# Patient Record
Sex: MALE | Race: WHITE | Employment: OTHER | URBAN - METROPOLITAN AREA
[De-identification: names, ages, dates, MRNs, and addresses within clinical notes are randomized per-mention and may not be internally consistent; named-entity substitution may affect disease eponyms.]

---

## 2017-01-03 ENCOUNTER — TELEPHONE (OUTPATIENT)
Dept: PALLIATIVE MEDICINE | Facility: CLINIC | Age: 72
End: 2017-01-03

## 2017-01-03 NOTE — TELEPHONE ENCOUNTER
Reminded patient that they need to be off steroids, antibiotics and blood thinners for 7 days prior to the procedure. Reminded patient that they also need a .    Huma Ng [facility] [place]

## 2017-01-04 ENCOUNTER — RADIOLOGY INJECTION OFFICE VISIT (OUTPATIENT)
Dept: PALLIATIVE MEDICINE | Facility: CLINIC | Age: 72
End: 2017-01-04
Payer: MEDICARE

## 2017-01-04 ENCOUNTER — RADIANT APPOINTMENT (OUTPATIENT)
Dept: GENERAL RADIOLOGY | Facility: CLINIC | Age: 72
End: 2017-01-04
Attending: ANESTHESIOLOGY
Payer: MEDICARE

## 2017-01-04 VITALS — OXYGEN SATURATION: 100 % | DIASTOLIC BLOOD PRESSURE: 86 MMHG | SYSTOLIC BLOOD PRESSURE: 146 MMHG | HEART RATE: 71 BPM

## 2017-01-04 DIAGNOSIS — M54.16 LUMBAR RADICULOPATHY: Primary | ICD-10-CM

## 2017-01-04 DIAGNOSIS — M54.16 LUMBAR RADICULAR PAIN: ICD-10-CM

## 2017-01-04 PROCEDURE — 64483 NJX AA&/STRD TFRM EPI L/S 1: CPT | Mod: RT | Performed by: ANESTHESIOLOGY

## 2017-01-04 ASSESSMENT — PAIN SCALES - GENERAL: PAINLEVEL: NO PAIN (0)

## 2017-01-04 NOTE — PATIENT INSTRUCTIONS
[facility]   Procedure Discharge Instructions    Today you saw:   Dr. Caitlin Roca    You had an:  Epidural steroid injection     Medications used:  Lidocaine  Dexamethasone  Omnipaque             Be cautious when walking. Numbness and/or weakness in the lower extremities may occur up to 6-8 hours after the procedure due to effect of the local anesthetic    Do not drive for 6 hours. The effect of the local anesthetic could slow your reflexes.     You may resume your regular activities after 24 hours    Avoid strenuous activity for the first 24 hours    You may shower, however avoid swimming, tub baths or hot tubs for 24 hours following your procedure    You may have a mild to moderate increase in pain for several days following the injection.    It may take up to 14 days for the steroid medication to start working although you may feel the effect as early as a few days after the procedure.       You may use ice packs for 10-15 minutes, 3 to 4 times a day at the injection site for comfort    You may use anti-inflammatory medications (such as Ibuprofen or Aleve or Advil) or Tylenol for pain control if necessary    If you have diabetes, check your blood sugar more frequently than usual as your blood sugar may be higher than normal for 10-14 days following a steroid injection. Contact your doctor who manages your diabetes if your blood sugar is higher than usual    If you experience any of the following, call the pain center nursing line during work hours at [phone] or the after hours provider line at [phone]:  -Fever over 100 degree F  -Swelling, bleeding, redness, drainage, warmth at the injection site  -Progressive weakness or numbness in your legs   -Loss of bowel or bladder function  -Unusual headache that is not relieved by Tylenol  -Unusual new onset of pain that is not improving      Phone #s:  Appointment line: [phone];  Nurse line: [phone]

## 2017-01-04 NOTE — PROGRESS NOTES
[facility] - Procedure Note    Date of Service: 1/4/2017    Procedure performed: Right L4-5 transforaminal epidural steroid injection with fluoroscopic guidance  Diagnosis: Lumbar spondylosis; Lumbar radiculitis/radiculopathy  : Caitlin Roca MD   Anesthesia: none    Indications: Paco Eckert is a 71 year old male who is seen at the request of Dolores Sweet APRN CNP for lumbar transforaminal epidural steroid injection. The patient describes right sided low back pain that radiates to his anterior thigh and groin. The patient has been exhibiting symptoms consistent with lumbar intraspinal inflammation and radiculopathy. Symptoms have been persistent, disabling, and intermittently severe. The patient reports minimal improvement with conservative treatment, including PT and medications.    Lumbar MRI: Lumbar MRI [facility] 11-    1.  Multilevel degenerative changes and congenitally small thecal sac.  2.  L2-3 Mild-moderate central stenosis  3.  L3-4 Moderate- severe central stenosis    4.  L4-5 Moderate-severe central stenosis  5.  L5-S1 small left posterolateral disc protrusion    Allergies:    No Known Allergies     Vitals:  /86 mmHg  Pulse 71  SpO2 100%    Review of Systems: The patient denies recent fever, chills, illness, use of antibiotics or anticoagulants. All other 10-point review of systems negative.     Procedure: The procedure and risks were explained, and informed written consent was obtained from the patient. Risks include but are not limited to: infection, bleeding, increased pain, and damage to soft tissue, nerve, muscle, and vasculature structures. After getting informed consent, patient was brought into the procedure suite and was placed in a prone position on the procedure table. A Pause for the Cause was performed. Patient was prepped and draped in sterile fashion.     After identifying the right L4-5 neuroforamen, the C-arm was rotated to  a right lateral oblique angle.  A total of 4.5 mL of Lidocaine 1% was used to anesthetize the skin and the needle track at a skin entry site coaxial with the fluoroscopy beam, and overriding the superior aspect of the neuroforamen.  A 22 gauge 5 inch spinal needle was advanced under intermittent fluoroscopy until it entered the foramen superiorly.    The position was then inspected from anteroposterior and lateral views, and the needle adjusted appropriately.  After negative aspiration, a total of 1 mL of Omnipaque-300 was injected using static and continuous fluoroscopy confirming appropriate position, with spread along the nerve root sheath and into the epidural space, with no intravascular or intrathecal uptake. 9 mL of Omnipaque-300 was wasted.    2 mL of 1% lidocaine with 20 mg of dexamethasone was injected.  The needle was removed. Hemostasis was achieved, the area was cleaned, and bandaids were placed when appropriate. Images were saved to PACS.    The patient tolerated the procedure well, and was taken to the recovery room, and there was no evidence of procedural complications. No new sensory or motor deficits were noted following the procedure. The patient was stable and able to ambulate on discharge home. Post-procedure instructions were provided.     Pre-procedure pain score: 2/10 in the back, 0/10 in the leg  Post-procedure pain score: 0/10 in the back, 0/10 in the leg    Assessment/Plan: Paco Eckert is a 71 year old male s/p Right L4-5 transforaminal epidural steroid injection today for lumbar spondylosis, radiculitis/radiculopathy.     1. Following today's procedure, the patient was advised to contact the [facility] for any of the following:   Fever, chills, or night sweats   New onset of pain, numbness, or weakness   Any questions/concerns regarding the procedure  If unable to contact the Pain Center, the patient was instructed to go to a local Emergency Room for any  complications.   2. The patient will receive a follow-up call in 1 week.  3. The patient should follow-up with Dolores Sweet CNP (Dr. Mcmahon office) in 2 weeks for post-procedure evaluation.    [facility]  1/4/2017

## 2017-01-04 NOTE — PROGRESS NOTES
Injection intake:    If this procedure is requiring IV sedation has patient been NPO for 6  Hours? NA    Is patient on coumadin, plavix or other prescribed blood thinner?   No    If patient is on coumadin was it held for 5 days?   NA    If patient is on plavix was it held for 7 days?    NA     Does patient take aspirin?  No    If this is for a cervical procedure and patient is on aspirin has it been held for 6 days?   NA    Any allergies to contrast dye, iodine, steroid and/or numbing medications?  NO    Is patient currently taking antibiotics or have an active infection?  NO    Does patient have a ? Yes       Is patient pregnant or breastfeeding?  Not Applicable    Are the vital signs normal?  No: 144/104      Huma Ng [facility] [place]

## 2017-01-04 NOTE — MR AVS SNAPSHOT
After Visit Summary   1/4/2017    Paco Eckert    MRN: [id number]           Patient Information     Date Of Birth          1945        Visit Information        Provider Department      1/4/2017 10:00 AM Caitlin Roca MD [facility]        Today's Diagnoses     Lumbar radiculopathy    -  1       Care Instructions    [facility]   Procedure Discharge Instructions    Today you saw:   Dr. Caitlin Roca    You had an:  Epidural steroid injection     Medications used:  Lidocaine  Dexamethasone  Omnipaque             Be cautious when walking. Numbness and/or weakness in the lower extremities may occur up to 6-8 hours after the procedure due to effect of the local anesthetic    Do not drive for 6 hours. The effect of the local anesthetic could slow your reflexes.     You may resume your regular activities after 24 hours    Avoid strenuous activity for the first 24 hours    You may shower, however avoid swimming, tub baths or hot tubs for 24 hours following your procedure    You may have a mild to moderate increase in pain for several days following the injection.    It may take up to 14 days for the steroid medication to start working although you may feel the effect as early as a few days after the procedure.       You may use ice packs for 10-15 minutes, 3 to 4 times a day at the injection site for comfort    You may use anti-inflammatory medications (such as Ibuprofen or Aleve or Advil) or Tylenol for pain control if necessary    If you have diabetes, check your blood sugar more frequently than usual as your blood sugar may be higher than normal for 10-14 days following a steroid injection. Contact your doctor who manages your diabetes if your blood sugar is higher than usual    If you experience any of the following, call the pain center nursing line during work hours at [phone] or the after hours provider line at [phone]:  -Fever over 100 degree  F  -Swelling, bleeding, redness, drainage, warmth at the injection site  -Progressive weakness or numbness in your legs   -Loss of bowel or bladder function  -Unusual headache that is not relieved by Tylenol  -Unusual new onset of pain that is not improving      Phone #s:  Appointment line: [phone];  Nurse line: [phone]            Follow-ups after your visit        Who to contact     If you have questions or need follow up information about today's clinic visit or your schedule please contact [facility] directly at [phone].  Normal or non-critical lab and imaging results will be communicated to you by DotAlignhart, letter or phone within 4 business days after the clinic has received the results. If you do not hear from us within 7 days, please contact the clinic through Youxiduot or phone. If you have a critical or abnormal lab result, we will notify you by phone as soon as possible.  Submit refill requests through Wanova or call your pharmacy and they will forward the refill request to us. Please allow 3 business days for your refill to be completed.          Additional Information About Your Visit        MyChart Information     Wanova gives you secure access to your electronic health record. If you see a primary care provider, you can also send messages to your care team and make appointments. If you have questions, please call your primary care clinic.  If you do not have a primary care provider, please call [phone] and they will assist you.        Care EveryWhere ID     This is your Care EveryWhere ID. This could be used by other organizations to access your [facility] medical records  [id number]        Your Vitals Were     Pulse Pulse Oximetry                76 100%           Blood Pressure from Last 3 Encounters:   01/04/17 144/104   12/21/16 177/113   12/07/16 147/89    Weight from Last 3 Encounters:   12/07/16 90.266 kg (199 lb)   11/08/16 88.168 kg (194 lb 6 oz)   03/25/16  86.002 kg (189 lb 9.6 oz)              Today, you had the following     No orders found for display       Primary Care Provider Office Phone # Fax #    Emeka Hale [phone]       [facility] [address] [place] [address]        Thank you!     Thank you for choosing [facility]  for your care. Our goal is always to provide you with excellent care. Hearing back from our patients is one way we can continue to improve our services. Please take a few minutes to complete the written survey that you may receive in the mail after your visit with us. Thank you!             Your Updated Medication List - Protect others around you: Learn how to safely use, store and throw away your medicines at www.disposemymeds.org.          This list is accurate as of: 1/4/17 10:25 AM.  Always use your most recent med list.                   Brand Name Dispense Instructions for use    CLARITIN 10 MG tablet   Generic drug:  loratadine     30 tablet    Take 10 mg by mouth At Bedtime       clobetasol propionate 0.05 % Foam          ibuprofen 600 MG tablet    IBU    60 tablet    Take 1 tablet by mouth every 6 hours as needed for pain.       lisinopril 10 MG tablet    PRINIVIL/ZESTRIL    90 tablet    Take 1 tablet (10 mg) by mouth daily       montelukast 10 MG tablet    SINGULAIR    30 tablet    Take 1 tablet (10 mg) by mouth At Bedtime       simvastatin 40 MG tablet    ZOCOR    90 tablet    Take 1 tablet (40 mg) by mouth At Bedtime

## 2017-01-11 ENCOUNTER — TELEPHONE (OUTPATIENT)
Dept: PALLIATIVE MEDICINE | Facility: CLINIC | Age: 72
End: 2017-01-11

## 2017-01-11 NOTE — TELEPHONE ENCOUNTER
Patient had a lumbar epidural injection on 01/04/17.  Called patient for an update.      Pt reported the following details:  Patient states that he is feeling good pain relief from the injection.   Told patient that the information will be forwarded to her provider.  Also explained that, if a steroid medication was used, it could take up to 14 days to feel the full effect and if pt has any further questions or concerns pt should call the nurse line at [phone].    Emilie ALSTON)

## 2017-03-13 ENCOUNTER — TRANSFERRED RECORDS (OUTPATIENT)
Dept: HEALTH INFORMATION MANAGEMENT | Facility: CLINIC | Age: 72
End: 2017-03-13

## 2017-09-18 ENCOUNTER — TRANSFERRED RECORDS (OUTPATIENT)
Dept: HEALTH INFORMATION MANAGEMENT | Facility: CLINIC | Age: 72
End: 2017-09-18

## 2017-10-03 ENCOUNTER — OFFICE VISIT (OUTPATIENT)
Dept: PEDIATRICS | Facility: CLINIC | Age: 72
End: 2017-10-03
Payer: MEDICARE

## 2017-10-03 VITALS
HEIGHT: 66 IN | TEMPERATURE: 97.9 F | OXYGEN SATURATION: 98 % | BODY MASS INDEX: 30.86 KG/M2 | WEIGHT: 192 LBS | HEART RATE: 79 BPM | SYSTOLIC BLOOD PRESSURE: 108 MMHG | DIASTOLIC BLOOD PRESSURE: 70 MMHG

## 2017-10-03 DIAGNOSIS — S46.819A STRAIN OF TRAPEZIUS MUSCLE, UNSPECIFIED LATERALITY, INITIAL ENCOUNTER: Primary | ICD-10-CM

## 2017-10-03 DIAGNOSIS — I10 ESSENTIAL HYPERTENSION WITH GOAL BLOOD PRESSURE LESS THAN 140/90: ICD-10-CM

## 2017-10-03 DIAGNOSIS — B37.2 CANDIDAL DERMATITIS: ICD-10-CM

## 2017-10-03 DIAGNOSIS — E78.5 HYPERLIPIDEMIA LDL GOAL <130: ICD-10-CM

## 2017-10-03 LAB
CHOLEST SERPL-MCNC: 205 MG/DL
HDLC SERPL-MCNC: 29 MG/DL
LDLC SERPL CALC-MCNC: 136 MG/DL
NONHDLC SERPL-MCNC: 176 MG/DL
TRIGL SERPL-MCNC: 198 MG/DL

## 2017-10-03 PROCEDURE — 36415 COLL VENOUS BLD VENIPUNCTURE: CPT | Performed by: INTERNAL MEDICINE

## 2017-10-03 PROCEDURE — 99214 OFFICE O/P EST MOD 30 MIN: CPT | Performed by: INTERNAL MEDICINE

## 2017-10-03 PROCEDURE — 80061 LIPID PANEL: CPT | Performed by: INTERNAL MEDICINE

## 2017-10-03 RX ORDER — HYDROCODONE BITARTRATE AND ACETAMINOPHEN 5; 325 MG/1; MG/1
1-2 TABLET ORAL EVERY 4 HOURS PRN
Qty: 20 TABLET | Refills: 0 | Status: SHIPPED | OUTPATIENT
Start: 2017-10-03 | End: 2018-03-22

## 2017-10-03 RX ORDER — LISINOPRIL 10 MG/1
10 TABLET ORAL DAILY
Qty: 90 TABLET | Refills: 3 | Status: SHIPPED | OUTPATIENT
Start: 2017-10-03 | End: 2017-10-27

## 2017-10-03 RX ORDER — SIMVASTATIN 40 MG
40 TABLET ORAL AT BEDTIME
Qty: 90 TABLET | Refills: 3 | Status: SHIPPED | OUTPATIENT
Start: 2017-10-03 | End: 2018-10-26

## 2017-10-03 RX ORDER — CLOTRIMAZOLE 1 %
CREAM (GRAM) TOPICAL 2 TIMES DAILY
Qty: 15 G | Refills: 1 | Status: SHIPPED | OUTPATIENT
Start: 2017-10-03 | End: 2018-10-26

## 2017-10-03 RX ORDER — CYCLOBENZAPRINE HCL 10 MG
10 TABLET ORAL 3 TIMES DAILY PRN
Qty: 30 TABLET | Refills: 0 | Status: SHIPPED | OUTPATIENT
Start: 2017-10-03 | End: 2018-03-22

## 2017-10-03 ASSESSMENT — ANXIETY QUESTIONNAIRES
1. FEELING NERVOUS, ANXIOUS, OR ON EDGE: NOT AT ALL
3. WORRYING TOO MUCH ABOUT DIFFERENT THINGS: NOT AT ALL
2. NOT BEING ABLE TO STOP OR CONTROL WORRYING: NOT AT ALL
GAD7 TOTAL SCORE: 0
5. BEING SO RESTLESS THAT IT IS HARD TO SIT STILL: NOT AT ALL
IF YOU CHECKED OFF ANY PROBLEMS ON THIS QUESTIONNAIRE, HOW DIFFICULT HAVE THESE PROBLEMS MADE IT FOR YOU TO DO YOUR WORK, TAKE CARE OF THINGS AT HOME, OR GET ALONG WITH OTHER PEOPLE: NOT DIFFICULT AT ALL
6. BECOMING EASILY ANNOYED OR IRRITABLE: NOT AT ALL
7. FEELING AFRAID AS IF SOMETHING AWFUL MIGHT HAPPEN: NOT AT ALL

## 2017-10-03 ASSESSMENT — PATIENT HEALTH QUESTIONNAIRE - PHQ9
SUM OF ALL RESPONSES TO PHQ QUESTIONS 1-9: 0
5. POOR APPETITE OR OVEREATING: NOT AT ALL
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
## 2017-10-03 NOTE — PATIENT INSTRUCTIONS
"Lab work downstairs today.  Directions:  As you walk through the first floor, you'll see (on the right) first the pharmacy, then some bathrooms, then the \"Lab and Imaging\" area. Give them your name at the window there and wait for them to call you.     May begin flexeril (cyclobenzaprine) as needed for muscle spasm of neck.    May begin hydrocodone/acetaminophen up to 1-2 per 6 hours.    Call physical therapy or stop by desk today to set up physical therapy>                      Neck Strain             What is neck strain?   A strain is a tear of a muscle or tendon. Your neck is surrounded by small muscles that are close to the vertebrae, and larger muscles that make up the visible muscles of the neck.   How does it occur?   Neck strains most often happen when the head and neck are forcibly moved, such as in a whiplash injury or from contact in sports. Sometimes strains happen from an awkward position during sleep or poor posture while working at a computer.   What are the symptoms?   Symptoms include pain in your neck. When the neck muscles go into spasm you feel hard, tight muscles in your neck that are very tender to the touch. You have pain when you move your head to the side or when you try to move your head up or down. The spasming muscles can cause headaches.   The pain may start right after an injury or may take a few hours or days to develop. Other symptoms may include neck stiffness, dizziness, or unusual sensations, such as burning or a pins-and-needles feeling.   How is it diagnosed?   Your healthcare provider will examine your neck. You may have X-rays to make sure the vertebrae are not injured.   How is it treated?   Right after the injury, put an ice pack, gel pack, or package of frozen vegetables, wrapped in a cloth on the area every 3 to 4 hours, for up to 20 minutes at a time.   If you still have neck pain several days after the injury and after using ice, your healthcare provider may recommend " using moist heat on your neck. You can buy a moist-heat pad or make your own by soaking towels in hot water. Put moist heat on your neck for up to 20 minutes at a time every 3 or 4 hours until the pain goes away. You may find that it helps to alternate putting heat and ice on your neck.   Your healthcare provider may prescribe an anti-inflammatory medicine and a neck collar to support your neck and prevent further injury. Nonsteroidal anti-inflammatory medicines (NSAIDs) may cause stomach bleeding and other problems. These risks increase with age. Read the label and take as directed. Unless recommended by your healthcare provider, do not take for more than 10 days.   Follow your provider's instructions for doing exercises to help you recover.   How long will the effects last?   How long it takes to recover depends on your age, health, and if you have had a previous neck injury. Recovery time also depends on the severity of the injury. A mild injury may recover within a few weeks, whereas a severe injury may take 6 weeks or longer to recover. Ask your healthcare provider when you can return to your normal activities.   How can I prevent neck strain?   Neck strain is best prevented by having strong and supple neck muscles. If you have a job that requires you to be in one position all day (for example, work at a computer all day), it is very important to take breaks and stretch your neck muscles. Your provider will give you exercises to do while taking breaks from work.     Published by [employer].  This content is reviewed periodically and is subject to change as new health information becomes available. The information is intended to inform and educate and is not a replacement for medical evaluation, advice, diagnosis or treatment by a healthcare professional.   Written by Marcos Castillo MD, for [employer].   ? 2010 [employer] and/or its affiliates. All Rights Reserved.   Copyright   [employer]  2011  Adult Health Advisor                    Neck Strain Rehabilitation Exercises                Do these exercises only if you do not have pain or numbness running down your arm or into your hand. The first 6 exercises are meant to help your neck remain flexible. Do not do any exercises that make your neck pain worse.   Active neck rotation: Sit in a chair, keeping your neck, shoulders, and trunk straight. First, turn your head slowly to the right. Move it gently to the point of pain. Move it back to the forward position. Relax. Then move it to the left. Repeat 10 times.   Active neck side bend: Sit in a chair, keeping your neck, shoulders, and trunk straight. Tilt your head so that your right ear moves toward your right shoulder. Move it to the point of pain. Then tilt your head so your left ear moves toward your left shoulder. Make sure you do not rotate your head while tilting or raise your shoulder toward your head. Repeat this exercise 10 times in each direction.   Neck flexion: Sit in a chair, keeping your neck, shoulders, and trunk straight. Bend your head forward, reaching your chin toward your chest. Hold for 5 seconds. Repeat 10 times.   Neck extension: Sit in a chair, keeping your neck, shoulders, and trunk straight. Bring your head back so that your chin is pointing toward the ceiling. Repeat 10 times.   Chin tuck: Place your fingertips on your chin and gently push your head straight back as if you are trying to make a double chin. Keep looking forward as your head moves back. Hold 5 seconds and repeat 5 times.   Scalene stretch: Sit or stand and clasp both hands behind your back. Lower your left shoulder and tilt your head toward the right until you feel a stretch. Hold this position for 15 to 30 seconds and then come back to the starting position. Then lower your right shoulder and tilt your head toward the left. Hold for 15 to 30 seconds. Repeat 3 times on each side.   Isometric neck flexion: Sit  tall, eyes straight ahead, and chin level. Place your palm against your forehead and gently push your forehead into your palm. Hold for 5 seconds and release. Do 3 sets of 5.   Isometric neck extension: Sit tall, eyes straight ahead, and chin level. Clasp your hands together and place them behind your head. Press the back of your head into your palms. Hold 5 seconds and release. Do 3 sets of 5.   Isometric neck side bend: Sit tall, eyes straight ahead, and chin level. Place the palm of your hand at the side of your temple and press your temple into the palm of your hand. Hold 5 seconds and release. Do 3 sets of 5 on each side.   Head lift: Neck curl: Lie on your back with your knees bent and your feet flat on the floor. Tuck your chin and lift your head toward your chest, keeping your shoulders on the floor. Hold for 5 seconds. Repeat 10 times.   Head lift: Neck side bend: Lie on your right side with your right arm lying straight out. Rest your head on your arm, then lift your head slowly toward your left shoulder. Hold for 5 seconds. Repeat 10 times. Switch to your left side and repeat the exercise, lifting your head toward your right shoulder.   Neck extension on hands and knees: Get on your hands and knees and look down at the floor. Keep your back straight and let your head slowly drop toward your chest. Then tuck your chin slightly and lift your head up until your neck is level with your back. Hold this position for 5 seconds. Repeat 10 times.   Scapular squeeze: While sitting or standing with your arms by your sides, squeeze your shoulder blades together and hold for 5 seconds. Do 3 sets of 10.   Published by [employer].  This content is reviewed periodically and is subject to change as new health information becomes available. The information is intended to inform and educate and is not a replacement for medical evaluation, advice, diagnosis or treatment by a healthcare professional.   Written by Arlette  Simeon MS, PT, and Mrae Rivera, PT, [facility], hospitals, for [employer].   ? 2010 [facility] and/or its affiliates. All Rights Reserved.   Copyright   [employer] 2011  Adult Health Advisor

## 2017-10-03 NOTE — NURSING NOTE
"Chief Complaint   Patient presents with     Musculoskeletal Problem     Neck pain       Initial /70 (BP Location: Right arm, Cuff Size: Adult Regular)  Pulse 79  Temp 97.9  F (36.6  C) (Oral)  Ht 5' 6\" (1.676 m)  Wt 192 lb (87.1 kg)  SpO2 98%  BMI 30.99 kg/m2 Estimated body mass index is 30.99 kg/(m^2) as calculated from the following:    Height as of this encounter: 5' 6\" (1.676 m).    Weight as of this encounter: 192 lb (87.1 kg).  Medication Reconciliation: complete     Mima Laguna MA   October 3, 2017,  10:00 AM    "

## 2017-10-03 NOTE — MR AVS SNAPSHOT
"              After Visit Summary   10/3/2017    Paco Eckert    MRN: [id number]           Patient Information     Date Of Birth          1945        Visit Information        Provider Department      10/3/2017 10:00 AM Emeka Hale MD [facility]        Today's Diagnoses     Strain of trapezius muscle, unspecified laterality, initial encounter    -  1    Hyperlipidemia LDL goal <130        Essential hypertension with goal blood pressure less than 140/90          Care Instructions    Lab work downstairs today.  Directions:  As you walk through the first floor, you'll see (on the right) first the pharmacy, then some bathrooms, then the \"Lab and Imaging\" area. Give them your name at the window there and wait for them to call you.     May begin flexeril (cyclobenzaprine) as needed for muscle spasm of neck.    May begin hydrocodone/acetaminophen up to 1-2 per 6 hours.    Call physical therapy or stop by desk today to set up physical therapy>                      Neck Strain             What is neck strain?   A strain is a tear of a muscle or tendon. Your neck is surrounded by small muscles that are close to the vertebrae, and larger muscles that make up the visible muscles of the neck.   How does it occur?   Neck strains most often happen when the head and neck are forcibly moved, such as in a whiplash injury or from contact in sports. Sometimes strains happen from an awkward position during sleep or poor posture while working at a computer.   What are the symptoms?   Symptoms include pain in your neck. When the neck muscles go into spasm you feel hard, tight muscles in your neck that are very tender to the touch. You have pain when you move your head to the side or when you try to move your head up or down. The spasming muscles can cause headaches.   The pain may start right after an injury or may take a few hours or days to develop. Other symptoms may include neck stiffness, dizziness, or unusual " sensations, such as burning or a pins-and-needles feeling.   How is it diagnosed?   Your healthcare provider will examine your neck. You may have X-rays to make sure the vertebrae are not injured.   How is it treated?   Right after the injury, put an ice pack, gel pack, or package of frozen vegetables, wrapped in a cloth on the area every 3 to 4 hours, for up to 20 minutes at a time.   If you still have neck pain several days after the injury and after using ice, your healthcare provider may recommend using moist heat on your neck. You can buy a moist-heat pad or make your own by soaking towels in hot water. Put moist heat on your neck for up to 20 minutes at a time every 3 or 4 hours until the pain goes away. You may find that it helps to alternate putting heat and ice on your neck.   Your healthcare provider may prescribe an anti-inflammatory medicine and a neck collar to support your neck and prevent further injury. Nonsteroidal anti-inflammatory medicines (NSAIDs) may cause stomach bleeding and other problems. These risks increase with age. Read the label and take as directed. Unless recommended by your healthcare provider, do not take for more than 10 days.   Follow your provider's instructions for doing exercises to help you recover.   How long will the effects last?   How long it takes to recover depends on your age, health, and if you have had a previous neck injury. Recovery time also depends on the severity of the injury. A mild injury may recover within a few weeks, whereas a severe injury may take 6 weeks or longer to recover. Ask your healthcare provider when you can return to your normal activities.   How can I prevent neck strain?   Neck strain is best prevented by having strong and supple neck muscles. If you have a job that requires you to be in one position all day (for example, work at a computer all day), it is very important to take breaks and stretch your neck muscles. Your provider will give  you exercises to do while taking breaks from work.     Published by [employer].  This content is reviewed periodically and is subject to change as new health information becomes available. The information is intended to inform and educate and is not a replacement for medical evaluation, advice, diagnosis or treatment by a healthcare professional.   Written by Marcos Castillo MD, for [employer].   ? 2010 [facility] and/or its affiliates. All Rights Reserved.   Copyright   Clinical Reference Systems 2011  Adult Health Advisor                    Neck Strain Rehabilitation Exercises                Do these exercises only if you do not have pain or numbness running down your arm or into your hand. The first 6 exercises are meant to help your neck remain flexible. Do not do any exercises that make your neck pain worse.   Active neck rotation: Sit in a chair, keeping your neck, shoulders, and trunk straight. First, turn your head slowly to the right. Move it gently to the point of pain. Move it back to the forward position. Relax. Then move it to the left. Repeat 10 times.   Active neck side bend: Sit in a chair, keeping your neck, shoulders, and trunk straight. Tilt your head so that your right ear moves toward your right shoulder. Move it to the point of pain. Then tilt your head so your left ear moves toward your left shoulder. Make sure you do not rotate your head while tilting or raise your shoulder toward your head. Repeat this exercise 10 times in each direction.   Neck flexion: Sit in a chair, keeping your neck, shoulders, and trunk straight. Bend your head forward, reaching your chin toward your chest. Hold for 5 seconds. Repeat 10 times.   Neck extension: Sit in a chair, keeping your neck, shoulders, and trunk straight. Bring your head back so that your chin is pointing toward the ceiling. Repeat 10 times.   Chin tuck: Place your fingertips on your chin and gently push your head straight back as if you are  trying to make a double chin. Keep looking forward as your head moves back. Hold 5 seconds and repeat 5 times.   Scalene stretch: Sit or stand and clasp both hands behind your back. Lower your left shoulder and tilt your head toward the right until you feel a stretch. Hold this position for 15 to 30 seconds and then come back to the starting position. Then lower your right shoulder and tilt your head toward the left. Hold for 15 to 30 seconds. Repeat 3 times on each side.   Isometric neck flexion: Sit tall, eyes straight ahead, and chin level. Place your palm against your forehead and gently push your forehead into your palm. Hold for 5 seconds and release. Do 3 sets of 5.   Isometric neck extension: Sit tall, eyes straight ahead, and chin level. Clasp your hands together and place them behind your head. Press the back of your head into your palms. Hold 5 seconds and release. Do 3 sets of 5.   Isometric neck side bend: Sit tall, eyes straight ahead, and chin level. Place the palm of your hand at the side of your temple and press your temple into the palm of your hand. Hold 5 seconds and release. Do 3 sets of 5 on each side.   Head lift: Neck curl: Lie on your back with your knees bent and your feet flat on the floor. Tuck your chin and lift your head toward your chest, keeping your shoulders on the floor. Hold for 5 seconds. Repeat 10 times.   Head lift: Neck side bend: Lie on your right side with your right arm lying straight out. Rest your head on your arm, then lift your head slowly toward your left shoulder. Hold for 5 seconds. Repeat 10 times. Switch to your left side and repeat the exercise, lifting your head toward your right shoulder.   Neck extension on hands and knees: Get on your hands and knees and look down at the floor. Keep your back straight and let your head slowly drop toward your chest. Then tuck your chin slightly and lift your head up until your neck is level with your back. Hold this position  for 5 seconds. Repeat 10 times.   Scapular squeeze: While sitting or standing with your arms by your sides, squeeze your shoulder blades together and hold for 5 seconds. Do 3 sets of 10.   Published by [employer].  This content is reviewed periodically and is subject to change as new health information becomes available. The information is intended to inform and educate and is not a replacement for medical evaluation, advice, diagnosis or treatment by a healthcare professional.   Written by Arlette Hendrix, MS, PT, and Mare Rivera PT, [facility] [place], for [employer].   ? 2010 [facility] and/or its affiliates. All Rights Reserved.   Copyright   Clinical Reference Systems 2011  Adult Health Advisor                               Follow-ups after your visit        Additional Services     [facility] PT, HAND, AND CHIROPRACTIC REFERRAL       **This order will print in the [facility] Scheduling Office**    Physical Therapy, Hand Therapy and Chiropractic Care are available through:    *[facility]  *[facility]  *[facility]    Call one number to schedule at any of the above locations: [phone].    Your provider has referred you to: [facility] - PT and/or Chiropractic Care determined by clinical presentation at [facility] or [facility] Initial Visit    Indication/Reason for Referral: Neck Pain  Onset of Illness: 3 weeks.   Therapy Orders: Evaluate and Treat  Special Programs: None  Special Request: None    Filemon Colbert      Additional Comments for the Therapist or Chiropractor:     Please be aware that coverage of these services is subject to the terms and limitations of your health insurance plan.  Call member services at your health plan with any benefit or coverage questions.      Please bring the following to your appointment:    *Your personal calendar for scheduling future appointments  *Comfortable clothing                  Who to contact     If you have questions or need  "follow up information about today's clinic visit or your schedule please contact [facility] [place] directly at [phone].  Normal or non-critical lab and imaging results will be communicated to you by St[employer], letter or phone within 4 business days after the clinic has received the results. If you do not hear from us within 7 days, please contact the clinic through Storie[employer] or phone. If you have a critical or abnormal lab result, we will notify you by phone as soon as possible.  Submit refill requests through [employer] or call your pharmacy and they will forward the refill request to us. Please allow 3 business days for your refill to be completed.          Additional Information About Your Visit        [employer] Information     [employer] gives you secure access to your electronic health record. If you see a primary care provider, you can also send messages to your care team and make appointments. If you have questions, please call your primary care clinic.  If you do not have a primary care provider, please call [phone] and they will assist you.        Care EveryWhere ID     This is your Care EveryWhere ID. This could be used by other organizations to access your [place] medical records  [id number]        Your Vitals Were     Pulse Temperature Height Pulse Oximetry BMI (Body Mass Index)       79 97.9  F (36.6  C) (Oral) 5' 6\" (1.676 m) 98% 30.99 kg/m2        Blood Pressure from Last 3 Encounters:   10/03/17 108/70   01/04/17 146/86   12/21/16 (!) 177/113    Weight from Last 3 Encounters:   10/03/17 192 lb (87.1 kg)   12/07/16 199 lb (90.3 kg)   11/08/16 194 lb 6 oz (88.2 kg)              We Performed the Following     [facility] PT, HAND, AND CHIROPRACTIC REFERRAL     Lipid panel reflex to direct LDL          Today's Medication Changes          These changes are accurate as of: 10/3/17 10:22 AM.  If you have any questions, ask your nurse or doctor.               Start taking these medicines.        " Dose/Directions    cyclobenzaprine 10 MG tablet   Commonly known as:  FLEXERIL   Used for:  Strain of trapezius muscle, unspecified laterality, initial encounter   Started by:  Emeka Hale MD        Dose:  10 mg   Take 1 tablet (10 mg) by mouth 3 times daily as needed for muscle spasms   Quantity:  30 tablet   Refills:  0       HYDROcodone-acetaminophen 5-325 MG per tablet   Commonly known as:  NORCO   Used for:  Strain of trapezius muscle, unspecified laterality, initial encounter   Started by:  Emeka Hale MD        Dose:  1-2 tablet   Take 1-2 tablets by mouth every 4 hours as needed for moderate to severe pain maximum 4 tablet(s) per day   Quantity:  20 tablet   Refills:  0            Where to get your medicines      These medications were sent to [id number] - [place] - [address] AT [facility] [address]  [address], [place] [address]     Phone:  [phone]     cyclobenzaprine 10 MG tablet    lisinopril 10 MG tablet         Some of these will need a paper prescription and others can be bought over the counter.  Ask your nurse if you have questions.     Bring a paper prescription for each of these medications     HYDROcodone-acetaminophen 5-325 MG per tablet                Primary Care Provider Office Phone # Fax #    Emeka Hale [phone] [address] [place] [address]        Equal Access to Services     [facility]: Hadii aad ku hadasho Soomaali, waaxda luqadaha, qaybta kaalmada adeegyada, lissett gonzalez. So [facility] [phone].    ATENCIÓN: Si habla español, tiene a squires disposición servicios gratuitos de asistencia lingüística. Wili al [phone].    We comply with applicable federal civil rights laws and [place] laws. We do not discriminate on the basis of race, color, national origin, age, disability, sex, sexual orientation, or gender identity.            Thank you!     Thank you for  choosing [facility] [place]  for your care. Our goal is always to provide you with excellent care. Hearing back from our patients is one way we can continue to improve our services. Please take a few minutes to complete the written survey that you may receive in the mail after your visit with us. Thank you!             Your Updated Medication List - Protect others around you: Learn how to safely use, store and throw away your medicines at www.disposemymeds.org.          This list is accurate as of: 10/3/17 10:22 AM.  Always use your most recent med list.                   Brand Name Dispense Instructions for use Diagnosis    clobetasol propionate 0.05 % Foam           cyclobenzaprine 10 MG tablet    FLEXERIL    30 tablet    Take 1 tablet (10 mg) by mouth 3 times daily as needed for muscle spasms    Strain of trapezius muscle, unspecified laterality, initial encounter       HYDROcodone-acetaminophen 5-325 MG per tablet    NORCO    20 tablet    Take 1-2 tablets by mouth every 4 hours as needed for moderate to severe pain maximum 4 tablet(s) per day    Strain of trapezius muscle, unspecified laterality, initial encounter       ibuprofen 600 MG tablet    IBU    60 tablet    Take 1 tablet by mouth every 6 hours as needed for pain.    Left-sided chest wall pain       lisinopril 10 MG tablet    PRINIVIL/ZESTRIL    90 tablet    Take 1 tablet (10 mg) by mouth daily    Essential hypertension with goal blood pressure less than 140/90       loratadine-pseudoePHEDrine  MG per 24 hr tablet    CLARITIN-D 24-hour     Take 1 tablet by mouth daily        simvastatin 40 MG tablet    ZOCOR    90 tablet    Take 1 tablet (40 mg) by mouth At Bedtime    Hyperlipidemia LDL goal <130

## 2017-10-04 ENCOUNTER — THERAPY VISIT (OUTPATIENT)
Dept: PHYSICAL THERAPY | Facility: CLINIC | Age: 72
End: 2017-10-04

## 2017-10-04 DIAGNOSIS — Z53.9 ERRONEOUS ENCOUNTER--DISREGARD: Primary | ICD-10-CM

## 2017-10-04 PROBLEM — M54.2 CERVICAL PAIN: Status: ACTIVE | Noted: 2017-10-04

## 2017-10-04 ASSESSMENT — ANXIETY QUESTIONNAIRES: GAD7 TOTAL SCORE: 0

## 2017-10-04 NOTE — PROGRESS NOTES
Patient wanted to be seen by chiropractic and not physical therapy.  Patient made decision to hold on todays visit to see chiro first.

## 2017-10-04 NOTE — MR AVS SNAPSHOT
After Visit Summary   10/4/2017    Paco Eckert    MRN: [id number]           Patient Information     Date Of Birth          1945        Visit Information        Provider Department      10/4/2017 10:50 AM Umer Bellamy PT [facility] [place]        Today's Diagnoses     ERRONEOUS ENCOUNTER--DISREGARD    -  1       Follow-ups after your visit        Your next 10 appointments already scheduled     Oct 10, 2017 11:00 AM CDT   (Arrive by 10:45 AM)   [facility] Chiropractor with Cierra Sheets DC   [facility] [place] ([facility]  )    [address]  [address]  [place] [address] [phone]              Who to contact     If you have questions or need follow up information about today's clinic visit or your schedule please contact [facility] [place] directly at [phone].  Normal or non-critical lab and imaging results will be communicated to you by MyChart, letter or phone within 4 business days after the clinic has received the results. If you do not hear from us within 7 days, please contact the clinic through [employer] or phone. If you have a critical or abnormal lab result, we will notify you by phone as soon as possible.  Submit refill requests through [employer] or call your pharmacy and they will forward the refill request to us. Please allow 3 business days for your refill to be completed.          Additional Information About Your Visit        MyChart Information     [employer] gives you secure access to your electronic health record. If you see a primary care provider, you can also send messages to your care team and make appointments. If you have questions, please call your primary care clinic.  If you do not have a primary care provider, please call [phone] and they will assist you.        Care EveryWhere ID     This is your Care EveryWhere ID. This could be used by other organizations to access your [facility]  medical records  [id number]         Blood Pressure from Last 3 Encounters:   10/03/17 108/70   01/04/17 146/86   12/21/16 (!) 177/113    Weight from Last 3 Encounters:   10/03/17 87.1 kg (192 lb)   12/07/16 90.3 kg (199 lb)   11/08/16 88.2 kg (194 lb 6 oz)              We Performed the Following     No Charge LOS        Primary Care Provider Office Phone # Fax #    Emeka Hale [phone] [address] [place] [address]        Equal Access to Services     [facility]: Hadii aad ku hadasho Sowaylon, waaxda luqadaha, qaybta kaalmada adelucille, lissett michel . So [facility] [phone].    ATENCIÓN: Si habla español, tiene a squires disposición servicios gratuitos de asistencia lingüística. [facility] [phone].    We comply with applicable federal civil rights laws and [place] laws. We do not discriminate on the basis of race, color, national origin, age, disability, sex, sexual orientation, or gender identity.            Thank you!     Thank you for choosing [facility] [place]  for your care. Our goal is always to provide you with excellent care. Hearing back from our patients is one way we can continue to improve our services. Please take a few minutes to complete the written survey that you may receive in the mail after your visit with us. Thank you!             Your Updated Medication List - Protect others around you: Learn how to safely use, store and throw away your medicines at www.disposemymeds.org.          This list is accurate as of: 10/4/17 10:58 AM.  Always use your most recent med list.                   Brand Name Dispense Instructions for use Diagnosis    clobetasol propionate 0.05 % Foam           clotrimazole 1 % cream    LOTRIMIN    15 g    Apply topically 2 times daily    Candidal dermatitis       cyclobenzaprine 10 MG tablet    FLEXERIL    30 tablet    Take 1 tablet (10 mg) by mouth 3 times daily as needed for muscle  spasms    Strain of trapezius muscle, unspecified laterality, initial encounter       HYDROcodone-acetaminophen 5-325 MG per tablet    NORCO    20 tablet    Take 1-2 tablets by mouth every 4 hours as needed for moderate to severe pain maximum 4 tablet(s) per day    Strain of trapezius muscle, unspecified laterality, initial encounter       ibuprofen 600 MG tablet    IBU    60 tablet    Take 1 tablet by mouth every 6 hours as needed for pain.    Left-sided chest wall pain       lisinopril 10 MG tablet    PRINIVIL/ZESTRIL    90 tablet    Take 1 tablet (10 mg) by mouth daily    Essential hypertension with goal blood pressure less than 140/90       loratadine-pseudoePHEDrine  MG per 24 hr tablet    CLARITIN-D 24-hour     Take 1 tablet by mouth daily        simvastatin 40 MG tablet    ZOCOR    90 tablet    Take 1 tablet (40 mg) by mouth At Bedtime    Hyperlipidemia LDL goal <130

## 2017-10-10 ENCOUNTER — THERAPY VISIT (OUTPATIENT)
Dept: CHIROPRACTIC MEDICINE | Facility: CLINIC | Age: 72
End: 2017-10-10
Payer: MEDICARE

## 2017-10-10 DIAGNOSIS — G89.29 CHRONIC RIGHT-SIDED LOW BACK PAIN WITH RIGHT-SIDED SCIATICA: ICD-10-CM

## 2017-10-10 DIAGNOSIS — M99.03 SOMATIC DYSFUNCTION OF LUMBAR REGION: Primary | ICD-10-CM

## 2017-10-10 DIAGNOSIS — M99.02 THORACIC SEGMENT DYSFUNCTION: ICD-10-CM

## 2017-10-10 DIAGNOSIS — M54.2 CERVICALGIA: ICD-10-CM

## 2017-10-10 DIAGNOSIS — M54.41 CHRONIC RIGHT-SIDED LOW BACK PAIN WITH RIGHT-SIDED SCIATICA: ICD-10-CM

## 2017-10-10 PROCEDURE — 98941 CHIROPRACT MANJ 3-4 REGIONS: CPT | Mod: AT | Performed by: CHIROPRACTOR

## 2017-10-10 PROCEDURE — 99203 OFFICE O/P NEW LOW 30 MIN: CPT | Mod: GA | Performed by: CHIROPRACTOR

## 2017-10-10 NOTE — PROGRESS NOTES
Initial Chiropractic Clinic Visit    PCP: Emeka Hale Babar is a 72 year old male who is seen  in consultation at the request of  Emeka Hale M.D. presenting with chronic lower back, right leg, and bilateral lower neck and upper back pain. Patient reports that the onset was 5+ years ago for unknown reasons. Patient reports over the last six month the lower back and leg pain has been getting worse. Pain is most noticeable when walking. States sitting improves the pain.  When asked, patient denies:, falling, slipping, bending and reaching or sleeping awkwardly. Patient reports he has tried physical therapy without much relief.  Prior to onset, the patient was able to walk 30 minutes without pain. Patient notes that due to symptoms, they can only walk 5 minutes.. Paco Eckert notes   standing rated at a 4/10 painful and prior to this incident it was 0/10.    MRI done one year ago of the lumbar spine showed, moderate to severe central stenosis L3-L4. Mild to moderate foraminal stenosis L4-L5, L5-S1.    Injury: No history of trauma or injury.    Location of Pain: lower lumbar spine into the right leg, upper thoracic, lower to upper cervical spine at the following level(s) C3 , C6 , T3 , T5 , T10, L4 , L5  and Sacrum   Duration of Pain: 5+ years   Rating of Pain at worst: 6/10  Rating of Pain Currently: 3-4/10  Symptoms are better with: Rest and sitting  Symptoms are worse with: standing and walking  Additional Features: Occasional numbness into right lateral leg. Pain to right groin and right lateral leg.     Health History  as reported by the patient:    How does the patient rate their own health:   Good    Current or past medical history:   Cancer-prostate and Depression    Medical allergies  None    Past Traumas/Surgeries  Cancer:  prostate and Other:  rectal    Family History  This patient has no significant family history    Medications:  Anti-depressants, Anti-inflammatory and Muscle  "relaxants    Occupation:  Retired    Primary job tasks:   Prolonged sitting    Barriers as home/work:   none    Additional health Issues:     NA      Review of Systems  Musculoskeletal: as above  Remainder of review of systems is negative including constitutional, CV, pulmonary, GI, Skin and Neurologic except as noted in HPI or medical history.    Past Medical History:   Diagnosis Date     Fatty liver 2/9/2011     Gouty arthropathy      Hypercholesteremia 12/15/2009     Hypertension      Impaired fasting glucose 7/26/2010     Leukopenia 11/7/2008    WBC 3.6 in 11/08.     Malignant neoplasm of prostate (H) 2000    on casodex and Lupron     Osteoporosis, unspecified     assoc with Lupron     Other psoriasis      Past Surgical History:   Procedure Laterality Date     APPLY WOUND VAC N/A 10/21/2014    Procedure: APPLY WOUND VAC;  Surgeon: Mir Hernandez MD;  Location:  OR     COLONOSCOPY N/A 10/10/2014    Procedure: COMBINED COLONOSCOPY, SINGLE BIOPSY/POLYPECTOMY BY BIOPSY;  Surgeon: Mir Hernandez MD;  Location:  GI     COLOSTOMY N/A 10/21/2014    Procedure: COLOSTOMY;  Surgeon: Mir Hernandez MD;  Location:  OR     ORTHOPEDIC SURGERY Bilateral 2000 ?    rotater repair both     ORTHOPEDIC SURGERY Right     ankle      RESECTION ABDOMINAL PERINEAL N/A 10/21/2014    Procedure: RESECTION ABDOMINAL PERINEAL;  Surgeon: Mir Hernandez MD;  Location:  OR     Objective  There were no vitals taken for this visit.      GENERAL APPEARANCE: healthy, alert and no distress   GAIT: NORMAL  SKIN: no suspicious lesions or rashes  NEURO: Normal strength and tone, mentation intact and speech normal  PSYCH:  mentation appears normal and affect normal/bright    Low back exam:    Inspection:  \"     no visible deformity in the low back       normal skin\",    ROM:       limited flexion due to pain       limited extension due to pain    Tender:       paraspinal muscles    Non Tender:       remainder of lumbar " spine    Strength:       ankle dorsiflexion 5/5       ankle plantarflexion 5/5       dorsiflexion of the great toe 5/5    Reflexes:       patellar (L3, L4) symmetric normal       achilles tendons (S1) symmetric normal    Sensation:      grossly intact throughout lower extremities    Special tests:  Kemps - Right negative and Left negative, SLR - Right negative and Left negative, Slump - Right negative and Left negative and Fabere - Right positive, mild back/hip and Left positive, mild back/hip    Segmental spinal dysfunction/restrictions found at::  C6 Left rotation restricted  T3 Right rotation restricted  T10 Right rotation restricted  L3 Right rotation restricted  L5 Right rotation restricted  PSIS Right Flexion restriction.    The following soft tissue hypotonicities were observed:Quad lumb: bilateral, referred pain: no    Trigger points were found in:Traps    Muscle spasm found in:Lumbar erector spine, Quad lumb, T-spine paraspinal and Traps      Radiology:  None today    Assessment:    No diagnosis found.    RX ordered/plan of care  Anticipated outcomes  Possible risks and side effects    After discussing the risk and benefits of care, patient consented to treatment    Prognosis: fair-due to DDD, stenosis      Patient's condition:  Patient had restrictions pre-manipulation    Treatment effectiveness:  Post manipulation there is better intersegmental movement and Patient claims to feel looser post manipulation      Plan:    Procedures:  Evaluation and Management:  78315 Moderate level exam 30 min    CMT:  14905 Chiropractic manipulative treatment 3-4 regions performed   Cervical: Diversified, C3 , C6, Supine -gentle, no cavitation  Thoracic: Diversified, T3, T5, T10, Prone-gentle no cavitation  Lumbar: Drop Table, L5, Prone. Gentle distraction L5-SIJ-x5    Modalities:  U/S to L/S spine for 4-5 min to assist the manipulation    Therapeutic procedures:  None    Response to Treatment  Reduction in symptoms as  reported by patient      Treatment plan and goals:  Goals:  STANDING: the patient specific goal is to attain the pre-injury status of 1/2 hours comfortably   Able to walk 20 minutes without back or leg pain.    Frequency of care  Duration of care is estimated to be 8 weeks, from the initial treatment.  It is estimated that the patient will need a total of 4-6 visits to resolve this episode.  For the initial therapeutic trial of care, the frequency is recommended at 1 X week or every other week, once daily.  A reevaluation would be clinically appropriate in 6 visits, to determine progress and further course of care.    In-Office Treatment  Evaluation  25754 Chiropractic manipulative treatment 3-4 regions performed   Cervical: Diversified, C3 , C6, Supine -gentle, no cavitation  Thoracic: Diversified, T3, T5, T10, Prone-gentle no cavitation  Lumbar: Drop Table, L5, Prone. Gentle distraction L5-SIJ-x5    Modalities:  U/S to L/S spine for 4-5 min to assist the manipulation      Recommendations:    Instructions:ice 20 minutes every other hour as needed    Follow-up:  Return to care in one week.       Discussed the assessment with the patient.      Disclaimer: This note consists of symbols derived from keyboarding, dictation and/or voice recognition software. As a result, there may be errors in the script that have gone undetected. Please consider this when interpreting information found in this chart.

## 2017-10-20 ENCOUNTER — THERAPY VISIT (OUTPATIENT)
Dept: CHIROPRACTIC MEDICINE | Facility: CLINIC | Age: 72
End: 2017-10-20
Payer: MEDICARE

## 2017-10-20 DIAGNOSIS — M99.02 THORACIC SEGMENT DYSFUNCTION: ICD-10-CM

## 2017-10-20 DIAGNOSIS — M54.2 CERVICALGIA: ICD-10-CM

## 2017-10-20 DIAGNOSIS — M99.01 CERVICAL SEGMENT DYSFUNCTION: Primary | ICD-10-CM

## 2017-10-20 DIAGNOSIS — M54.50 CHRONIC BILATERAL LOW BACK PAIN WITHOUT SCIATICA: ICD-10-CM

## 2017-10-20 DIAGNOSIS — G89.29 CHRONIC BILATERAL LOW BACK PAIN WITHOUT SCIATICA: ICD-10-CM

## 2017-10-20 PROCEDURE — 98941 CHIROPRACT MANJ 3-4 REGIONS: CPT | Mod: AT | Performed by: CHIROPRACTOR

## 2017-10-20 NOTE — PROGRESS NOTES
Visit #:  2 of 6 based on treatment plan 6-8 visits    Subjective:  Paco Eckert is a 72 year old male who is seen in f/u up for: neck and back pain.     Data Unavailable.     Since last visit on 10/10/2017,  Paco Eckert reports the following changes: Pain immediately after last treatment: 4/10 and their pain level today 4/10. Driving rated at a 4/10 painful and prior to initial visit 4/10 and prior to this incident it was 5/10.     Area of chief complaint:  Cervical, Thoracic and Lumbar :  Symptoms are graded at 3-4/10. The quality is described as stiff, achey, dull.  Motion has increased, but is still not normal, Lower cervical, mid-thoracic, and lower lumbar spine. Patient feels that they are slightly improved due to a slight reduction in symptoms.     Since last visit the patient feels that they are 10 percent  improved from last visit.       Objective:  The following was observed:    P: pain elicited on palpation, C6, T3, L5    A: static palpation demonstrates intersegmental asymmetry , C2, C6, T5, T7, L5, SIJ    R: motion palpation notes restricted motion-C6, T5, L5    T: muscle spasm at level(s): Lumbar erector spine, Sub-occipital, T-spine paraspinal and Traps Bilaterally      Assessment:    Segmental spinal dysfunction/restrictions found at:  C4   C6   T5  T7  L5  Sacrum    Diagnoses:    No diagnosis found.    Patient's condition:  Patient had restrictions pre-manipulation    Treatment effectiveness:  Post manipulation there is better intersegmental movement and Patient claims to feel looser post manipulation      Procedures:  CMT:  29326 Chiropractic manipulative treatment 3-4 regions performed   Cervical: Diversified, C3 , C6, Supine -gentle, no cavitation  Thoracic: Diversified, T3, T5, T10, Prone-gentle no cavitation  Lumbar: Drop Table, L5, Prone. Gentle distraction L5-SIJ-x5    Modalities:  U/S to L/S spine for 4-5 min to assist the manipulation    Therapeutic  procedures:  None      Prognosis: fair    Progress towards Goals: Patient is making slight progress towards the goal of STANDING: the patient specific goal is to attain the pre-injury status of 1/2 hours comfortably   Able to walk 20 minutes without back or leg pain.  .     Response to Treatment:   Patient tolerated the treatment well today.      Recommendations:    Instructions:ice 20 minutes every other hour as needed    Follow-up:  Return to care in 1-2 weeks.

## 2017-10-27 ENCOUNTER — OFFICE VISIT (OUTPATIENT)
Dept: PEDIATRICS | Facility: CLINIC | Age: 72
End: 2017-10-27
Payer: MEDICARE

## 2017-10-27 VITALS
HEIGHT: 66 IN | DIASTOLIC BLOOD PRESSURE: 60 MMHG | BODY MASS INDEX: 30.67 KG/M2 | TEMPERATURE: 99 F | SYSTOLIC BLOOD PRESSURE: 92 MMHG | WEIGHT: 190.8 LBS | OXYGEN SATURATION: 99 % | HEART RATE: 88 BPM

## 2017-10-27 DIAGNOSIS — M54.2 CERVICALGIA: Primary | ICD-10-CM

## 2017-10-27 DIAGNOSIS — I10 HYPERTENSION GOAL BP (BLOOD PRESSURE) < 140/90: ICD-10-CM

## 2017-10-27 DIAGNOSIS — R35.0 URINARY FREQUENCY: ICD-10-CM

## 2017-10-27 DIAGNOSIS — Z23 NEED FOR PROPHYLACTIC VACCINATION AND INOCULATION AGAINST INFLUENZA: ICD-10-CM

## 2017-10-27 PROCEDURE — 99214 OFFICE O/P EST MOD 30 MIN: CPT | Mod: 25 | Performed by: INTERNAL MEDICINE

## 2017-10-27 PROCEDURE — G0008 ADMIN INFLUENZA VIRUS VAC: HCPCS | Performed by: INTERNAL MEDICINE

## 2017-10-27 PROCEDURE — 90662 IIV NO PRSV INCREASED AG IM: CPT | Performed by: INTERNAL MEDICINE

## 2017-10-27 RX ORDER — TERAZOSIN 1 MG/1
CAPSULE ORAL
Qty: 70 CAPSULE | Refills: 0 | Status: SHIPPED | OUTPATIENT
Start: 2017-10-27 | End: 2018-03-22

## 2017-10-27 NOTE — PROGRESS NOTES
Injectable Influenza Immunization Documentation    1.  Is the person to be vaccinated sick today?   No    2. Does the person to be vaccinated have an allergy to a component   of the vaccine?   No  Egg Allergy Algorithm Link    3. Has the person to be vaccinated ever had a serious reaction   to influenza vaccine in the past?   No    4. Has the person to be vaccinated ever had Guillain-Barré syndrome?   No    Form completed by Yojana Cerna LPN        SUBJECTIVE:   Paco Eckert is a 72 year old male who presents to clinic today for the following health issues:      Patient here to follow up on back pain and neck pain:    Neck pain has been going on about 6 weeks about 1.5 months; improving, but will still flare up occaionally.  SOmetimes with pain in right forearm.  Improved with physical therapy and .   Was seen by chiropracter, and was given hydrocodone/acetaminophen; took this only a few times per day, and is done now.      low back pain:  Had an epidural steroid injection in January for his lumbar symptoms; thinks might want this again. (Originally scheduled by his pain and palliative clinic). On no current pain meds.     Also, he has additional complaints of frequent nighttime urination.  Tried flomax (tamsulosin) about 5 years ago; did not help. Of note, has history of prostate cancer, s/p lupron and xrt and seeded radiation.Does not bother him during the day.  Nighttime symptoms are the worst.     Problem list and histories reviewed & adjusted, as indicated.  Additional history: as documented    Patient Active Problem List   Diagnosis     Malignant neoplasm of prostate (H)     Gouty arthropathy     Other psoriasis     Osteoporosis     Leukopenia     Frequency of urination and polyuria     HYPERLIPIDEMIA LDL GOAL <130     Impaired fasting glucose     Fatty liver     Hypertension goal BP (blood pressure) < 140/90     Rectal cancer (H)     Cervical pain     Past Surgical History:   Procedure  Laterality Date     APPLY WOUND VAC N/A 10/21/2014    Procedure: APPLY WOUND VAC;  Surgeon: Mir Hernandez MD;  Location:  OR     COLONOSCOPY N/A 10/10/2014    Procedure: COMBINED COLONOSCOPY, SINGLE BIOPSY/POLYPECTOMY BY BIOPSY;  Surgeon: Mir Hernandez MD;  Location:  GI     COLOSTOMY N/A 10/21/2014    Procedure: COLOSTOMY;  Surgeon: Mir Hernandez MD;  Location:  OR     ORTHOPEDIC SURGERY Bilateral 2000 ?    rotater repair both     ORTHOPEDIC SURGERY Right     ankle      RESECTION ABDOMINAL PERINEAL N/A 10/21/2014    Procedure: RESECTION ABDOMINAL PERINEAL;  Surgeon: Mir Hernandez MD;  Location:  OR       Social History   Substance Use Topics     Smoking status: Never Smoker     Smokeless tobacco: Never Used     Alcohol use 0.0 oz/week     0 Standard drinks or equivalent per week      Comment: couple drinks/day     Family History   Problem Relation Age of Onset     DIABETES Brother      DIABETES Sister      Hypertension Sister      Hypertension Brother          Current Outpatient Prescriptions   Medication Sig Dispense Refill     terazosin (HYTRIN) 1 MG capsule Start with 1 tab (1 mg) daily for 1 week, 2 tabs (2 mg) daily for 1 week, 3 tabs (3 mg) daily for 1 week, then 4 tabs (4 mg) daily 70 capsule 0     loratadine-pseudoePHEDrine (CLARITIN-D 24-HOUR)  MG per 24 hr tablet Take 1 tablet by mouth daily       HYDROcodone-acetaminophen (NORCO) 5-325 MG per tablet Take 1-2 tablets by mouth every 4 hours as needed for moderate to severe pain maximum 4 tablet(s) per day 20 tablet 0     cyclobenzaprine (FLEXERIL) 10 MG tablet Take 1 tablet (10 mg) by mouth 3 times daily as needed for muscle spasms 30 tablet 0     clotrimazole (LOTRIMIN) 1 % cream Apply topically 2 times daily 15 g 1     simvastatin (ZOCOR) 40 MG tablet Take 1 tablet (40 mg) by mouth At Bedtime 90 tablet 3     clobetasol propionate 0.05 % FOAM   1     ibuprofen (IBU) 600 MG tablet Take 1 tablet by mouth every 6 hours as needed for  "pain. 60 tablet 0     Allergies   Allergen Reactions     Seasonal Allergies      BP Readings from Last 3 Encounters:   10/27/17 92/60   10/03/17 108/70   01/04/17 146/86    Wt Readings from Last 3 Encounters:   10/27/17 190 lb 12.8 oz (86.5 kg)   10/03/17 192 lb (87.1 kg)   12/07/16 199 lb (90.3 kg)                  Labs reviewed in EPIC        Reviewed and updated as needed this visit by clinical staff       Reviewed and updated as needed this visit by Provider         ROS:  C: NEGATIVE for fever, chills, change in weight  E/M: NEGATIVE for ear, mouth and throat problems  R: NEGATIVE for significant cough or SOB  CV: NEGATIVE for chest pain, palpitations or peripheral edema    OBJECTIVE:                                                    BP 92/60 (BP Location: Right arm, Patient Position: Chair, Cuff Size: Adult Large)  Pulse 88  Temp 99  F (37.2  C) (Tympanic)  Ht 5' 6\" (1.676 m)  Wt 190 lb 12.8 oz (86.5 kg)  SpO2 99%  BMI 30.8 kg/m2  Body mass index is 30.8 kg/(m^2).   GENERAL: healthy, alert, well nourished, well hydrated, no distress  HENT: ear canals- normal; TMs- normal; Nose- normal; Mouth- no ulcers, no lesions  NECK: no tenderness, no adenopathy, no asymmetry, no masses, no stiffness; thyroid- normal to palpation  RESP: lungs clear to auscultation - no rales, no rhonchi, no wheezes  CV: regular rates and rhythm, normal S1 S2, no S3 or S4 and no murmur, no click or rub -  ABDOMEN: soft, no tenderness, no  hepatosplenomegaly, no masses, normal bowel sounds    Diagnostic test results:  Diagnostic Test Results:  none        ASSESSMENT/PLAN:                                                    1. Need for prophylactic vaccination and inoculation against influenza    - FLU VACCINE, INCREASED ANTIGEN, PRESV FREE, AGE 65+ [29307]  - ADMIN INFLUENZA (For MEDICARE Patients ONLY) []    2. Urinary frequency  Begin trial of upward titration of hytrin (terazosin).  Follow up in 1 month. Stop ACE inhibitor due " to low blood pressure today .  - terazosin (HYTRIN) 1 MG capsule; Start with 1 tab (1 mg) daily for 1 week, 2 tabs (2 mg) daily for 1 week, 3 tabs (3 mg) daily for 1 week, then 4 tabs (4 mg) daily  Dispense: 70 capsule; Refill: 0    3. Neck pain: no significant radicular symptoms.  contiue chiropracter.  On no pain meds.    4.Lumbar pain:  S/p epidural steroid injection with good results; would like to try another.  He can call his pain clinic to help set this up.     See Patient Instructions    Emeka Hale MD  [facility]

## 2017-10-27 NOTE — MR AVS SNAPSHOT
After Visit Summary   10/27/2017    Paco Eckert    MRN: [id number]           Patient Information     Date Of Birth          1945        Visit Information        Provider Department      10/27/2017 10:40 AM Emeka Hale MD [facility]        Today's Diagnoses     Need for prophylactic vaccination and inoculation against influenza    -  1    Urinary frequency          Care Instructions    Stop the lisinopril.    Begin to taper up on the hytrin (terazosin).  (Watch out for dizziness due to low blood pressure).    Follow up in 1 month.    Call Pain clinic for another epidural steroid injection on your lumbar area.    Flu shot today.    Contineu with chiropracter for your neck.    Emeka Hale MD  Internal Medicine and Pediatrics             Follow-ups after your visit        Who to contact     If you have questions or need follow up information about today's clinic visit or your schedule please contact [facility] directly at [phone].  Normal or non-critical lab and imaging results will be communicated to you by Sittercityhart, letter or phone within 4 business days after the clinic has received the results. If you do not hear from us within 7 days, please contact the clinic through Sittercityhart or phone. If you have a critical or abnormal lab result, we will notify you by phone as soon as possible.  Submit refill requests through [employer] or call your pharmacy and they will forward the refill request to us. Please allow 3 business days for your refill to be completed.          Additional Information About Your Visit        MyChart Information     [employer] gives you secure access to your electronic health record. If you see a primary care provider, you can also send messages to your care team and make appointments. If you have questions, please call your primary care clinic.  If you do not have a primary care provider, please call [phone] and they will assist you.        Care  "EveryWhere ID     This is your Care EveryWhere ID. This could be used by other organizations to access your [place] medical records  [id number]        Your Vitals Were     Pulse Temperature Height Pulse Oximetry BMI (Body Mass Index)       88 99  F (37.2  C) (Tympanic) 5' 6\" (1.676 m) 99% 30.8 kg/m2        Blood Pressure from Last 3 Encounters:   10/27/17 92/60   10/03/17 108/70   01/04/17 146/86    Weight from Last 3 Encounters:   10/27/17 190 lb 12.8 oz (86.5 kg)   10/03/17 192 lb (87.1 kg)   12/07/16 199 lb (90.3 kg)              We Performed the Following     ADMIN INFLUENZA (For MEDICARE Patients ONLY) []     FLU VACCINE, INCREASED ANTIGEN, PRESV FREE, AGE 65+ [29481]          Today's Medication Changes          These changes are accurate as of: 10/27/17 11:07 AM.  If you have any questions, ask your nurse or doctor.               Start taking these medicines.        Dose/Directions    terazosin 1 MG capsule   Commonly known as:  HYTRIN   Used for:  Urinary frequency   Started by:  Emeka Hale MD        Start with 1 tab (1 mg) daily for 1 week, 2 tabs (2 mg) daily for 1 week, 3 tabs (3 mg) daily for 1 week, then 4 tabs (4 mg) daily   Quantity:  70 capsule   Refills:  0         Stop taking these medicines if you haven't already. Please contact your care team if you have questions.     lisinopril 10 MG tablet   Commonly known as:  PRINIVIL/ZESTRIL   Stopped by:  Emeka Hale MD                Where to get your medicines      These medications were sent to [id number] - [place] - [address]  [address], [place] [address]     Phone:  [phone]     terazosin 1 MG capsule                Primary Care Provider Office Phone # Fax #    Emeka Hale [phone] [address] [place] [address]        Equal Access to Services     [facility] JERMAINE AH: brittany Calvin, [facility] " lissett carbajal haytwila barriosdieudonne michel ah. [facility] [phone].    ATENCIÓN: Si joe easton, tiene a squires disposición servicios gratuitos de asistencia lingüística. Wili al [phone].    We comply with applicable federal civil rights laws and [place] laws. We do not discriminate on the basis of race, color, national origin, age, disability, sex, sexual orientation, or gender identity.            Thank you!     Thank you for choosing [facility] [place]  for your care. Our goal is always to provide you with excellent care. Hearing back from our patients is one way we can continue to improve our services. Please take a few minutes to complete the written survey that you may receive in the mail after your visit with us. Thank you!             Your Updated Medication List - Protect others around you: Learn how to safely use, store and throw away your medicines at www.disposemymeds.org.          This list is accurate as of: 10/27/17 11:07 AM.  Always use your most recent med list.                   Brand Name Dispense Instructions for use Diagnosis    clobetasol propionate 0.05 % Foam           clotrimazole 1 % cream    LOTRIMIN    15 g    Apply topically 2 times daily    Candidal dermatitis       cyclobenzaprine 10 MG tablet    FLEXERIL    30 tablet    Take 1 tablet (10 mg) by mouth 3 times daily as needed for muscle spasms    Strain of trapezius muscle, unspecified laterality, initial encounter       HYDROcodone-acetaminophen 5-325 MG per tablet    NORCO    20 tablet    Take 1-2 tablets by mouth every 4 hours as needed for moderate to severe pain maximum 4 tablet(s) per day    Strain of trapezius muscle, unspecified laterality, initial encounter       ibuprofen 600 MG tablet    IBU    60 tablet    Take 1 tablet by mouth every 6 hours as needed for pain.    Left-sided chest wall pain       loratadine-pseudoePHEDrine  MG per 24 hr tablet    CLARITIN-D 24-hour     Take 1 tablet by mouth  daily        simvastatin 40 MG tablet    ZOCOR    90 tablet    Take 1 tablet (40 mg) by mouth At Bedtime    Hyperlipidemia LDL goal <130       terazosin 1 MG capsule    HYTRIN    70 capsule    Start with 1 tab (1 mg) daily for 1 week, 2 tabs (2 mg) daily for 1 week, 3 tabs (3 mg) daily for 1 week, then 4 tabs (4 mg) daily    Urinary frequency

## 2017-10-27 NOTE — NURSING NOTE
"Chief Complaint   Patient presents with     Back Pain     Flu Shot       Initial BP 92/60 (BP Location: Right arm, Patient Position: Chair, Cuff Size: Adult Large)  Pulse 88  Temp 99  F (37.2  C) (Tympanic)  Ht 5' 6\" (1.676 m)  Wt 190 lb 12.8 oz (86.5 kg)  SpO2 99%  BMI 30.8 kg/m2 Estimated body mass index is 30.8 kg/(m^2) as calculated from the following:    Height as of this encounter: 5' 6\" (1.676 m).    Weight as of this encounter: 190 lb 12.8 oz (86.5 kg).  Medication Reconciliation: complete   Yojana Cerna LPN      "

## 2017-10-27 NOTE — PATIENT INSTRUCTIONS
Stop the lisinopril.    Begin to taper up on the hytrin (terazosin).  (Watch out for dizziness due to low blood pressure).    Follow up in 1 month.    Call Pain clinic for another epidural steroid injection on your lumbar area.    Flu shot today.    Contineu with chiropracter for your neck.    Emeka Hale MD  Internal Medicine and Pediatrics

## 2017-10-31 RX ORDER — TERAZOSIN 1 MG/1
CAPSULE ORAL
Qty: 225 CAPSULE | Refills: 0 | OUTPATIENT
Start: 2017-10-31

## 2017-10-31 NOTE — TELEPHONE ENCOUNTER
Asking for 90 days, but this is declined due to patient needs  Appointment for follow up in one month.  Jacqueline Jimenez, RN  Triage Nurse

## 2017-11-03 ENCOUNTER — THERAPY VISIT (OUTPATIENT)
Dept: CHIROPRACTIC MEDICINE | Facility: CLINIC | Age: 72
End: 2017-11-03
Payer: MEDICARE

## 2017-11-03 DIAGNOSIS — M54.50 CHRONIC BILATERAL LOW BACK PAIN WITHOUT SCIATICA: ICD-10-CM

## 2017-11-03 DIAGNOSIS — M99.02 THORACIC SEGMENT DYSFUNCTION: ICD-10-CM

## 2017-11-03 DIAGNOSIS — G89.29 CHRONIC BILATERAL LOW BACK PAIN WITHOUT SCIATICA: ICD-10-CM

## 2017-11-03 DIAGNOSIS — M99.01 CERVICAL SEGMENT DYSFUNCTION: ICD-10-CM

## 2017-11-03 DIAGNOSIS — M99.03 SOMATIC DYSFUNCTION OF LUMBAR REGION: Primary | ICD-10-CM

## 2017-11-03 PROCEDURE — 98941 CHIROPRACT MANJ 3-4 REGIONS: CPT | Mod: AT | Performed by: CHIROPRACTOR

## 2017-11-03 NOTE — PROGRESS NOTES
Visit #:  3 of 6 based on treatment plan 6-8 visits    Subjective:  Paco Eckert is a 72 year old male who is seen in f/u up for: neck and back pain.     Data Unavailable.     Since last visit on 10/20/2017,  Paco Eckert reports the following changes: Pain immediately after last treatment: 4/10 and their pain level today 4/10. Driving rated at a 4/10 painful and prior to initial visit 4/10 and prior to this incident it was 5/10. Patient reports his neck was more sore after biking at the club this morning.     Area of chief complaint:  Cervical, Thoracic and Lumbar :  Symptoms are graded at 3-4/10. The quality is described as stiff, achey, dull.  Motion has increased, but is still not normal, Lower cervical, mid-thoracic, and lower lumbar spine. Patient feels that they are slightly improved due to a slight reduction in symptoms.     Since last visit the patient feels that they are 10 percent  improved from last visit.       Objective:  The following was observed:    P: pain elicited on palpation, C6, T3, L5    A: static palpation demonstrates intersegmental asymmetry , C2, C6, T5, T7, L5, SIJ    R: motion palpation notes restricted motion-C6, T5, L5    T: muscle spasm at level(s): Lumbar erector spine, Sub-occipital, T-spine paraspinal and Traps Bilaterally      Assessment:    Segmental spinal dysfunction/restrictions found at:  C4   C6   T5  T7  L5  Sacrum    Diagnoses:    No diagnosis found.    Patient's condition:  Patient had restrictions pre-manipulation    Treatment effectiveness:  Post manipulation there is better intersegmental movement and Patient claims to feel looser post manipulation      Procedures:  CMT:  08246 Chiropractic manipulative treatment 3-4 regions performed   Cervical: Diversified, C3 , C6, Supine -gentle, no cavitation  Thoracic: Diversified, T3, T5, T10, Prone-gentle no cavitation  Lumbar: Drop Table, L5, Prone. Gentle distraction L5-SIJ-x5    Modalities:  none    Therapeutic  procedures:  None      Prognosis: fair    Progress towards Goals: Patient is making slight progress towards the goal of STANDING: the patient specific goal is to attain the pre-injury status of 1/2 hours comfortably   Able to walk 20 minutes without back or leg pain.  .     Response to Treatment:   Patient tolerated the treatment well today.      Recommendations:    Instructions:ice 20 minutes every other hour as needed    Follow-up:  Return to care in 1-2 weeks.

## 2017-11-09 ENCOUNTER — THERAPY VISIT (OUTPATIENT)
Dept: CHIROPRACTIC MEDICINE | Facility: CLINIC | Age: 72
End: 2017-11-09
Payer: MEDICARE

## 2017-11-09 DIAGNOSIS — M99.02 THORACIC SEGMENT DYSFUNCTION: ICD-10-CM

## 2017-11-09 DIAGNOSIS — M54.2 CERVICALGIA: ICD-10-CM

## 2017-11-09 DIAGNOSIS — M99.01 CERVICAL SEGMENT DYSFUNCTION: Primary | ICD-10-CM

## 2017-11-09 DIAGNOSIS — M99.03 SOMATIC DYSFUNCTION OF LUMBAR REGION: ICD-10-CM

## 2017-11-09 PROCEDURE — 98941 CHIROPRACT MANJ 3-4 REGIONS: CPT | Mod: AT | Performed by: CHIROPRACTOR

## 2017-11-09 NOTE — PROGRESS NOTES
Visit #:  4 of 6 based on treatment plan 6-8 visits    Subjective:  Paco Eckert is a 72 year old male who is seen in f/u up for: neck and back pain.     Data Unavailable.     Since last visit on 11/3/17,  Paco Eckert reports the following changes: Pain immediately after last treatment: 4/10 and their pain level today 3/10. Driving rated at a 3/10 painful and prior to initial visit 4/10 and prior to this incident it was 5/10. Patient reports his neck was more sore after biking at the club this morning.     Area of chief complaint:  Cervical, Thoracic and Lumbar :  Symptoms are graded at 3/10. The quality is described as stiff, achey, dull.  Motion has increased, but is still not normal, Lower cervical, mid-thoracic, and lower lumbar spine. Patient feels that they are slightly improved due to a slight reduction in symptoms.     Since last visit the patient feels that they are 20 percent  improved from last visit.       Objective:  The following was observed:    P: pain elicited on palpation, C6, T3, L5    A: static palpation demonstrates intersegmental asymmetry , C2, C6, T5, T7, L5, SIJ    R: motion palpation notes restricted motion-C6, T5, L5    T: muscle spasm at level(s): Lumbar erector spine, Sub-occipital, T-spine paraspinal and Traps Bilaterally      Assessment:    Segmental spinal dysfunction/restrictions found at:  C4   C6   T5  T7  L5  Sacrum    Diagnoses:    No diagnosis found.    Patient's condition:  Patient had restrictions pre-manipulation    Treatment effectiveness:  Post manipulation there is better intersegmental movement and Patient claims to feel looser post manipulation      Procedures:  CMT:  48133 Chiropractic manipulative treatment 3-4 regions performed   Cervical: Diversified, C3 , C6, Supine -gentle, no cavitation  Thoracic: Diversified, T3, T5, T10, Prone-gentle no cavitation  Lumbar: Drop Table, L5, Prone. Gentle distraction L5-SIJ-x5    Modalities:  none    Therapeutic  procedures:  None      Prognosis: fair    Progress towards Goals: Patient is making slight progress towards the goal of STANDING: the patient specific goal is to attain the pre-injury status of 1/2 hours comfortably   Able to walk 20 minutes without back or leg pain.  .     Response to Treatment:   Patient tolerated the treatment well today.      Recommendations:    Instructions:ice 20 minutes every other hour as needed    Follow-up:  Return to care in 1-2 weeks.

## 2017-11-16 ENCOUNTER — THERAPY VISIT (OUTPATIENT)
Dept: CHIROPRACTIC MEDICINE | Facility: CLINIC | Age: 72
End: 2017-11-16
Payer: MEDICARE

## 2017-11-16 DIAGNOSIS — M99.03 SOMATIC DYSFUNCTION OF LUMBAR REGION: Primary | ICD-10-CM

## 2017-11-16 DIAGNOSIS — M54.2 CERVICALGIA: ICD-10-CM

## 2017-11-16 DIAGNOSIS — M54.50 CHRONIC BILATERAL LOW BACK PAIN WITHOUT SCIATICA: ICD-10-CM

## 2017-11-16 DIAGNOSIS — M99.01 CERVICAL SEGMENT DYSFUNCTION: ICD-10-CM

## 2017-11-16 DIAGNOSIS — M99.02 THORACIC SEGMENT DYSFUNCTION: ICD-10-CM

## 2017-11-16 DIAGNOSIS — G89.29 CHRONIC BILATERAL LOW BACK PAIN WITHOUT SCIATICA: ICD-10-CM

## 2017-11-16 PROCEDURE — 98941 CHIROPRACT MANJ 3-4 REGIONS: CPT | Mod: AT | Performed by: CHIROPRACTOR

## 2017-11-16 NOTE — PROGRESS NOTES
Visit #:  5 of 6 based on treatment plan 6-8 visits    Subjective:  Paco Eckert is a 72 year old male who is seen in f/u up for: neck and back pain.     Data Unavailable.     Since last visit on 11/9/17,  Paco Eckert reports the following changes: Pain immediately after last treatment: 3/10 and their pain level today 2/10. Driving rated at a 3/10 painful and prior to initial visit 4/10 and prior to this incident it was 5/10. Patient reports his neck was more sore after biking at the club this morning.     Area of chief complaint:  Cervical, Thoracic and Lumbar :  Symptoms are graded at 2/10. The quality is described as stiff, achey, dull.  Motion has increased, but is still not normal, Lower cervical, mid-thoracic, and lower lumbar spine. Patient feels that they are slightly improved due to a slight reduction in symptoms.     Since last visit the patient feels that they are 20 percent  improved from last visit.       Objective:  The following was observed:    P: pain elicited on palpation, C6, T3, L5    A: static palpation demonstrates intersegmental asymmetry , C2, C6, T5, T7, L5, SIJ    R: motion palpation notes restricted motion-C6, T5, L5    T: muscle spasm at level(s): Lumbar erector spine, Sub-occipital, T-spine paraspinal and Traps Bilaterally      Assessment:    Segmental spinal dysfunction/restrictions found at:  C4   C6   T5  T7  L5  Sacrum    Diagnoses:    No diagnosis found.    Patient's condition:  Patient had restrictions pre-manipulation    Treatment effectiveness:  Post manipulation there is better intersegmental movement and Patient claims to feel looser post manipulation      Procedures:  CMT:  02891 Chiropractic manipulative treatment 3-4 regions performed   Cervical: Diversified, C3 , C6, Supine -gentle, no cavitation  Thoracic: Diversified, T3, T5, T10, Prone-gentle no cavitation  Lumbar: Drop Table, L5, Prone. Gentle distraction L5-SIJ-x5    Modalities:  none    Therapeutic  procedures:  None      Prognosis: fair    Progress towards Goals: Patient is making slight progress towards the goal of STANDING: the patient specific goal is to attain the pre-injury status of 1/2 hours comfortably   Able to walk 20 minutes without back or leg pain.  .     Response to Treatment:   Patient tolerated the treatment well today.      Recommendations:    Instructions:ice 20 minutes every other hour as needed    Follow-up:  Return to care in 1-2 weeks.

## 2017-12-01 ENCOUNTER — OFFICE VISIT (OUTPATIENT)
Dept: PEDIATRICS | Facility: CLINIC | Age: 72
End: 2017-12-01
Payer: MEDICARE

## 2017-12-01 VITALS
DIASTOLIC BLOOD PRESSURE: 62 MMHG | SYSTOLIC BLOOD PRESSURE: 100 MMHG | OXYGEN SATURATION: 99 % | HEART RATE: 88 BPM | WEIGHT: 190 LBS | TEMPERATURE: 97.5 F | BODY MASS INDEX: 30.53 KG/M2 | HEIGHT: 66 IN

## 2017-12-01 DIAGNOSIS — R35.0 FREQUENCY OF URINATION AND POLYURIA: Primary | ICD-10-CM

## 2017-12-01 DIAGNOSIS — C61 MALIGNANT NEOPLASM OF PROSTATE (H): ICD-10-CM

## 2017-12-01 DIAGNOSIS — I10 HYPERTENSION GOAL BP (BLOOD PRESSURE) < 140/90: ICD-10-CM

## 2017-12-01 DIAGNOSIS — R35.89 FREQUENCY OF URINATION AND POLYURIA: ICD-10-CM

## 2017-12-01 DIAGNOSIS — R35.0 FREQUENCY OF URINATION AND POLYURIA: ICD-10-CM

## 2017-12-01 DIAGNOSIS — R35.89 FREQUENCY OF URINATION AND POLYURIA: Primary | ICD-10-CM

## 2017-12-01 DIAGNOSIS — M19.042 PRIMARY OSTEOARTHRITIS OF BOTH HANDS: ICD-10-CM

## 2017-12-01 DIAGNOSIS — M19.041 PRIMARY OSTEOARTHRITIS OF BOTH HANDS: ICD-10-CM

## 2017-12-01 PROCEDURE — 99214 OFFICE O/P EST MOD 30 MIN: CPT | Performed by: INTERNAL MEDICINE

## 2017-12-01 RX ORDER — TAMSULOSIN HYDROCHLORIDE 0.4 MG/1
0.4 CAPSULE ORAL DAILY
Qty: 60 CAPSULE | Refills: 2 | Status: SHIPPED | OUTPATIENT
Start: 2017-12-01 | End: 2018-03-22

## 2017-12-01 NOTE — MR AVS SNAPSHOT
After Visit Summary   12/1/2017    Paco Eckert    MRN: [id number]           Patient Information     Date Of Birth          1945        Visit Information        Provider Department      12/1/2017 10:20 AM Emeka Hale MD [facility]        Today's Diagnoses     Frequency of urination and polyuria    -  1      Care Instructions    We can try flomax (tamsulosin) for a month. If no response, we can consider adding Ditropan or Detrol for possible bladder spasms.    FOr your hands:    Try a Paraffin Wax Bath every AM, and may also try ibuprofen and aleve (naproxen).    For your elbow, would try a elbow neoprene sleeve or a wrist splint at night.    Emeka Hale MD  Internal Medicine and Pediatrics             Follow-ups after your visit        Your next 10 appointments already scheduled     Dec 05, 2017 10:30 AM CST   [facility] with Cierra Sheets DC   [facility] [place] ([facility]  )    [address]  [place] [address] [phone]              Who to contact     If you have questions or need follow up information about today's clinic visit or your schedule please contact [facility] directly at [phone].  Normal or non-critical lab and imaging results will be communicated to you by MyChart, letter or phone within 4 business days after the clinic has received the results. If you do not hear from us within 7 days, please contact the clinic through [employer] or phone. If you have a critical or abnormal lab result, we will notify you by phone as soon as possible.  Submit refill requests through [employer] or call your pharmacy and they will forward the refill request to us. Please allow 3 business days for your refill to be completed.          Additional Information About Your Visit        MyChart Information     [employer] gives you secure access to your electronic health record. If you see a primary care provider, you  "can also send messages to your care team and make appointments. If you have questions, please call your primary care clinic.  If you do not have a primary care provider, please call [phone] and they will assist you.        Care EveryWhere ID     This is your Care EveryWhere ID. This could be used by other organizations to access your [facility] medical records  [id number]        Your Vitals Were     Pulse Temperature Height Pulse Oximetry BMI (Body Mass Index)       88 97.5  F (36.4  C) (Oral) 5' 6\" (1.676 m) 99% 30.67 kg/m2        Blood Pressure from Last 3 Encounters:   12/01/17 100/62   10/27/17 92/60   10/03/17 108/70    Weight from Last 3 Encounters:   12/01/17 190 lb (86.2 kg)   10/27/17 190 lb 12.8 oz (86.5 kg)   10/03/17 192 lb (87.1 kg)              Today, you had the following     No orders found for display         Today's Medication Changes          These changes are accurate as of: 12/1/17 10:40 AM.  If you have any questions, ask your nurse or doctor.               Start taking these medicines.        Dose/Directions    tamsulosin 0.4 MG capsule   Commonly known as:  FLOMAX   Used for:  Frequency of urination and polyuria   Started by:  Emeka Hale MD        Dose:  0.4 mg   Take 1 capsule (0.4 mg) by mouth daily For week 1; if not helping, may take 2 tabs thereafter.   Quantity:  60 capsule   Refills:  2            Where to get your medicines      These medications were sent to [id number] - [place] - [address] AT [address]  [address], [place] [address]     Phone:  [phone]     tamsulosin 0.4 MG capsule                Primary Care Provider Office Phone # Fax #    Emeka Hale [phone]       [facility] [address] [place] [address]        Equal Access to Services     [facility]: Hadii sarah gill hadasho Soomaali, waaxda luqadaha, qaybta kaalmaorly jenkins, lissett kimaan ah. So [facility] " [phone].    ATENCIÓN: Si joe easton, tiene a squires disposición servicios gratuitos de asistencia lingüística. Wili calix [phone].    We comply with applicable federal civil rights laws and [place] laws. We do not discriminate on the basis of race, color, national origin, age, disability, sex, sexual orientation, or gender identity.            Thank you!     Thank you for choosing [facility] [place]  for your care. Our goal is always to provide you with excellent care. Hearing back from our patients is one way we can continue to improve our services. Please take a few minutes to complete the written survey that you may receive in the mail after your visit with us. Thank you!             Your Updated Medication List - Protect others around you: Learn how to safely use, store and throw away your medicines at www.disposemymeds.org.          This list is accurate as of: 12/1/17 10:40 AM.  Always use your most recent med list.                   Brand Name Dispense Instructions for use Diagnosis    clobetasol propionate 0.05 % Foam           clotrimazole 1 % cream    LOTRIMIN    15 g    Apply topically 2 times daily    Candidal dermatitis       cyclobenzaprine 10 MG tablet    FLEXERIL    30 tablet    Take 1 tablet (10 mg) by mouth 3 times daily as needed for muscle spasms    Strain of trapezius muscle, unspecified laterality, initial encounter       HYDROcodone-acetaminophen 5-325 MG per tablet    NORCO    20 tablet    Take 1-2 tablets by mouth every 4 hours as needed for moderate to severe pain maximum 4 tablet(s) per day    Strain of trapezius muscle, unspecified laterality, initial encounter       ibuprofen 600 MG tablet    IBU    60 tablet    Take 1 tablet by mouth every 6 hours as needed for pain.    Left-sided chest wall pain       loratadine-pseudoePHEDrine  MG per 24 hr tablet    CLARITIN-D 24-hour     Take 1 tablet by mouth daily        simvastatin 40 MG tablet    ZOCOR    90 tablet    Take 1  tablet (40 mg) by mouth At Bedtime    Hyperlipidemia LDL goal <130       tamsulosin 0.4 MG capsule    FLOMAX    60 capsule    Take 1 capsule (0.4 mg) by mouth daily For week 1; if not helping, may take 2 tabs thereafter.    Frequency of urination and polyuria       terazosin 1 MG capsule    HYTRIN    70 capsule    Start with 1 tab (1 mg) daily for 1 week, 2 tabs (2 mg) daily for 1 week, 3 tabs (3 mg) daily for 1 week, then 4 tabs (4 mg) daily    Urinary frequency

## 2017-12-01 NOTE — NURSING NOTE
"Chief Complaint   Patient presents with     Recheck Medication     terazosin       Initial /62 (BP Location: Right arm, Cuff Size: Adult Regular)  Pulse 88  Temp 97.5  F (36.4  C) (Oral)  Ht 5' 6\" (1.676 m)  Wt 190 lb (86.2 kg)  SpO2 99%  BMI 30.67 kg/m2 Estimated body mass index is 30.67 kg/(m^2) as calculated from the following:    Height as of this encounter: 5' 6\" (1.676 m).    Weight as of this encounter: 190 lb (86.2 kg).  Medication Reconciliation: complete     Mima Laguna MA   December 1, 2017,  10:19 AM    "

## 2017-12-01 NOTE — PROGRESS NOTES
SUBJECTIVE:   Paco Eckert is a 72 year old male who presents to clinic today for the following health issues:      Medication Followup of terazosin    Taking Medication as prescribed: yes, but hasn't for the past couple days    Side Effects:  nightmares    Medication Helping Symptoms:  Barely         Started on hytrin (terazosin), but blood pressure went up, so started back on the lisinopril.    Did not notice any improvement with urination. Took 1, then 2, then 3, then 4 tabs; no significant changes.       Currenlty still waking 4-5 times per night to urinate.  Takes Advil PM.  Does drink a lot of coffee and notes this worsens symptoms.  No burning.  No accidents.     Also, he has additional complaints of troble with his hands;     Problem list and histories reviewed & adjusted, as indicated.  Additional history: as documented    Patient Active Problem List   Diagnosis     Malignant neoplasm of prostate (H)     Gouty arthropathy     Other psoriasis     Osteoporosis     Leukopenia     Frequency of urination and polyuria     HYPERLIPIDEMIA LDL GOAL <130     Impaired fasting glucose     Fatty liver     Hypertension goal BP (blood pressure) < 140/90     Rectal cancer (H)     Cervical pain     Past Surgical History:   Procedure Laterality Date     APPLY WOUND VAC N/A 10/21/2014    Procedure: APPLY WOUND VAC;  Surgeon: Mri Hernandez MD;  Location:  OR     COLONOSCOPY N/A 10/10/2014    Procedure: COMBINED COLONOSCOPY, SINGLE BIOPSY/POLYPECTOMY BY BIOPSY;  Surgeon: Mir Hernandez MD;  Location:  GI     COLOSTOMY N/A 10/21/2014    Procedure: COLOSTOMY;  Surgeon: Mir Hernandez MD;  Location:  OR     ORTHOPEDIC SURGERY Bilateral 2000 ?    rotater repair both     ORTHOPEDIC SURGERY Right     ankle      RESECTION ABDOMINAL PERINEAL N/A 10/21/2014    Procedure: RESECTION ABDOMINAL PERINEAL;  Surgeon: Mir Hernandez MD;  Location:  OR       Social History   Substance Use Topics     Smoking status: Never  Smoker     Smokeless tobacco: Never Used     Alcohol use 0.0 oz/week     0 Standard drinks or equivalent per week      Comment: couple drinks/day     Family History   Problem Relation Age of Onset     DIABETES Brother      DIABETES Sister      Hypertension Sister      Hypertension Brother          Current Outpatient Prescriptions   Medication Sig Dispense Refill     tamsulosin (FLOMAX) 0.4 MG capsule Take 1 capsule (0.4 mg) by mouth daily For week 1; if not helping, may take 2 tabs thereafter. 60 capsule 2     terazosin (HYTRIN) 1 MG capsule Start with 1 tab (1 mg) daily for 1 week, 2 tabs (2 mg) daily for 1 week, 3 tabs (3 mg) daily for 1 week, then 4 tabs (4 mg) daily 70 capsule 0     loratadine-pseudoePHEDrine (CLARITIN-D 24-HOUR)  MG per 24 hr tablet Take 1 tablet by mouth daily       HYDROcodone-acetaminophen (NORCO) 5-325 MG per tablet Take 1-2 tablets by mouth every 4 hours as needed for moderate to severe pain maximum 4 tablet(s) per day 20 tablet 0     cyclobenzaprine (FLEXERIL) 10 MG tablet Take 1 tablet (10 mg) by mouth 3 times daily as needed for muscle spasms 30 tablet 0     clotrimazole (LOTRIMIN) 1 % cream Apply topically 2 times daily 15 g 1     simvastatin (ZOCOR) 40 MG tablet Take 1 tablet (40 mg) by mouth At Bedtime 90 tablet 3     clobetasol propionate 0.05 % FOAM   1     ibuprofen (IBU) 600 MG tablet Take 1 tablet by mouth every 6 hours as needed for pain. 60 tablet 0     Allergies   Allergen Reactions     Seasonal Allergies      BP Readings from Last 3 Encounters:   12/01/17 100/62   10/27/17 92/60   10/03/17 108/70    Wt Readings from Last 3 Encounters:   12/01/17 190 lb (86.2 kg)   10/27/17 190 lb 12.8 oz (86.5 kg)   10/03/17 192 lb (87.1 kg)                  Labs reviewed in EPIC        Reviewed and updated as needed this visit by clinical staffTobacco  Allergies  Meds  Med Hx  Surg Hx  Fam Hx  Soc Hx      Reviewed and updated as needed this visit by Provider         CINDI:  C:  "NEGATIVE for fever, chills, change in weight  E/M: NEGATIVE for ear, mouth and throat problems  R: NEGATIVE for significant cough or SOB  CV: NEGATIVE for chest pain, palpitations or peripheral edema    OBJECTIVE:                                                    /62 (BP Location: Right arm, Cuff Size: Adult Regular)  Pulse 88  Temp 97.5  F (36.4  C) (Oral)  Ht 5' 6\" (1.676 m)  Wt 190 lb (86.2 kg)  SpO2 99%  BMI 30.67 kg/m2  Body mass index is 30.67 kg/(m^2).   GENERAL: healthy, alert, well nourished, well hydrated, no distress  HENT: ear canals- normal; TMs- normal; Nose- normal; Mouth- no ulcers, no lesions  NECK: no tenderness, no adenopathy, no asymmetry, no masses, no stiffness; thyroid- normal to palpation  RESP: lungs clear to auscultation - no rales, no rhonchi, no wheezes  CV: regular rates and rhythm, normal S1 S2, no S3 or S4 and no murmur, no click or rub -  ABDOMEN: soft, no tenderness, no  hepatosplenomegaly, no masses, normal bowel sounds    Diagnostic test results:  Diagnostic Test Results:  none        ASSESSMENT/PLAN:                                                    1. Frequency of urination and polyuria  Will begin trial of flomax (tamsulosin) and if not better, add ditropan or detrol; has failed hytrin (terazosin).    - tamsulosin (FLOMAX) 0.4 MG capsule; Take 1 capsule (0.4 mg) by mouth daily For week 1; if not helping, may take 2 tabs thereafter.  Dispense: 60 capsule; Refill: 2      2.  blood pressure: well controlled; no hypotensive episodes remaining on ACE inhibitor.    3.  Hand osteoarthritis:  Advised paraffin wax baths.    4.  Elbow pain, numbness of hands:  Trial of elbow band or wrist splint and nonsteroidals (like ibuprofen or naproxen).    5.  Malignant neoplasm of prostate:  Management per urology.  No further treatment needed.     Patient Instructions   We can try flomax (tamsulosin) for a month. If no response, we can consider adding Ditropan or Detrol for possible " bladder spasms.    FOr your hands:    Try a Paraffin Wax Bath every AM, and may also try ibuprofen and aleve (naproxen).    For your elbow, would try a elbow neoprene sleeve or a wrist splint at night.    Emeka Hale MD  Internal Medicine and Pediatrics          See Patient Instructions    Emeka Hale MD  [facility]

## 2017-12-01 NOTE — PATIENT INSTRUCTIONS
We can try flomax (tamsulosin) for a month. If no response, we can consider adding Ditropan or Detrol for possible bladder spasms.    FOr your hands:    Try a Paraffin Wax Bath every AM, and may also try ibuprofen and aleve (naproxen).    For your elbow, would try a elbow neoprene sleeve or a wrist splint at night.    Emeka Hale MD  Internal Medicine and Pediatrics

## 2017-12-05 ENCOUNTER — THERAPY VISIT (OUTPATIENT)
Dept: CHIROPRACTIC MEDICINE | Facility: CLINIC | Age: 72
End: 2017-12-05
Payer: MEDICARE

## 2017-12-05 ENCOUNTER — TELEPHONE (OUTPATIENT)
Dept: NEUROSURGERY | Facility: CLINIC | Age: 72
End: 2017-12-05

## 2017-12-05 DIAGNOSIS — M54.50 LUMBAGO: ICD-10-CM

## 2017-12-05 DIAGNOSIS — M54.16 LUMBAR RADICULAR PAIN: Primary | ICD-10-CM

## 2017-12-05 DIAGNOSIS — M99.03 SOMATIC DYSFUNCTION OF LUMBAR REGION: Primary | ICD-10-CM

## 2017-12-05 DIAGNOSIS — M54.2 CERVICALGIA: ICD-10-CM

## 2017-12-05 DIAGNOSIS — M99.02 THORACIC SEGMENT DYSFUNCTION: ICD-10-CM

## 2017-12-05 PROCEDURE — 98941 CHIROPRACT MANJ 3-4 REGIONS: CPT | Mod: AT | Performed by: CHIROPRACTOR

## 2017-12-05 RX ORDER — TAMSULOSIN HYDROCHLORIDE 0.4 MG/1
CAPSULE ORAL
Qty: 180 CAPSULE | Refills: 2 | OUTPATIENT
Start: 2017-12-05

## 2017-12-05 NOTE — PROGRESS NOTES
Visit #:  6 of 6 based on treatment plan 6-8 visits    Subjective:  Paco Eckert is a 72 year old male who is seen in f/u up for: neck and back pain.     Data Unavailable.     Since last visit on 11/16/17,  Paco Eckert reports the following changes: Pain immediately after last treatment: 3/10 and their pain level today 2/10. Driving rated at a 3/10 painful and prior to initial visit 4/10 and prior to this incident it was 5/10. Patient reports his neck was more sore after biking at the club this morning.     Area of chief complaint:  Cervical, Thoracic and Lumbar :  Symptoms are graded at 2-3/10. The quality is described as stiff, achey, dull.  Motion has increased, but is still not normal, Lower cervical, mid-thoracic, and lower lumbar spine. Patient feels that they are slightly improved due to a slight reduction in symptoms.     Since last visit the patient feels that they are 20 percent  improved from last visit.       Objective:  The following was observed:    P: pain elicited on palpation, C6, T3, L5    A: static palpation demonstrates intersegmental asymmetry , C2, C6, T5, T7, L5, SIJ    R: motion palpation notes restricted motion-C6, T5, L5    T: muscle spasm at level(s): Lumbar erector spine, Sub-occipital, T-spine paraspinal and Traps Bilaterally      Assessment:    Segmental spinal dysfunction/restrictions found at:  C4   C6   T5  T7  L5  Sacrum    Diagnoses:    No diagnosis found.    Patient's condition:  Patient had restrictions pre-manipulation    Treatment effectiveness:  Post manipulation there is better intersegmental movement and Patient claims to feel looser post manipulation      Procedures:  CMT:  42453 Chiropractic manipulative treatment 3-4 regions performed   Cervical: Diversified, C3 , C6, Supine -gentle, no cavitation  Thoracic: Diversified, T3, T5, T10, Prone-gentle no cavitation  Lumbar: Drop Table, L5, Prone. Gentle distraction L5-SIJ-x5    Modalities:  none    Therapeutic  procedures:  None      Prognosis: fair    Progress towards Goals: Patient is making slight progress towards the goal of STANDING: the patient specific goal is to attain the pre-injury status of 1/2 hours comfortably   Able to walk 20 minutes without back or leg pain.  .     Response to Treatment:   Patient tolerated the treatment well today.      Recommendations:    Instructions:ice 20 minutes every other hour as needed    Follow-up:  Return to care in 1-2 weeks.

## 2017-12-05 NOTE — TELEPHONE ENCOUNTER
Requested Prescriptions   Refused Prescriptions Disp Refills     tamsulosin (FLOMAX) 0.4 MG capsule [Pharmacy Med Name: TAMSULOSIN 0.4MG CAPSULES] 180 capsule 2     Sig: TAKE 1 CAPSULE BY MOUTH EVERY DAY FOR WEEK 1 THEN IF NOT HELPING TAKE 2 CAPSULES BY MOUTH EVERY DAY THERAFTER    Alpha Blockers Passed    12/2/2017  8:11 PM       Passed - BP is less than 140/90    BP Readings from Last 3 Encounters:   12/01/17 100/62   10/27/17 92/60   10/03/17 108/70            Passed - Recent or future visit with authorizing provider's specialty    Patient had office visit in the last year or has a visit in the next 30 days with authorizing provider.  See chart review.          Passed - Patient does not have Tadalafil, Vardenafil, or Sildenafil on their medication list       Passed - Patient is 18 years of age or older        New medication prescribed this month, not increasing qty until we are sure this works for him.  Sent back as declined.  Jacqueline Jimenez, RN  Triage Nurse

## 2017-12-06 NOTE — TELEPHONE ENCOUNTER
Spoke with patient. Patient had injection 1/4/2017 by Dr Roca and had great relief. About 4-5 months relief. Patient c/o of low back and right leg pain today. Order placed with [facility] for Dr Roca to do injection. Informed patient he should be receiving a call from them to schedule. Advised patient to contact our clinic if pain persists 2 weeks post injection. patient verbalized understanding.

## 2017-12-07 ENCOUNTER — TELEPHONE (OUTPATIENT)
Dept: PALLIATIVE MEDICINE | Facility: CLINIC | Age: 72
End: 2017-12-07

## 2017-12-07 NOTE — TELEPHONE ENCOUNTER
Pre-screening Questions for Radiology Injections:    Injection to be done at which interventional clinic site? [facility]    Procedure ordered by JOSUE FELDMAN    Procedure ordered? Lumbar Epidural Steroid Injection    What insurance would patient like us to bill for this procedure? MEDICARE      Worker's comp or MVA (motor vehicle accident) -Any injection DO NOT SCHEDULE and route to Rubi Swenson.      [employer] insurance - For SI joint injections, DO NOT SCHEDULE and route Mercedes Foster.      [employer]- MBB's must be scheduled at LEAST two weeks apart      [employer] - Any injection besides hip/shoulder/knee joint DO NOT SCHEDULE and route to Mercedes Foster. She will obtain PA and call pt back to schedule procedure or notify pt of denial.       [employer]-PA REQUIRED FOR NON-WALTER OR Joint injections    Any chance of pregnancy? Not Applicable   If YES, do NOT schedule and route to RN pool    Is an  needed? No     Patient has a drive home? (mandatory) YES:     Is patient taking any blood thinners (plavix, coumadin, jantoven, warfarin, heparin, pradaxa or dabigatran )? No   If hold needed, do NOT schedule, route to RN pool     Is patient taking any aspirin products? No     If more than 325mg/day do NOT schedule; route to RN pool     For CERVICAL procedures, hold all aspirin products for 6 days.      Does the patient have a bleeding or clotting disorder? No     If YES, okay to schedule AND route to RN nurse pool    **For any patients with platelet count <100, must be forwarded to provider**    Is patient diabetic?  Yes  If YES, have them bring their glucometer.    Does patient have an active infection or treated for one within the past week? No     Is patient currently taking any antibiotics?  No     For patients on chronic, preventative, or prophylactic antibiotics, procedures may be scheduled.     For patients on antibiotics for active or recent infection:    Misty Millard,  Estiven Roca-antibiotic course must have been completed for 4 days    Dr. Silva-antibiotic course must have been completed for 7 days    Is patient currently taking any steroid medications? (i.e. Prednisone, Medrol)  No     For patients on steroid medications:    Misty Millard Burton, Snitzer-steroid course must have been completed for 4 days    -steroid course must have been completed for 7 days    Reviewed with patient:  If you are started on any steroids or antibiotics between now and your appointment, you must contact us because it may affect our ability to perform your procedure.  Yes    Is patient actively being treated for cancer or immunocompromised? No  If YES, do NOT schedule and route to RN pool     Are you able to get on and off an exam table with minimal or no assistance? Yes  If NO, do NOT schedule and route to RN pool    Are you able to roll over and lay on your stomach with minimal or no assistance? Yes  If NO, do NOT schedule and route to RN pool     Any allergies to contrast dye, iodine, shellfish, or numbing and steroid medications? No  If YES, route to RN pool AND add allergy information to appointment notes    Allergies: Seasonal allergies      Has the patient had a flu shot or any other vaccinations within 7 days before or after the procedure.  No     Does patient have an MRI/CT?  YES:   (SI joint, hip injections, lumbar sympathetic blocks, and stellate ganglion blocks do not require an MRI)    Was the MRI done w/in the last 3 years?  Yes    Was MRI done at [facility]? Yes      If not, where was it done? N/A       If MRI was not done at [facility], [facility] do NOT schedule and route to nursing.  If pt has an imaging disc, the injection may be scheduled but pt has to bring disc to appt. If they show up w/out disc the injection cannot be done    Reminders (please tell patient if applicable):       Instructed pt to arrive 30 minutes early for IV start if this is for a  cervical procedure, ALL sympathetic (stellate ganglion, hypogastric, or lumbar sympathetic block) and all sedation procedures (RFA, spinal cord stimulation trials).  Not Applicable   -IVs are not routinely placed for Dr. Roca cervical cases   -Dr. Jean-Baptiste: IVs for cervical ESIs and cervical TBDs (not CMBBs/facet inj)      If NPO for sedation, informed patient that it is okay to take medications with sips of water (except if they are to hold blood thinners).  Not Applicable   *DO take blood pressure medication if it is prescribed*      If this is for a cervical WALTER, informed patient that aspirin needs to be held for 6 days.   Not Applicable      For all patients not having spinal cord stimulator (SCS) trials or radiofrequency ablations (RFAs), informed patient:    IV sedation is not provided for this procedure.  If you feel that an oral anti-anxiety medication is needed, you can discuss this further with your referring provider or primary care provider.  The Pain Clinic provider will discuss specifics of what the procedure includes at your appointment.  Most procedures last 10-20 minutes.  We use numbing medications to help with any discomfort during the procedure.  Not Applicable      Do not schedule procedures requiring IV placement in the first appointment of the day or first appointment after lunch.       For patients 85 or older we recommend having an adult stay w/ them for the remainder of the day.       Does the patient have any questions?    [facility]

## 2017-12-26 NOTE — PROGRESS NOTES
[facility] - Procedure Note    Date of Service: 12/27/2017    Procedure performed: Right L4-5 transforaminal epidural steroid injection with fluoroscopic guidance  Diagnosis: Lumbar spondylosis; Lumbar radiculitis/radiculopathy  : Caitlin Roca MD & Freya Heredia MD (pain fellow)   Anesthesia: none    Indications: Paco Eckert is a 72 year old male who is seen at the request of Dolores Sweet CNP for lumbar transforaminal epidural steroid injection. The patient describes bilateral lower back pain (R>L), that radiates anteriorly on the right into his groin, anterior thigh, and knee. The patient has been exhibiting symptoms consistent with lumbar intraspinal inflammation and radiculopathy. Symptoms have been persistent, disabling, and intermittently severe. The patient reports minimal improvement with conservative treatment, including PT, medications, and injections.    This is a repeat injection.  The patient got about 6 months pain relief from Right L4-5 TRANSFORAMINAL EPIDURAL STEROID INJECTION done on 1/04/2017    Lumbar MRI was done on 11/15/2016 at [facility] in  showed:           Allergies:      Allergies   Allergen Reactions     Seasonal Allergies         Vitals:  /80  Pulse 114  SpO2 100%    Review of Systems: The patient denies recent fever, chills, illness, use of antibiotics or anticoagulants. All other 10-point review of systems negative.     Procedure: The procedure and risks were explained, and informed written consent was obtained from the patient. Risks include but are not limited to: infection, bleeding, increased pain, and damage to soft tissue, nerve, muscle, and vasculature structures. After getting informed consent, patient was brought into the procedure suite and was placed in a prone position on the procedure table. A Pause for the Cause was performed. Patient was prepped and draped in sterile fashion.     After identifying the right L4-5  neuroforamen, the C-arm was rotated to a right lateral oblique angle.  A total of 4.5 mL of Lidocaine 1% was used to anesthetize the skin and the needle track at a skin entry site coaxial with the fluoroscopy beam, and overriding the superior aspect of the neuroforamen.  A 22 gauge 5 inch spinal needle was advanced under intermittent fluoroscopy until it entered the foramen superiorly.    The position was then inspected from anteroposterior and lateral views, and the needle adjusted appropriately.  After negative aspiration, a total of 1 mL of Omnipaque-300 was injected using static and continuous fluoroscopy confirming appropriate position, with spread along the nerve root sheath and into the epidural space, with no intravascular or intrathecal uptake. 9 mL of Omnipaque-300 was wasted.    2mL of 1% lidocaine with 20 mg of dexamethasone was injected.  The needle was removed. Hemostasis was achieved, the area was cleaned, and bandaids were placed when appropriate. Images were saved to PACS.    The patient tolerated the procedure well, and was taken to the recovery room, and there was no evidence of procedural complications. No new sensory or motor deficits were noted following the procedure. The patient was stable and able to ambulate on discharge home. Post-procedure instructions were provided.     Pre-procedure pain score: 2/10 in the back, 2/10 in the leg  Post-procedure pain score: 0/10 in the back, 0/10 in the leg    Assessment/Plan: Paco Eckert is a 72 year old male s/p Right L4-5 transforaminal epidural steroid injection today for lumbar spondylosis, radiculitis/radiculopathy.     1. Following today's procedure, the patient was advised to contact the [facility] for any of the following:   Fever, chills, or night sweats   New onset of pain, numbness, or weakness   Any questions/concerns regarding the procedure  If unable to contact the Pain Center, the patient was instructed to go to  a local Emergency Room for any complications.   2. The patient will receive a follow-up call in 1 week.  3. The patient should follow-up with the referring provider in 2 weeks for post-procedure evaluation.      Caitlin Roca MD   [facility]

## 2017-12-27 ENCOUNTER — RADIANT APPOINTMENT (OUTPATIENT)
Dept: GENERAL RADIOLOGY | Facility: CLINIC | Age: 72
End: 2017-12-27
Attending: ANESTHESIOLOGY
Payer: MEDICARE

## 2017-12-27 ENCOUNTER — RADIOLOGY INJECTION OFFICE VISIT (OUTPATIENT)
Dept: PALLIATIVE MEDICINE | Facility: CLINIC | Age: 72
End: 2017-12-27
Payer: MEDICARE

## 2017-12-27 VITALS — DIASTOLIC BLOOD PRESSURE: 80 MMHG | OXYGEN SATURATION: 100 % | HEART RATE: 114 BPM | SYSTOLIC BLOOD PRESSURE: 122 MMHG

## 2017-12-27 DIAGNOSIS — M54.16 LUMBAR RADICULAR PAIN: ICD-10-CM

## 2017-12-27 DIAGNOSIS — M54.16 LUMBAR RADICULOPATHY: Primary | ICD-10-CM

## 2017-12-27 PROCEDURE — 64483 NJX AA&/STRD TFRM EPI L/S 1: CPT | Mod: RT | Performed by: ANESTHESIOLOGY

## 2017-12-27 NOTE — NURSING NOTE
Discharge Information    IV Discontiued Time:  NA    Amount of Fluid Infused:  NA    Discharge Criteria = When patient returns to baseline or as per MD order    Consciousness:  Pt is fully awake    Circulation:  BP +/- 20% of pre-procedure level    Respiration:  Patient is able to breathe deeply    O2 Sat:  Patient is able to maintain O2 Sat >92% on room air    Activity:  Moves 4 extremities on command    Ambulation:  Patient is able to stand and walk or stand and pivot into wheelchair    Dressing:  Clean/dry or No Dressing    Notes:   Discharge instructions and AVS given to patient    Patient meets criteria for discharge?  YES    Admitted to PCU?  No    Responsible adult present to accompany patient home?  Yes    Signature/Title:    Allyssa Gaxiola RN Care Coordinator  [facility]

## 2017-12-27 NOTE — MR AVS SNAPSHOT
After Visit Summary   12/27/2017    Paco Eckert    MRN: [id number]           Patient Information     Date Of Birth          1945        Visit Information        Provider Department      12/27/2017 8:45 AM Caitlin Roca MD [facility]        Care Instructions    [facility] Procedure Discharge Instructions    Today you saw:     Dr. Caitlin Roca        Your procedure:  Epidural steroid injection      Medications used:  Lidocaine (anesthetic)  Dexamethasone (steroid)   Omnipaque (contrast)                Be cautious when walking as numbness and/or weakness in the legs may occur up to 6-8 hours after the procedure due to effect of the local anesthetic    Do not drive for 6 hours. The effect of the local anesthetic could slow your reflexes.     Avoid strenuous activity for the first 24 hours. You may resume your regular activities after that.     You may shower, however avoid swimming, tub baths or hot tubs for 24 hours following your procedure    You may have a mild to moderate increase in pain for several days following the injection.      You may use ice packs for 10-15 minutes, 3 to 4 times a day at the injection site for comfort    Do not use heat to painful areas for 6 to 8 hours. This will give the local anesthetic time to wear off and prevent you from accidentally burning your skin.    You may use anti-inflammatory medications (such as Ibuprofen/Advil or Aleve) or Tylenol for pain control if necessary    It may take up to 14 days for the steroid medication to start working although you may feel the effect as early as a few days after the procedure.     Follow up with your referring provider in 2 - 3 weeks.       If you experience any of the following, call the pain center  line during work hours at [phone] or on-call physician after hours at [phone]:  -Fever over 100 degree F  -Swelling, bleeding, redness, drainage, warmth at the injection  site  -Progressive weakness or numbness in your legs or arms  -Loss of bowel or bladder function  -Unusual headache that is not relieved by Tylenol  -Unusual new onset of pain that is not improving    Phone #s:  Nurse line for general questions: [phone]          Follow-ups after your visit        Who to contact     If you have questions or need follow up information about today's clinic visit or your schedule please contact [facility] directly at [phone].  Normal or non-critical lab and imaging results will be communicated to you by [employer], letter or phone within 4 business days after the clinic has received the results. If you do not hear from us within 7 days, please contact the clinic through [employer] or phone. If you have a critical or abnormal lab result, we will notify you by phone as soon as possible.  Submit refill requests through [employer] or call your pharmacy and they will forward the refill request to us. Please allow 3 business days for your refill to be completed.          Additional Information About Your Visit        [employer] Information     [employer] gives you secure access to your electronic health record. If you see a primary care provider, you can also send messages to your care team and make appointments. If you have questions, please call your primary care clinic.  If you do not have a primary care provider, please call [phone] and they will assist you.        Care EveryWhere ID     This is your Care EveryWhere ID. This could be used by other organizations to access your [facility] medical records  [id number]        Your Vitals Were     Pulse Pulse Oximetry                92 98%           Blood Pressure from Last 3 Encounters:   12/27/17 129/75   12/01/17 100/62   10/27/17 92/60    Weight from Last 3 Encounters:   12/01/17 86.2 kg (190 lb)   10/27/17 86.5 kg (190 lb 12.8 oz)   10/03/17 87.1 kg (192 lb)              Today, you had the following     No orders found for  display       Primary Care Provider Office Phone # Fax #    Emeka Hale [phone] [address] [place] [address]        Equal Access to Services     KAYLEEOLIVIA MARTEAHN : Celine sarah gill alejandra Lozoya, wazenaidada luqadaha, qaybta kachloeda alice, lissett kincaidkarol lisa. So [facility] [phone].    ATENCIÓN: Si habla español, tiene a squires disposición servicios gratuitos de asistencia lingüística. Llame al [phone].    We comply with applicable federal civil rights laws and [place] laws. We do not discriminate on the basis of race, color, national origin, age, disability, sex, sexual orientation, or gender identity.            Thank you!     Thank you for choosing [facility]  for your care. Our goal is always to provide you with excellent care. Hearing back from our patients is one way we can continue to improve our services. Please take a few minutes to complete the written survey that you may receive in the mail after your visit with us. Thank you!             Your Updated Medication List - Protect others around you: Learn how to safely use, store and throw away your medicines at www.disposemymeds.org.          This list is accurate as of: 12/27/17  8:46 AM.  Always use your most recent med list.                   Brand Name Dispense Instructions for use Diagnosis    clobetasol propionate 0.05 % Foam           clotrimazole 1 % cream    LOTRIMIN    15 g    Apply topically 2 times daily    Candidal dermatitis       cyclobenzaprine 10 MG tablet    FLEXERIL    30 tablet    Take 1 tablet (10 mg) by mouth 3 times daily as needed for muscle spasms    Strain of trapezius muscle, unspecified laterality, initial encounter       HYDROcodone-acetaminophen 5-325 MG per tablet    NORCO    20 tablet    Take 1-2 tablets by mouth every 4 hours as needed for moderate to severe pain maximum 4 tablet(s) per day    Strain of trapezius muscle, unspecified laterality,  initial encounter       ibuprofen 600 MG tablet    IBU    60 tablet    Take 1 tablet by mouth every 6 hours as needed for pain.    Left-sided chest wall pain       loratadine-pseudoePHEDrine  MG per 24 hr tablet    CLARITIN-D 24-hour     Take 1 tablet by mouth daily        simvastatin 40 MG tablet    ZOCOR    90 tablet    Take 1 tablet (40 mg) by mouth At Bedtime    Hyperlipidemia LDL goal <130       tamsulosin 0.4 MG capsule    FLOMAX    60 capsule    Take 1 capsule (0.4 mg) by mouth daily For week 1; if not helping, may take 2 tabs thereafter.    Frequency of urination and polyuria       terazosin 1 MG capsule    HYTRIN    70 capsule    Start with 1 tab (1 mg) daily for 1 week, 2 tabs (2 mg) daily for 1 week, 3 tabs (3 mg) daily for 1 week, then 4 tabs (4 mg) daily    Urinary frequency

## 2017-12-27 NOTE — PATIENT INSTRUCTIONS
[facility] Procedure Discharge Instructions    Today you saw:     Dr. Caitlin Roca        Your procedure:  Epidural steroid injection      Medications used:  Lidocaine (anesthetic)  Dexamethasone (steroid)   Omnipaque (contrast)                Be cautious when walking as numbness and/or weakness in the legs may occur up to 6-8 hours after the procedure due to effect of the local anesthetic    Do not drive for 6 hours. The effect of the local anesthetic could slow your reflexes.     Avoid strenuous activity for the first 24 hours. You may resume your regular activities after that.     You may shower, however avoid swimming, tub baths or hot tubs for 24 hours following your procedure    You may have a mild to moderate increase in pain for several days following the injection.      You may use ice packs for 10-15 minutes, 3 to 4 times a day at the injection site for comfort    Do not use heat to painful areas for 6 to 8 hours. This will give the local anesthetic time to wear off and prevent you from accidentally burning your skin.    You may use anti-inflammatory medications (such as Ibuprofen/Advil or Aleve) or Tylenol for pain control if necessary    It may take up to 14 days for the steroid medication to start working although you may feel the effect as early as a few days after the procedure.     Follow up with your referring provider in 2 - 3 weeks.       If you experience any of the following, call the pain center  line during work hours at [phone] or on-call physician after hours at [phone]:  -Fever over 100 degree F  -Swelling, bleeding, redness, drainage, warmth at the injection site  -Progressive weakness or numbness in your legs or arms  -Loss of bowel or bladder function  -Unusual headache that is not relieved by Tylenol  -Unusual new onset of pain that is not improving    Phone #s:  Nurse line for general questions: [phone]

## 2017-12-28 ENCOUNTER — TELEPHONE (OUTPATIENT)
Dept: PEDIATRICS | Facility: CLINIC | Age: 72
End: 2017-12-28

## 2017-12-28 DIAGNOSIS — R35.0 FREQUENCY OF URINATION AND POLYURIA: Primary | ICD-10-CM

## 2017-12-28 DIAGNOSIS — R35.89 FREQUENCY OF URINATION AND POLYURIA: Primary | ICD-10-CM

## 2017-12-28 RX ORDER — TOLTERODINE 2 MG/1
2 CAPSULE, EXTENDED RELEASE ORAL DAILY
Qty: 30 CAPSULE | Refills: 1 | Status: SHIPPED | OUTPATIENT
Start: 2017-12-28 | End: 2018-03-22

## 2017-12-28 NOTE — TELEPHONE ENCOUNTER
Patient called requesting a new Rx for Ditropan or Detrol based discussion in previous office visit.  Patient is experiencing urine leakage and would like to try one of these medications.    Unable to pend the medication, unsure of dosage and duration.    Kassie Sun RN.  Nurse Triage

## 2017-12-28 NOTE — TELEPHONE ENCOUNTER
Faxed to [facility].    Needs follow up appointment in 1-2 months.     Emeka Hale MD  Internal Medicine and Pediatrics

## 2017-12-29 NOTE — TELEPHONE ENCOUNTER
Spoke with Paco - he will call when he returns from vacation to schedule a follow up with Dr. Alexia [facility] MA   December 29, 2017,  11:51 AM

## 2018-03-22 ENCOUNTER — OFFICE VISIT (OUTPATIENT)
Dept: PEDIATRICS | Facility: CLINIC | Age: 73
End: 2018-03-22
Payer: MEDICARE

## 2018-03-22 VITALS
SYSTOLIC BLOOD PRESSURE: 102 MMHG | DIASTOLIC BLOOD PRESSURE: 66 MMHG | OXYGEN SATURATION: 99 % | BODY MASS INDEX: 31.34 KG/M2 | HEART RATE: 77 BPM | HEIGHT: 66 IN | WEIGHT: 195 LBS | TEMPERATURE: 97.4 F

## 2018-03-22 DIAGNOSIS — R35.89 FREQUENCY OF URINATION AND POLYURIA: ICD-10-CM

## 2018-03-22 DIAGNOSIS — R35.0 FREQUENCY OF URINATION AND POLYURIA: ICD-10-CM

## 2018-03-22 DIAGNOSIS — Z01.818 PREOP GENERAL PHYSICAL EXAM: Primary | ICD-10-CM

## 2018-03-22 DIAGNOSIS — I10 ESSENTIAL HYPERTENSION WITH GOAL BLOOD PRESSURE LESS THAN 140/90: ICD-10-CM

## 2018-03-22 DIAGNOSIS — H26.9 CATARACT OF BOTH EYES, UNSPECIFIED CATARACT TYPE: ICD-10-CM

## 2018-03-22 PROCEDURE — 99214 OFFICE O/P EST MOD 30 MIN: CPT | Performed by: INTERNAL MEDICINE

## 2018-03-22 RX ORDER — LISINOPRIL 10 MG/1
10 TABLET ORAL DAILY
Qty: 90 TABLET | Refills: 3 | COMMUNITY
Start: 2018-03-22 | End: 2018-10-26

## 2018-03-22 NOTE — PROGRESS NOTES
[facility]  [address]  [address]  [place] [address] [phone]  Dept: [phone]    PRE-OP EVALUATION:  Today's date: 3/22/2018    Paco Eckert (: 1945) presents for pre-operative evaluation assessment as requested by Dr. Ni Moore.  He requires evaluation and anesthesia risk assessment prior to undergoing surgery/procedure for treatment of cataract, left eye.    Fax number for surgical facility: [phone]  Primary Physician: Emeka Hale  Type of Anesthesia Anticipated: TBD    Patient has a Health Care Directive or Living Will:  YES, will bring in a copy to us.     Preop Questions 3/19/2018   What are you having done? 3 28   Date of Surgery/Procedure: 3 28   Facility or Hospital where procedure/surgery will be performed: [facility]   1.  Do you have a history of Heart attack, stroke, stent, coronary bypass surgery, or other heart surgery? No   2.  Do you ever have any pain or discomfort in your chest? No   3.  Do you have a history of  Heart Failure? No   4.   Are you troubled by shortness of breath when:  walking on a level surface, or up a slight hill, or at night? YES - mild   5.  Do you currently have a cold, bronchitis or other respiratory infection? No   6.  Do you have a cough, shortness of breath, or wheezing? No   7.  Do you sometimes get pains in the calves of your legs when you walk? No   8. Do you or anyone in your family have previous history of blood clots? YES -    9.  Do you or does anyone in your family have a serious bleeding problem such as prolonged bleeding following surgeries or cuts? No   10. Have you ever had problems with anemia or been told to take iron pills? No   11. Have you had any abnormal blood loss such as black, tarry or bloody stools? No   12. Have you ever had a blood transfusion? No   13. Have you or any of your relatives ever had problems with anesthesia? No   14. Do you have sleep apnea, excessive snoring or daytime  drowsiness? No   15. Do you have any prosthetic heart valves? NO   16. Do you have prosthetic joints? NO         HPI:     HPI related to upcoming procedure: bilateral cataracts.       See problem list for active medical problems.  Problems all longstanding and stable, except as noted/documented.  See ROS for pertinent symptoms related to these conditions.                                                                                                  .  HYPERTENSION - Patient has longstanding history of HTN , currently denies any symptoms referable to elevated blood pressure. Specifically denies chest pain, palpitations, dyspnea, orthopnea, PND or peripheral edema. Blood pressure readings have been in normal range. Current medication regimen is as listed below. Patient denies any side effects of medication.                                                                                                                                                                                          .  HYPERLIPIDEMIA - Patient has a long history of significant Hyperlipidemia requiring medication for treatment with recent good control. Patient reports no problems or side effects with the medication.                                                                                                                                                       .    MEDICAL HISTORY:     Patient Active Problem List    Diagnosis Date Noted     Cervical pain 10/04/2017     Priority: Medium     Rectal cancer (H) 10/10/2014     Priority: Medium     Colostomy         Hypertension goal BP (blood pressure) < 140/90 10/08/2013     Priority: Medium     Fatty liver 02/09/2011     Priority: Medium     Impaired fasting glucose 07/26/2010     Priority: Medium     HYPERLIPIDEMIA LDL GOAL <130 02/10/2010     Priority: Medium     Frequency of urination and polyuria 12/15/2009     Priority: Medium     Leukopenia 11/07/2008     Priority: Medium     WBC 3.6 in  11/08.       Osteoporosis 10/10/2006     Priority: Medium     likely due to anti-androgen therapy.  Problem list name updated by automated process. Provider to review       Malignant neoplasm of prostate (H)      Priority: Medium     Previously on casodex and Lupron until 2012       Gouty arthropathy      Priority: Medium     Problem list name updated by automated process. Provider to review and confirm  [employer] Update utility       Other psoriasis      Priority: Medium      Past Medical History:   Diagnosis Date     Fatty liver 2/9/2011     Gouty arthropathy      Hypercholesteremia 12/15/2009     Hypertension      Impaired fasting glucose 7/26/2010     Leukopenia 11/7/2008    WBC 3.6 in 11/08.     Malignant neoplasm of prostate (H) 2000    on casodex and Lupron     Osteoporosis, unspecified     assoc with Lupron     Other psoriasis      Past Surgical History:   Procedure Laterality Date     APPLY WOUND VAC N/A 10/21/2014    Procedure: APPLY WOUND VAC;  Surgeon: Mir Hernandez MD;  Location:  OR     COLONOSCOPY N/A 10/10/2014    Procedure: COMBINED COLONOSCOPY, SINGLE BIOPSY/POLYPECTOMY BY BIOPSY;  Surgeon: Mir Hernandez MD;  Location:  GI     COLOSTOMY N/A 10/21/2014    Procedure: COLOSTOMY;  Surgeon: Mir Hernandez MD;  Location:  OR     ORTHOPEDIC SURGERY Bilateral 2000 ?    rotater repair both     ORTHOPEDIC SURGERY Right     ankle      RESECTION ABDOMINAL PERINEAL N/A 10/21/2014    Procedure: RESECTION ABDOMINAL PERINEAL;  Surgeon: Mir Hernandez MD;  Location:  OR     Current Outpatient Prescriptions   Medication Sig Dispense Refill     tolterodine (DETROL LA) 2 MG 24 hr capsule Take 1 capsule (2 mg) by mouth daily 30 capsule 1     tamsulosin (FLOMAX) 0.4 MG capsule Take 1 capsule (0.4 mg) by mouth daily For week 1; if not helping, may take 2 tabs thereafter. 60 capsule 2     terazosin (HYTRIN) 1 MG capsule Start with 1 tab (1 mg) daily for 1 week, 2 tabs (2 mg) daily for 1 week, 3 tabs (3 mg)  daily for 1 week, then 4 tabs (4 mg) daily 70 capsule 0     loratadine-pseudoePHEDrine (CLARITIN-D 24-HOUR)  MG per 24 hr tablet Take 1 tablet by mouth daily       clotrimazole (LOTRIMIN) 1 % cream Apply topically 2 times daily 15 g 1     simvastatin (ZOCOR) 40 MG tablet Take 1 tablet (40 mg) by mouth At Bedtime 90 tablet 3     clobetasol propionate 0.05 % FOAM   1     ibuprofen (IBU) 600 MG tablet Take 1 tablet by mouth every 6 hours as needed for pain. 60 tablet 0     OTC products: None, except as noted above    Allergies   Allergen Reactions     Seasonal Allergies       Latex Allergy: NO    Social History   Substance Use Topics     Smoking status: Never Smoker     Smokeless tobacco: Never Used     Alcohol use 0.0 oz/week     0 Standard drinks or equivalent per week      Comment: 10-12 drinks per week     History   Drug Use No       REVIEW OF SYSTEMS:   CONSTITUTIONAL: NEGATIVE for fever, chills, change in weight  INTEGUMENTARY/SKIN: NEGATIVE for worrisome rashes, moles or lesions  EYES: NEGATIVE for vision changes or irritation  ENT/MOUTH: NEGATIVE for ear, mouth and throat problems  RESP: NEGATIVE for significant cough or SOB  BREAST: NEGATIVE for masses, tenderness or discharge  CV: NEGATIVE for chest pain, palpitations or peripheral edema  GI: NEGATIVE for nausea, abdominal pain, heartburn, or change in bowel habits  : NEGATIVE for frequency, dysuria, or hematuria  MUSCULOSKELETAL: NEGATIVE for significant arthralgias or myalgia  NEURO: NEGATIVE for weakness, dizziness or paresthesias  ENDOCRINE: NEGATIVE for temperature intolerance, skin/hair changes  HEME: NEGATIVE for bleeding problems  PSYCHIATRIC: NEGATIVE for changes in mood or affect    EXAM:   There were no vitals taken for this visit.    GENERAL APPEARANCE: healthy, alert and no distress     EYES: EOMI,  PERRL     HENT: ear canals and TM's normal and nose and mouth without ulcers or lesions     NECK: no adenopathy, no asymmetry, masses, or  scars and thyroid normal to palpation     RESP: lungs clear to auscultation - no rales, rhonchi or wheezes     CV: regular rates and rhythm, normal S1 S2, no S3 or S4 and no murmur, click or rub     ABDOMEN:  soft, nontender, no HSM or masses and bowel sounds normal     MS: extremities normal- no gross deformities noted, no evidence of inflammation in joints, FROM in all extremities.     SKIN: no suspicious lesions or rashes     NEURO: Normal strength and tone, sensory exam grossly normal, mentation intact and speech normal     PSYCH: mentation appears normal. and affect normal/bright     LYMPHATICS: No cervical adenopathy    DIAGNOSTICS:   No labs or EKG required for low risk surgery (cataract, skin procedure, breast biopsy, etc)    Recent Labs   Lab Test  11/08/16   0851 04/25/16   09/01/15   1132   11/17/14   1132   10/23/14   0720   10/21/14   2150   09/14/11   0754   HGB  12.9*   --    --    --    --   10.2*   < >  7.7*   < >  9.3*   < >   --    PLT  126*   --    --    --    --   173   < >  104*   < >  114*   < >   --    INR   --    --    --    --    --    --    --   1.25*   --   1.21*   --    --    NA  136   --    --   136   --    --    < >  137   < >   --    < >   --    POTASSIUM  4.6  4.6   < >  5.0   < >   --    < >  4.1   < >   --    < >   --    CR  0.93  0.82   < >  0.81   < >   --    < >  0.93   < >  0.91   < >   --    A1C   --    --    --    --    --    --    --    --    --    --    --   4.9    < > = values in this interval not displayed.        IMPRESSION:   Reason for surgery/procedure: Cataracts  Diagnosis/reason for consult: preoperative evaluation     The proposed surgical procedure is considered LOW risk.    REVISED CARDIAC RISK INDEX  The patient has the following serious cardiovascular risks for perioperative complications such as (MI, PE, VFib and 3  AV Block):  No serious cardiac risks  INTERPRETATION: 0 risks: Class I (very low risk - 0.4% complication rate)    The patient has the following  additional risks for perioperative complications:  No identified additional risks      ICD-10-CM    1. Preop general physical exam Z01.818        RECOMMENDATIONS:     (Z01.818) Preop general physical exam  (primary encounter diagnosis)  Comment:   Plan: Advised to stop all aspirin products and nonsteroidals (like ibuprofen or naproxen) for 10 days prior to procedure.  Advised follow surgical center's instructions re: arrival time and when to stop eating.     (I10) Essential hypertension with goal blood pressure less than 140/90  Comment:   Plan: lisinopril (PRINIVIL/ZESTRIL) 10 MG tablet        At goal.      (H26.9) Cataract of both eyes, unspecified cataract type  Comment:   Plan: Surgical and post-op care per primary surgical service. .     (R35.0,  R35.8) Frequency of urination and polyuria  Comment:   Plan: Does not want additional meds.  Has failed alpha blocker and testosterone blockers.        --Patient is to take all scheduled medications on the day of surgery EXCEPT for modifications listed below.    APPROVAL GIVEN to proceed with proposed procedure, without further diagnostic evaluation       Signed Electronically by: Emeka Hale MD    Copy of this evaluation report is provided to requesting physician.    [facility] Preop Guidelines

## 2018-03-22 NOTE — PATIENT INSTRUCTIONS
Before Your Surgery      Call your surgeon if there is any change in your health. This includes signs of a cold or flu (such as a sore throat, runny nose, cough, rash or fever).    Do not smoke, drink alcohol or take over the counter medicine (unless your surgeon or primary care doctor tells you to) for the 24 hours before and after surgery.    If you take prescribed drugs: Follow your doctor s orders about which medicines to take and which to stop until after surgery.    Continue your lisinopril and simvastatin.      Eating and drinking prior to surgery: follow the instructions from your surgeon    Take a shower or bath the night before surgery. Use the soap your surgeon gave you to gently clean your skin. If you do not have soap from your surgeon, use your regular soap. Do not shave or scrub the surgery site.  Wear clean pajamas and have clean sheets on your bed.

## 2018-03-22 NOTE — MR AVS SNAPSHOT
After Visit Summary   3/22/2018    Paco Eckert    MRN: [id number]           Patient Information     Date Of Birth          1945        Visit Information        Provider Department      3/22/2018 10:40 AM Emeka Hale MD [facility]        Today's Diagnoses     Preop general physical exam    -  1    Essential hypertension with goal blood pressure less than 140/90        Cataract of both eyes, unspecified cataract type        Frequency of urination and polyuria          Care Instructions      Before Your Surgery      Call your surgeon if there is any change in your health. This includes signs of a cold or flu (such as a sore throat, runny nose, cough, rash or fever).    Do not smoke, drink alcohol or take over the counter medicine (unless your surgeon or primary care doctor tells you to) for the 24 hours before and after surgery.    If you take prescribed drugs: Follow your doctor s orders about which medicines to take and which to stop until after surgery.    Continue your lisinopril and simvastatin.      Eating and drinking prior to surgery: follow the instructions from your surgeon    Take a shower or bath the night before surgery. Use the soap your surgeon gave you to gently clean your skin. If you do not have soap from your surgeon, use your regular soap. Do not shave or scrub the surgery site.  Wear clean pajamas and have clean sheets on your bed.           Follow-ups after your visit        Follow-up notes from your care team     Return in about 6 months (around 9/22/2018) for Physical Exam.      Who to contact     If you have questions or need follow up information about today's clinic visit or your schedule please contact [facility] directly at [phone].  Normal or non-critical lab and imaging results will be communicated to you by MyChart, letter or phone within 4 business days after the clinic has received the results. If you do not hear from us within 7  "days, please contact the clinic through [facility] or phone. If you have a critical or abnormal lab result, we will notify you by phone as soon as possible.  Submit refill requests through [employer] or call your pharmacy and they will forward the refill request to us. Please allow 3 business days for your refill to be completed.          Additional Information About Your Visit        [employer] Connect Information     [employer] gives you secure access to your electronic health record. If you see a primary care provider, you can also send messages to your care team and make appointments. If you have questions, please call your primary care clinic.  If you do not have a primary care provider, please call [phone] and they will assist you.        Care EveryWhere ID     This is your Care EveryWhere ID. This could be used by other organizations to access your [facility] medical records  [id number]        Your Vitals Were     Pulse Temperature Height Pulse Oximetry BMI (Body Mass Index)       77 97.4  F (36.3  C) (Oral) 5' 6\" (1.676 m) 99% 31.47 kg/m2        Blood Pressure from Last 3 Encounters:   03/22/18 102/66   12/27/17 122/80   12/01/17 100/62    Weight from Last 3 Encounters:   03/22/18 195 lb (88.5 kg)   12/01/17 190 lb (86.2 kg)   10/27/17 190 lb 12.8 oz (86.5 kg)              Today, you had the following     No orders found for display         Today's Medication Changes          These changes are accurate as of 3/22/18 11:04 AM.  If you have any questions, ask your nurse or doctor.               Stop taking these medicines if you haven't already. Please contact your care team if you have questions.     tamsulosin 0.4 MG capsule   Commonly known as:  FLOMAX   Stopped by:  Emeka Hale MD           terazosin 1 MG capsule   Commonly known as:  HYTRIN   Stopped by:  Emeka Hale MD           tolterodine 2 MG 24 hr capsule   Commonly known as:  DETROL LA   Stopped by:  Emeka Hale MD                    Primary Care Provider " Office Phone # Fax #    Emeka Hale [phone] [address] [place] [address]        Equal Access to Services     OLIVIA DEAN : Celine sarah gill alejandra Sowaylon, wazenaidada luqdulce, qaybta kaalmada alice, lissett trimble laellenkarol lisa. So [facility] [phone].    ATENCIÓN: Si habla español, tiene a squires disposición servicios gratuitos de asistencia lingüística. Llame al [phone].    We comply with applicable federal civil rights laws and [place] laws. We do not discriminate on the basis of race, color, national origin, age, disability, sex, sexual orientation, or gender identity.            Thank you!     Thank you for choosing [facility]  for your care. Our goal is always to provide you with excellent care. Hearing back from our patients is one way we can continue to improve our services. Please take a few minutes to complete the written survey that you may receive in the mail after your visit with us. Thank you!             Your Updated Medication List - Protect others around you: Learn how to safely use, store and throw away your medicines at www.disposemymeds.org.          This list is accurate as of 3/22/18 11:04 AM.  Always use your most recent med list.                   Brand Name Dispense Instructions for use Diagnosis    clobetasol propionate 0.05 % Foam           clotrimazole 1 % cream    LOTRIMIN    15 g    Apply topically 2 times daily    Candidal dermatitis       ibuprofen 600 MG tablet    IBU    60 tablet    Take 1 tablet by mouth every 6 hours as needed for pain.    Left-sided chest wall pain       lisinopril 10 MG tablet    PRINIVIL/ZESTRIL    90 tablet    Take 1 tablet (10 mg) by mouth daily    Essential hypertension with goal blood pressure less than 140/90       loratadine-pseudoePHEDrine  MG per 24 hr tablet    CLARITIN-D 24-hour     Take 1 tablet by mouth daily        simvastatin 40 MG tablet    ZOCOR    90 tablet    Take 1  tablet (40 mg) by mouth At Bedtime    Hyperlipidemia LDL goal <130

## 2018-03-23 ENCOUNTER — TRANSFERRED RECORDS (OUTPATIENT)
Dept: HEALTH INFORMATION MANAGEMENT | Facility: CLINIC | Age: 73
End: 2018-03-23

## 2018-10-18 DIAGNOSIS — I10 ESSENTIAL HYPERTENSION WITH GOAL BLOOD PRESSURE LESS THAN 140/90: ICD-10-CM

## 2018-10-18 NOTE — TELEPHONE ENCOUNTER
Not due for a refill.     lisinopril (PRINIVIL/ZESTRIL) 10 MG tablet was filled on 3/22/2018, qty 90 with 3 refills.

## 2018-10-19 NOTE — TELEPHONE ENCOUNTER
Routing refill request to provider for review/approval because:  Medication is reported/historical    Trina Paredes RN

## 2018-10-22 RX ORDER — LISINOPRIL 10 MG/1
TABLET ORAL
Qty: 90 TABLET | Refills: 3 | Status: SHIPPED | OUTPATIENT
Start: 2018-10-22 | End: 2019-10-22

## 2018-10-26 ENCOUNTER — OFFICE VISIT (OUTPATIENT)
Dept: PEDIATRICS | Facility: CLINIC | Age: 73
End: 2018-10-26
Payer: MEDICARE

## 2018-10-26 VITALS — DIASTOLIC BLOOD PRESSURE: 70 MMHG | WEIGHT: 204 LBS | SYSTOLIC BLOOD PRESSURE: 102 MMHG | BODY MASS INDEX: 32.93 KG/M2

## 2018-10-26 DIAGNOSIS — Z23 NEED FOR PROPHYLACTIC VACCINATION AND INOCULATION AGAINST INFLUENZA: ICD-10-CM

## 2018-10-26 DIAGNOSIS — N39.41 URGE INCONTINENCE: ICD-10-CM

## 2018-10-26 DIAGNOSIS — Z00.00 ENCOUNTER FOR ROUTINE ADULT HEALTH EXAMINATION WITHOUT ABNORMAL FINDINGS: Primary | ICD-10-CM

## 2018-10-26 DIAGNOSIS — E78.5 HYPERLIPIDEMIA LDL GOAL <130: ICD-10-CM

## 2018-10-26 DIAGNOSIS — Z12.5 SPECIAL SCREENING FOR MALIGNANT NEOPLASM OF PROSTATE: ICD-10-CM

## 2018-10-26 LAB
BASOPHILS # BLD AUTO: 0 10E9/L (ref 0–0.2)
BASOPHILS NFR BLD AUTO: 0.6 %
DIFFERENTIAL METHOD BLD: ABNORMAL
EOSINOPHIL # BLD AUTO: 0.2 10E9/L (ref 0–0.7)
EOSINOPHIL NFR BLD AUTO: 2.1 %
ERYTHROCYTE [DISTWIDTH] IN BLOOD BY AUTOMATED COUNT: 12.2 % (ref 10–15)
HCT VFR BLD AUTO: 37.6 % (ref 40–53)
HGB BLD-MCNC: 13.2 G/DL (ref 13.3–17.7)
LYMPHOCYTES # BLD AUTO: 1.2 10E9/L (ref 0.8–5.3)
LYMPHOCYTES NFR BLD AUTO: 17.7 %
MCH RBC QN AUTO: 32.6 PG (ref 26.5–33)
MCHC RBC AUTO-ENTMCNC: 35.1 G/DL (ref 31.5–36.5)
MCV RBC AUTO: 93 FL (ref 78–100)
MONOCYTES # BLD AUTO: 0.7 10E9/L (ref 0–1.3)
MONOCYTES NFR BLD AUTO: 9.3 %
NEUTROPHILS # BLD AUTO: 4.9 10E9/L (ref 1.6–8.3)
NEUTROPHILS NFR BLD AUTO: 70.3 %
PLATELET # BLD AUTO: 167 10E9/L (ref 150–450)
RBC # BLD AUTO: 4.05 10E12/L (ref 4.4–5.9)
WBC # BLD AUTO: 7 10E9/L (ref 4–11)

## 2018-10-26 PROCEDURE — G0103 PSA SCREENING: HCPCS | Performed by: INTERNAL MEDICINE

## 2018-10-26 PROCEDURE — 90662 IIV NO PRSV INCREASED AG IM: CPT | Performed by: INTERNAL MEDICINE

## 2018-10-26 PROCEDURE — 80061 LIPID PANEL: CPT | Performed by: INTERNAL MEDICINE

## 2018-10-26 PROCEDURE — 85025 COMPLETE CBC W/AUTO DIFF WBC: CPT | Performed by: INTERNAL MEDICINE

## 2018-10-26 PROCEDURE — G0439 PPPS, SUBSEQ VISIT: HCPCS | Performed by: INTERNAL MEDICINE

## 2018-10-26 PROCEDURE — 80053 COMPREHEN METABOLIC PANEL: CPT | Performed by: INTERNAL MEDICINE

## 2018-10-26 PROCEDURE — 36415 COLL VENOUS BLD VENIPUNCTURE: CPT | Performed by: INTERNAL MEDICINE

## 2018-10-26 PROCEDURE — G0008 ADMIN INFLUENZA VIRUS VAC: HCPCS | Performed by: INTERNAL MEDICINE

## 2018-10-26 RX ORDER — OXYBUTYNIN CHLORIDE 5 MG/1
TABLET ORAL
Qty: 90 TABLET | Refills: 1 | OUTPATIENT
Start: 2018-10-26

## 2018-10-26 RX ORDER — OXYBUTYNIN CHLORIDE 5 MG/1
5 TABLET ORAL AT BEDTIME
Qty: 30 TABLET | Refills: 1 | Status: SHIPPED | OUTPATIENT
Start: 2018-10-26 | End: 2019-11-14

## 2018-10-26 RX ORDER — SIMVASTATIN 40 MG
40 TABLET ORAL AT BEDTIME
Qty: 90 TABLET | Refills: 3 | Status: SHIPPED | OUTPATIENT
Start: 2018-10-26 | End: 2019-11-14

## 2018-10-26 ASSESSMENT — ANXIETY QUESTIONNAIRES
GAD7 TOTAL SCORE: 0
7. FEELING AFRAID AS IF SOMETHING AWFUL MIGHT HAPPEN: NOT AT ALL
6. BECOMING EASILY ANNOYED OR IRRITABLE: NOT AT ALL
3. WORRYING TOO MUCH ABOUT DIFFERENT THINGS: NOT AT ALL
2. NOT BEING ABLE TO STOP OR CONTROL WORRYING: NOT AT ALL
5. BEING SO RESTLESS THAT IT IS HARD TO SIT STILL: NOT AT ALL
1. FEELING NERVOUS, ANXIOUS, OR ON EDGE: NOT AT ALL
IF YOU CHECKED OFF ANY PROBLEMS ON THIS QUESTIONNAIRE, HOW DIFFICULT HAVE THESE PROBLEMS MADE IT FOR YOU TO DO YOUR WORK, TAKE CARE OF THINGS AT HOME, OR GET ALONG WITH OTHER PEOPLE: NOT DIFFICULT AT ALL

## 2018-10-26 ASSESSMENT — ENCOUNTER SYMPTOMS
CONSTIPATION: 0
HEADACHES: 0
HEMATURIA: 0
NAUSEA: 0
SHORTNESS OF BREATH: 1
PALPITATIONS: 0
ABDOMINAL PAIN: 0
NERVOUS/ANXIOUS: 0
HEARTBURN: 0
EYE PAIN: 0
COUGH: 0
WEAKNESS: 0
DIZZINESS: 0
JOINT SWELLING: 0
ARTHRALGIAS: 1
FREQUENCY: 1
FEVER: 0
DIARRHEA: 0
CHILLS: 0
PARESTHESIAS: 0
HEMATOCHEZIA: 0
DYSURIA: 0
SORE THROAT: 0

## 2018-10-26 ASSESSMENT — ACTIVITIES OF DAILY LIVING (ADL)
I_NEED_ASSISTANCE_FOR_THE_FOLLOWING_DAILY_ACTIVITIES:: NO ASSISTANCE IS NEEDED
CURRENT_FUNCTION: NO ASSISTANCE NEEDED

## 2018-10-26 ASSESSMENT — PATIENT HEALTH QUESTIONNAIRE - PHQ9
SUM OF ALL RESPONSES TO PHQ QUESTIONS 1-9: 0
5. POOR APPETITE OR OVEREATING: NOT AT ALL

## 2018-10-26 NOTE — MR AVS SNAPSHOT
"              After Visit Summary   10/26/2018    Paco Eckert    MRN: [id number]           Patient Information     Date Of Birth          1945        Visit Information        Provider Department      10/26/2018 9:20 AM Emeka Hale MD [facility]        Today's Diagnoses     Need for prophylactic vaccination and inoculation against influenza    -  1    Hyperlipidemia LDL goal <130        Encounter for routine adult health examination without abnormal findings        Special screening for malignant neoplasm of prostate        Urge incontinence          Care Instructions      Flu shot today.    Get your shingles vaccine (\"Shingrix\") at our pharmacy downstairs (or at another pharmacy), sometime this year--even if you've already received the old \"Zostavax\" shingles vaccine.  The \"Shingrix\" has better immunity and lasts longer, and is cheaper if you get it directly at a pharmacy.  You should not need an appointment.     You will need a second Shingrix anywhere from 2-6 months later.    Lab work downstairs today.  Directions:  As you walk through the first floor, you'll see (on the right) first the pharmacy, then some bathrooms, then the \"Lab and Imaging\" area. Give them your name at the window there and wait for them to call you.     Trial of Ditropan, nightly before bed, and follow up in 1 month.    COlonoscopy next month.    Emeka Hale MD  Internal Medicine and Pediatrics     Preventive Health Recommendations:       Male Ages 65 and over    Yearly exam:             See your health care provider every year in order to  o   Review health changes.   o   Discuss preventive care.    o   Review your medicines if your doctor has prescribed any.    Talk with your health care provider about whether you should have a test to screen for prostate cancer (PSA).    Every 3 years, have a diabetes test (fasting glucose). If you are at risk for diabetes, you should have this test more often.    Every 5 years, " have a cholesterol test. Have this test more often if you are at risk for high cholesterol or heart disease.     Every 10 years, have a colonoscopy. Or, have a yearly FIT test (stool test). These exams will check for colon cancer.    Talk to with your health care provider about screening for Abdominal Aortic Aneurysm if you have a family history of AAA or have a history of smoking.  Shots:     Get a flu shot each year.     Get a tetanus shot every 10 years.     Talk to your doctor about your pneumonia vaccines. There are now two you should receive - Pneumovax (PPSV 23) and Prevnar (PCV 13).    Talk to your pharmacist about a shingles vaccine.     Talk to your doctor about the hepatitis B vaccine.  Nutrition:     Eat at least 5 servings of fruits and vegetables each day.     Eat whole-grain bread, whole-wheat pasta and brown rice instead of white grains and rice.     Get adequate Calcium and Vitamin D.   Lifestyle    Exercise for at least 150 minutes a week (30 minutes a day, 5 days a week). This will help you control your weight and prevent disease.     Limit alcohol to one drink per day.     No smoking.     Wear sunscreen to prevent skin cancer.     See your dentist every six months for an exam and cleaning.     See your eye doctor every 1 to 2 years to screen for conditions such as glaucoma, macular degeneration and cataracts.          Follow-ups after your visit        Follow-up notes from your care team     Return in about 4 weeks (around 11/23/2018) for Medical Check Up.      Who to contact     If you have questions or need follow up information about today's clinic visit or your schedule please contact [facility] directly at [phone].  Normal or non-critical lab and imaging results will be communicated to you by MyChart, letter or phone within 4 business days after the clinic has received the results. If you do not hear from us within 7 days, please contact the clinic through MyChart or phone.  If you have a critical or abnormal lab result, we will notify you by phone as soon as possible.  Submit refill requests through [employer] or call your pharmacy and they will forward the refill request to us. Please allow 3 business days for your refill to be completed.          Additional Information About Your Visit        [employer] Information     [employer] gives you secure access to your electronic health record. If you see a primary care provider, you can also send messages to your care team and make appointments. If you have questions, please call your primary care clinic.  If you do not have a primary care provider, please call [phone] and they will assist you.        Care EveryWhere ID     This is your Care EveryWhere ID. This could be used by other organizations to access your [facility] medical records  [id number]        Your Vitals Were     BMI (Body Mass Index)                   32.93 kg/m2            Blood Pressure from Last 3 Encounters:   10/26/18 102/70   03/22/18 102/66   12/27/17 122/80    Weight from Last 3 Encounters:   10/26/18 204 lb (92.5 kg)   03/22/18 195 lb (88.5 kg)   12/01/17 190 lb (86.2 kg)              We Performed the Following     CBC with platelets and differential     Comprehensive metabolic panel     FLU VACCINE, INCREASED ANTIGEN, PRESV FREE, AGE 65+ [86306]     Lipid panel reflex to direct LDL Fasting     PSA, screen     Vaccine Administration, Initial [05277]          Today's Medication Changes          These changes are accurate as of 10/26/18  9:52 AM.  If you have any questions, ask your nurse or doctor.               Start taking these medicines.        Dose/Directions    oxybutynin 5 MG tablet   Commonly known as:  DITROPAN   Used for:  Urge incontinence        Dose:  5 mg   Take 1 tablet (5 mg) by mouth At Bedtime   Quantity:  30 tablet   Refills:  1         Stop taking these medicines if you haven't already. Please contact your care team if you have questions.      clobetasol propionate 0.05 % Foam           clotrimazole 1 % cream   Commonly known as:  LOTRIMIN           ibuprofen 600 MG tablet   Commonly known as:  IBU                Where to get your medicines      These medications were sent to [email or web address] - [place] - [address]  [address], [place] [address]     Phone:  [phone]     oxybutynin 5 MG tablet         Some of these will need a paper prescription and others can be bought over the counter.  Ask your nurse if you have questions.     Bring a paper prescription for each of these medications     simvastatin 40 MG tablet                Primary Care Provider Office Phone # Fax #    Emeka Hale [phone] [address] [place] [address]        Equal Access to Services     [facility]: Hadii sarah gill hadasho Soomaali, waaxda luqadaha, qaybta kaalmada adeegyada, waxay jettin haytwila michel . So [facility] [phone].    ATENCIÓN: Si habla español, tiene a squires disposición servicios gratuitos de asistencia lingüística. [facility] [phone].    We comply with applicable federal civil rights laws and [place] laws. We do not discriminate on the basis of race, color, national origin, age, disability, sex, sexual orientation, or gender identity.            Thank you!     Thank you for choosing [facility]  for your care. Our goal is always to provide you with excellent care. Hearing back from our patients is one way we can continue to improve our services. Please take a few minutes to complete the written survey that you may receive in the mail after your visit with us. Thank you!             Your Updated Medication List - Protect others around you: Learn how to safely use, store and throw away your medicines at www.disposemymeds.org.          This list is accurate as of 10/26/18  9:52 AM.  Always use your most recent med list.                    Brand Name Dispense Instructions for use Diagnosis    lisinopril 10 MG tablet    PRINIVIL/ZESTRIL    90 tablet    TAKE 1 TABLET(10 MG) BY MOUTH DAILY    Essential hypertension with goal blood pressure less than 140/90       loratadine-pseudoePHEDrine  MG per 24 hr tablet    CLARITIN-D 24-hour     Take 1 tablet by mouth daily        oxybutynin 5 MG tablet    DITROPAN    30 tablet    Take 1 tablet (5 mg) by mouth At Bedtime    Urge incontinence       simvastatin 40 MG tablet    ZOCOR    90 tablet    Take 1 tablet (40 mg) by mouth At Bedtime    Hyperlipidemia LDL goal <130

## 2018-10-26 NOTE — PATIENT INSTRUCTIONS
"  Flu shot today.    Get your shingles vaccine (\"Shingrix\") at our pharmacy downstairs (or at another pharmacy), sometime this year--even if you've already received the old \"Zostavax\" shingles vaccine.  The \"Shingrix\" has better immunity and lasts longer, and is cheaper if you get it directly at a pharmacy.  You should not need an appointment.     You will need a second Shingrix anywhere from 2-6 months later.    Lab work downstairs today.  Directions:  As you walk through the first floor, you'll see (on the right) first the pharmacy, then some bathrooms, then the \"Lab and Imaging\" area. Give them your name at the window there and wait for them to call you.     Trial of Ditropan, nightly before bed, and follow up in 1 month.    COlonoscopy next month.    Emeka Hale MD  Internal Medicine and Pediatrics     Preventive Health Recommendations:       Male Ages 65 and over    Yearly exam:             See your health care provider every year in order to  o   Review health changes.   o   Discuss preventive care.    o   Review your medicines if your doctor has prescribed any.    Talk with your health care provider about whether you should have a test to screen for prostate cancer (PSA).    Every 3 years, have a diabetes test (fasting glucose). If you are at risk for diabetes, you should have this test more often.    Every 5 years, have a cholesterol test. Have this test more often if you are at risk for high cholesterol or heart disease.     Every 10 years, have a colonoscopy. Or, have a yearly FIT test (stool test). These exams will check for colon cancer.    Talk to with your health care provider about screening for Abdominal Aortic Aneurysm if you have a family history of AAA or have a history of smoking.  Shots:     Get a flu shot each year.     Get a tetanus shot every 10 years.     Talk to your doctor about your pneumonia vaccines. There are now two you should receive - Pneumovax (PPSV 23) and Prevnar (PCV " 13).    Talk to your pharmacist about a shingles vaccine.     Talk to your doctor about the hepatitis B vaccine.  Nutrition:     Eat at least 5 servings of fruits and vegetables each day.     Eat whole-grain bread, whole-wheat pasta and brown rice instead of white grains and rice.     Get adequate Calcium and Vitamin D.   Lifestyle    Exercise for at least 150 minutes a week (30 minutes a day, 5 days a week). This will help you control your weight and prevent disease.     Limit alcohol to one drink per day.     No smoking.     Wear sunscreen to prevent skin cancer.     See your dentist every six months for an exam and cleaning.     See your eye doctor every 1 to 2 years to screen for conditions such as glaucoma, macular degeneration and cataracts.
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
## 2018-10-26 NOTE — TELEPHONE ENCOUNTER
Not due for a refill.     oxybutynin (DITROPAN) 5 MG tablet was filled on 10/26/2018, qty 30 with 1 refills.

## 2018-10-27 LAB
ALBUMIN SERPL-MCNC: 4.2 G/DL (ref 3.4–5)
ALP SERPL-CCNC: 75 U/L (ref 40–150)
ALT SERPL W P-5'-P-CCNC: 21 U/L (ref 0–70)
ANION GAP SERPL CALCULATED.3IONS-SCNC: 7 MMOL/L (ref 3–14)
AST SERPL W P-5'-P-CCNC: 22 U/L (ref 0–45)
BILIRUB SERPL-MCNC: 0.8 MG/DL (ref 0.2–1.3)
BUN SERPL-MCNC: 32 MG/DL (ref 7–30)
CALCIUM SERPL-MCNC: 9.2 MG/DL (ref 8.5–10.1)
CHLORIDE SERPL-SCNC: 105 MMOL/L (ref 94–109)
CHOLEST SERPL-MCNC: 209 MG/DL
CO2 SERPL-SCNC: 24 MMOL/L (ref 20–32)
CREAT SERPL-MCNC: 0.98 MG/DL (ref 0.66–1.25)
GFR SERPL CREATININE-BSD FRML MDRD: 75 ML/MIN/1.7M2
GLUCOSE SERPL-MCNC: 109 MG/DL (ref 70–99)
HDLC SERPL-MCNC: 31 MG/DL
LDLC SERPL CALC-MCNC: 116 MG/DL
NONHDLC SERPL-MCNC: 178 MG/DL
POTASSIUM SERPL-SCNC: 5 MMOL/L (ref 3.4–5.3)
PROT SERPL-MCNC: 7.8 G/DL (ref 6.8–8.8)
PSA SERPL-ACNC: <0.01 UG/L (ref 0–4)
SODIUM SERPL-SCNC: 136 MMOL/L (ref 133–144)
TRIGL SERPL-MCNC: 312 MG/DL

## 2018-10-27 ASSESSMENT — ANXIETY QUESTIONNAIRES: GAD7 TOTAL SCORE: 0

## 2018-11-28 ENCOUNTER — TRANSFERRED RECORDS (OUTPATIENT)
Dept: HEALTH INFORMATION MANAGEMENT | Facility: CLINIC | Age: 73
End: 2018-11-28

## 2019-03-29 ENCOUNTER — MYC MEDICAL ADVICE (OUTPATIENT)
Dept: PEDIATRICS | Facility: CLINIC | Age: 74
End: 2019-03-29

## 2019-03-29 NOTE — TELEPHONE ENCOUNTER
Pt sent a Car in the Cloud message requesting alternative rx for allergies.  Claritin not resolving symptoms.    Please advise-    Lizzeth Nicole RN

## 2019-07-15 ENCOUNTER — TRANSFERRED RECORDS (OUTPATIENT)
Dept: HEALTH INFORMATION MANAGEMENT | Facility: CLINIC | Age: 74
End: 2019-07-15

## 2019-09-29 ENCOUNTER — HEALTH MAINTENANCE LETTER (OUTPATIENT)
Age: 74
End: 2019-09-29

## 2019-10-08 ENCOUNTER — TELEPHONE (OUTPATIENT)
Dept: PEDIATRICS | Facility: CLINIC | Age: 74
End: 2019-10-08

## 2019-10-08 DIAGNOSIS — J30.2 SEASONAL ALLERGIC RHINITIS, UNSPECIFIED TRIGGER: Primary | ICD-10-CM

## 2019-10-08 RX ORDER — MONTELUKAST SODIUM 10 MG/1
10 TABLET ORAL AT BEDTIME
Qty: 30 TABLET | Refills: 0 | Status: SHIPPED | OUTPATIENT
Start: 2019-10-08 | End: 2019-11-14

## 2019-10-08 NOTE — TELEPHONE ENCOUNTER
Please call.    He should first try both zyrtec and flonase for 2 weeks, over-the-counter.  If not helping, I've sent a 30 day trial of singulair.    Let us know if nothing is helping in next few weeks.    Emeka Hale MD  Internal Medicine and Pediatrics

## 2019-10-08 NOTE — TELEPHONE ENCOUNTER
Reason for Call:  Other call back    Detailed comments: Pt came in to schedule his physical and did so for 11/14 with Dr. Hale.  He wanted to ask if it would be possible to talk to Dr. Hale about getting a prescription for allergies.  He says he has been having allergies and over the counter products don't seem to help. Since provider is booking out a month the patient wanted to see if he could discuss this/ possible get a prescription over the phone.  Would we be able to schedule a telephone visit for this ?  Please review request and call patient back to advise when available.    Phone Number Patient can be reached at: Home number on file [phone] (home)    Best Time: any    Can we leave a detailed message on this number? YES    Call taken on 10/8/2019 at 12:31 PM by Ann Hurst

## 2019-10-10 ENCOUNTER — VIRTUAL VISIT (OUTPATIENT)
Dept: PEDIATRICS | Facility: CLINIC | Age: 74
End: 2019-10-10
Payer: MEDICARE

## 2019-10-10 ENCOUNTER — TELEPHONE (OUTPATIENT)
Dept: PEDIATRICS | Facility: CLINIC | Age: 74
End: 2019-10-10

## 2019-10-10 DIAGNOSIS — J30.2 SEASONAL ALLERGIC RHINITIS, UNSPECIFIED TRIGGER: ICD-10-CM

## 2019-10-10 DIAGNOSIS — J01.01 ACUTE RECURRENT MAXILLARY SINUSITIS: Primary | ICD-10-CM

## 2019-10-10 PROCEDURE — 99442 ZZC PHYSICIAN TELEPHONE EVALUATION 11-20 MIN: CPT | Performed by: INTERNAL MEDICINE

## 2019-10-10 NOTE — PROGRESS NOTES
"Paco Eckert is a 74 year old male who is being evaluated via a billable telephone visit.      The patient has been notified of following:     \"This telephone visit will be conducted via a call between you and your physician/provider. We have found that certain health care needs can be provided without the need for a physical exam.  This service lets us provide the care you need with a short phone conversation.  If a prescription is necessary we can send it directly to your pharmacy.  If lab work is needed we can place an order for that and you can then stop by our lab to have the test done at a later time.    If during the course of the call the physician/provider feels a telephone visit is not appropriate, you will not be charged for this service.\"     Consent has been obtained for this service by 1 care team member: yes. See the scanned image in the medical record.    Paco Eckert complains of    Chief Complaint   Patient presents with     URI       I have reviewed and updated the patient's Past Medical History, Social History, Family History and Medication List.    ALLERGIES  Seasonal allergies    Srinivas Mota CMA    (MA signature)    Additional provider notes      Assessment/Plan:    Has had seasonal allergies, all the time. But has been worsening.  Not coughing up any yellow discharge, but has significant nasal discharge.   No fevers. No achiness or fatigue. No steam showers.  Has been on no cough or cold.     Has been taking claritin, claritin D, flonase, but has to cough a lot at night.  It gags him.  Bothering his wife as well.     I called in some singulair, which he tried.  It seems to be a bit better    (J01.01) Acute recurrent maxillary sinusitis  (primary encounter diagnosis)  Comment:   Plan: amoxicillin-clavulanate (AUGMENTIN) 875-125 MG         tablet        Fill only if trial of singulair (and flonase and claritin) is not helping.  Saline spray (nonmedicated salt water) in small " squirt bottles can be used every hour or two during the day, as can humidifiers during the night.  Steam showers can help keep mucous loose.   Begin trial of mucinex as well.     (J30.2) Seasonal allergic rhinitis, unspecified trigger  Comment:   Plan: as above.  Trial of singulair first.        .    I have reviewed the note as documented above.  This accurately captures the substance of my conversation with the patient.        Total time of call between patient and provider was 15  minutes       Emeka Hale MD

## 2019-10-10 NOTE — TELEPHONE ENCOUNTER
Pt calling to inform his phone was acting up while driving and couldn't  a phone call at 9:30am. Pt would like to speak with PCP about symptoms.     This writer told pt to expect a call back from PCP or RN later today.     Please call pt when possible.       Detailed messages are ok at phone number [phone]

## 2019-10-22 DIAGNOSIS — I10 ESSENTIAL HYPERTENSION WITH GOAL BLOOD PRESSURE LESS THAN 140/90: ICD-10-CM

## 2019-10-22 RX ORDER — LISINOPRIL 10 MG/1
TABLET ORAL
Qty: 30 TABLET | Refills: 0 | Status: SHIPPED | OUTPATIENT
Start: 2019-10-22 | End: 2019-10-22

## 2019-10-22 RX ORDER — LISINOPRIL 10 MG/1
TABLET ORAL
Qty: 90 TABLET | Refills: 0 | Status: SHIPPED | OUTPATIENT
Start: 2019-10-22 | End: 2019-11-14

## 2019-10-22 NOTE — TELEPHONE ENCOUNTER
Medication is being filled for 1 time refill only due to:  Patient needs to be seen because it will be a year since his last visit on 10/26/18. .  Patient has an appointment scheduled for 11/14/19 with Dr. Hale.     KERRIE Singleton RN

## 2019-10-22 NOTE — TELEPHONE ENCOUNTER
"Requested Prescriptions   Pending Prescriptions Disp Refills     lisinopril (PRINIVIL/ZESTRIL) 10 MG tablet [Pharmacy Med Name: LISINOPRIL 10MG TABLETS]    Last Written Prescription Date:  10/22/2018  Last Fill Quantity: 90,  # refills: 3   Last office visit: 10/10/2019 with prescribing provider:  Emeka Hale     Future Office Visit:   Next 5 appointments (look out 90 days)    Nov 14, 2019  9:50 AM CST  Annual Wellness Visit with Emeka Hale MD,  [facility] ROOM 41  [facility] [address] [place] [address]  [address]  [facility] [address] [phone]        90 tablet 0     Sig: TAKE 1 TABLET(10 MG) BY MOUTH DAILY       ACE Inhibitors (Including Combos) Protocol Passed - 10/22/2019  8:38 AM        Passed - Blood pressure under 140/90 in past 12 months     BP Readings from Last 3 Encounters:   10/26/18 102/70   03/22/18 102/66   12/27/17 122/80                 Passed - Recent (12 mo) or future (30 days) visit within the authorizing provider's specialty     Patient has had an office visit with the authorizing provider or a provider within the authorizing providers department within the previous 12 mos or has a future within next 30 days. See \"Patient Info\" tab in inbasket, or \"Choose Columns\" in Meds & Orders section of the refill encounter.              Passed - Medication is active on med list        Passed - Patient is age 18 or older        Passed - Normal serum creatinine on file in past 12 months     Recent Labs   Lab Test 10/26/18  0957   CR 0.98             Passed - Normal serum potassium on file in past 12 months     Recent Labs   Lab Test 10/26/18  0957   POTASSIUM 5.0                 "

## 2019-10-22 NOTE — TELEPHONE ENCOUNTER
Duplicate.     lisinopril (PRINIVIL/ZESTRIL) 10 MG tablet was filled on 10/22/2019, qty 30 with 0 refills.

## 2019-11-13 ENCOUNTER — PRE VISIT (OUTPATIENT)
Dept: PEDIATRICS | Facility: CLINIC | Age: 74
End: 2019-11-13

## 2019-11-13 NOTE — TELEPHONE ENCOUNTER
"Pre-Visit Planning     Future Appointments   Date Time Provider Department Center   11/14/2019  9:50 AM Emeka Hale MD [facility]     Appointment Notes for this encounter:   Data Unavailable    Questionnaires Reviewed/Assigned  No additional questionnaires are needed    Patient preferred phone number: [phone]    Spoke to patient via phone. Patient does not have additional questions or concerns.        Visit is preventive. Reviewed purpose of preventive visit with patient.    Health Maintenance Due   Topic Date Due     ANNUAL REVIEW OF HM ORDERS  1945     ADVANCE CARE PLANNING  1945     AORTIC ANEURYSM SCREENING (SYSTEM ASSIGNED)  05/05/2011     ZOSTER IMMUNIZATION (2 of 3) 12/31/2013     DEXA  10/29/2018     INFLUENZA VACCINE (1) 09/01/2019     MICHA ASSESSMENT  10/26/2019     FALL RISK ASSESSMENT  10/26/2019     PHQ-9  10/26/2019     MEDICARE ANNUAL WELLNESS VISIT  10/26/2019     Patient is due for:    No appointment needed.    [employer]  Patient is active on [employer].    Questionnaire Review   Offered information on completing questionnaires via [employer].    Call Summary  \"Thank you for your time today.  If anything comes up before your appointment, please feel free to contact us at [phone].\"  "

## 2019-11-14 ENCOUNTER — OFFICE VISIT (OUTPATIENT)
Dept: PEDIATRICS | Facility: CLINIC | Age: 74
End: 2019-11-14
Payer: COMMERCIAL

## 2019-11-14 ENCOUNTER — TELEPHONE (OUTPATIENT)
Dept: PEDIATRICS | Facility: CLINIC | Age: 74
End: 2019-11-14

## 2019-11-14 VITALS
BODY MASS INDEX: 30.74 KG/M2 | RESPIRATION RATE: 16 BRPM | SYSTOLIC BLOOD PRESSURE: 108 MMHG | HEIGHT: 66 IN | OXYGEN SATURATION: 99 % | HEART RATE: 68 BPM | WEIGHT: 191.3 LBS | TEMPERATURE: 97.4 F | DIASTOLIC BLOOD PRESSURE: 74 MMHG

## 2019-11-14 DIAGNOSIS — R35.0 FREQUENCY OF URINATION AND POLYURIA: ICD-10-CM

## 2019-11-14 DIAGNOSIS — J30.2 SEASONAL ALLERGIC RHINITIS, UNSPECIFIED TRIGGER: ICD-10-CM

## 2019-11-14 DIAGNOSIS — I10 HYPERTENSION GOAL BP (BLOOD PRESSURE) < 140/90: ICD-10-CM

## 2019-11-14 DIAGNOSIS — C20 RECTAL CANCER (H): ICD-10-CM

## 2019-11-14 DIAGNOSIS — I10 ESSENTIAL HYPERTENSION WITH GOAL BLOOD PRESSURE LESS THAN 140/90: ICD-10-CM

## 2019-11-14 DIAGNOSIS — C61 MALIGNANT NEOPLASM OF PROSTATE (H): ICD-10-CM

## 2019-11-14 DIAGNOSIS — R35.89 FREQUENCY OF URINATION AND POLYURIA: ICD-10-CM

## 2019-11-14 DIAGNOSIS — Z23 NEED FOR PROPHYLACTIC VACCINATION AND INOCULATION AGAINST INFLUENZA: ICD-10-CM

## 2019-11-14 DIAGNOSIS — Z00.00 ENCOUNTER FOR MEDICARE ANNUAL WELLNESS EXAM: Primary | ICD-10-CM

## 2019-11-14 DIAGNOSIS — R73.01 IMPAIRED FASTING GLUCOSE: ICD-10-CM

## 2019-11-14 DIAGNOSIS — E78.5 HYPERLIPIDEMIA LDL GOAL <130: ICD-10-CM

## 2019-11-14 LAB
ALBUMIN SERPL-MCNC: 4 G/DL (ref 3.4–5)
ALP SERPL-CCNC: 71 U/L (ref 40–150)
ALT SERPL W P-5'-P-CCNC: 16 U/L (ref 0–70)
ANION GAP SERPL CALCULATED.3IONS-SCNC: 5 MMOL/L (ref 3–14)
AST SERPL W P-5'-P-CCNC: 12 U/L (ref 0–45)
BILIRUB SERPL-MCNC: 0.7 MG/DL (ref 0.2–1.3)
BUN SERPL-MCNC: 24 MG/DL (ref 7–30)
CALCIUM SERPL-MCNC: 9 MG/DL (ref 8.5–10.1)
CHLORIDE SERPL-SCNC: 107 MMOL/L (ref 94–109)
CHOLEST SERPL-MCNC: 145 MG/DL
CO2 SERPL-SCNC: 25 MMOL/L (ref 20–32)
CREAT SERPL-MCNC: 0.97 MG/DL (ref 0.66–1.25)
GFR SERPL CREATININE-BSD FRML MDRD: 77 ML/MIN/{1.73_M2}
GLUCOSE SERPL-MCNC: 102 MG/DL (ref 70–99)
HDLC SERPL-MCNC: 28 MG/DL
LDLC SERPL CALC-MCNC: 84 MG/DL
NONHDLC SERPL-MCNC: 117 MG/DL
POTASSIUM SERPL-SCNC: 4.7 MMOL/L (ref 3.4–5.3)
PROT SERPL-MCNC: 7.3 G/DL (ref 6.8–8.8)
PSA SERPL-MCNC: <0.01 UG/L (ref 0–4)
SODIUM SERPL-SCNC: 137 MMOL/L (ref 133–144)
TRIGL SERPL-MCNC: 166 MG/DL

## 2019-11-14 PROCEDURE — G0008 ADMIN INFLUENZA VIRUS VAC: HCPCS | Performed by: INTERNAL MEDICINE

## 2019-11-14 PROCEDURE — 84153 ASSAY OF PSA TOTAL: CPT | Performed by: INTERNAL MEDICINE

## 2019-11-14 PROCEDURE — 90662 IIV NO PRSV INCREASED AG IM: CPT | Performed by: INTERNAL MEDICINE

## 2019-11-14 PROCEDURE — 80053 COMPREHEN METABOLIC PANEL: CPT | Performed by: INTERNAL MEDICINE

## 2019-11-14 PROCEDURE — G0439 PPPS, SUBSEQ VISIT: HCPCS | Performed by: INTERNAL MEDICINE

## 2019-11-14 PROCEDURE — 36415 COLL VENOUS BLD VENIPUNCTURE: CPT | Performed by: INTERNAL MEDICINE

## 2019-11-14 PROCEDURE — 80061 LIPID PANEL: CPT | Performed by: INTERNAL MEDICINE

## 2019-11-14 PROCEDURE — 99214 OFFICE O/P EST MOD 30 MIN: CPT | Mod: 25 | Performed by: INTERNAL MEDICINE

## 2019-11-14 RX ORDER — LISINOPRIL 10 MG/1
10 TABLET ORAL DAILY
Qty: 90 TABLET | Refills: 3 | Status: SHIPPED | OUTPATIENT
Start: 2019-11-14 | End: 2021-01-20

## 2019-11-14 RX ORDER — SIMVASTATIN 40 MG
40 TABLET ORAL AT BEDTIME
Qty: 90 TABLET | Refills: 3 | Status: SHIPPED | OUTPATIENT
Start: 2019-11-14 | End: 2021-01-20

## 2019-11-14 RX ORDER — TADALAFIL 5 MG/1
5 TABLET ORAL EVERY 24 HOURS
Qty: 30 TABLET | Refills: 2 | Status: SHIPPED | OUTPATIENT
Start: 2019-11-14 | End: 2021-11-11

## 2019-11-14 RX ORDER — MONTELUKAST SODIUM 10 MG/1
TABLET ORAL
Qty: 90 TABLET | Refills: 0 | Status: SHIPPED | OUTPATIENT
Start: 2019-11-14 | End: 2021-11-11

## 2019-11-14 RX ORDER — MONTELUKAST SODIUM 10 MG/1
10 TABLET ORAL AT BEDTIME
Qty: 30 TABLET | Refills: 2 | Status: SHIPPED | OUTPATIENT
Start: 2019-11-14 | End: 2019-11-14

## 2019-11-14 ASSESSMENT — ENCOUNTER SYMPTOMS
FEVER: 0
MYALGIAS: 0
CONSTIPATION: 0
DYSURIA: 0
DIARRHEA: 0
PARESTHESIAS: 0
NAUSEA: 0
DIZZINESS: 0
EYE PAIN: 0
JOINT SWELLING: 0
HEARTBURN: 0
NERVOUS/ANXIOUS: 0
ARTHRALGIAS: 0
FREQUENCY: 1
COUGH: 0
CHILLS: 0
HEADACHES: 0
PALPITATIONS: 0
HEMATURIA: 0
SORE THROAT: 0
ABDOMINAL PAIN: 0
HEMATOCHEZIA: 0

## 2019-11-14 ASSESSMENT — ANXIETY QUESTIONNAIRES

## 2019-11-14 ASSESSMENT — PATIENT HEALTH QUESTIONNAIRE - PHQ9
SUM OF ALL RESPONSES TO PHQ QUESTIONS 1-9: 1
5. POOR APPETITE OR OVEREATING: NOT AT ALL

## 2019-11-14 ASSESSMENT — ACTIVITIES OF DAILY LIVING (ADL): CURRENT_FUNCTION: NO ASSISTANCE NEEDED

## 2019-11-14 ASSESSMENT — MIFFLIN-ST. JEOR: SCORE: 1555.48

## 2019-11-14 NOTE — PROGRESS NOTES
"SUBJECTIVE:   Paco Eckert is a 74 year old male who presents for Preventive Visit.    Are you in the first 12 months of your Medicare coverage?  No    Healthy Habits:     In general, how would you rate your overall health?  Good    Frequency of exercise:  4-5 days/week    Duration of exercise:  15-30 minutes    Do you usually eat at least 4 servings of fruit and vegetables a day, include whole grains    & fiber and avoid regularly eating high fat or \"junk\" foods?  No    Taking medications regularly:  Yes    Medication side effects:  None    Ability to successfully perform activities of daily living:  No assistance needed    Home Safety:  No safety concerns identified    Hearing Impairment:  Difficulty understanding speech on the telephone    In the past 6 months, have you been bothered by leaking of urine? Yes    In general, how would you rate your overall mental or emotional health?  Good      PHQ-2 Total Score: 0    Pt has complaints regarding frequent urination at night and post nasal drainage while sleeping.   Singulair made a bit of a difference, but over-the-counter meds not helping (flonase and claritin).     Still with awaking 3-4 times per night to urinate.  Feels like sleeps fine, but wakes a lot.  Urinates a lot during the day as well.  No accidents, but occasional leakage if is out in the cold.   Thus far has failed flomax (tamsulosin), hytrin (terazosin), and ditropan.      Do you feel safe in your environment? Yes    Have you ever done Advance Care Planning? (For example, a Health Directive, POLST, or a discussion with a medical provider or your loved ones about your wishes): Pt has a will. Denies information regarding advanced care planning today.    Does wear hearing aids.  Fall risk  Fallen 2 or more times in the past year?: No  Any fall with injury in the past year?: No    Cognitive Screening   1) Repeat 3 items (Leader, Season, Table)    2) Clock draw: NORMAL  3) 3 item recall: Recalls 3 " objects  Results: 3 items recalled: COGNITIVE IMPAIRMENT LESS LIKELY    Mini-CogTM Copyright KATIE Aranda. Licensed by the author for use in [facility]; reprinted with permission (fara@.[facility]). All rights reserved.      Do you have sleep apnea, excessive snoring or daytime drowsiness?: no    Reviewed and updated as needed this visit by clinical staff  Tobacco  Allergies  Meds  Med Hx  Surg Hx  Fam Hx  Soc Hx        Reviewed and updated as needed this visit by Provider  Meds        Social History     Tobacco Use     Smoking status: Never Smoker     Smokeless tobacco: Never Used   Substance Use Topics     Alcohol use: Yes     Alcohol/week: 0.0 standard drinks     Comment: 10-12 drinks per week         Alcohol Use 11/14/2019   Prescreen: >3 drinks/day or >7 drinks/week? No   Prescreen: >3 drinks/day or >7 drinks/week? -     Still with constant dripping.  COughing up phlegm and mucuous.        Current providers sharing in care for this patient include:   Patient Care Team:  Emeka Hale MD as PCP - General  Emeka Hale MD as Assigned PCP    The following health maintenance items are reviewed in Epic and correct as of today:  Health Maintenance   Topic Date Due     ANNUAL REVIEW OF HM ORDERS  1945     ADVANCE CARE PLANNING  1945     AORTIC ANEURYSM SCREENING (SYSTEM ASSIGNED)  05/05/2011     ZOSTER IMMUNIZATION (2 of 3) 12/31/2013     DEXA  10/29/2018     INFLUENZA VACCINE (1) 09/01/2019     MICHA ASSESSMENT  10/26/2019     FALL RISK ASSESSMENT  10/26/2019     PHQ-9  10/26/2019     MEDICARE ANNUAL WELLNESS VISIT  10/26/2019     LIPID  10/26/2023     DTAP/TDAP/TD IMMUNIZATION (3 - Td) 11/08/2026     COLONOSCOPY  11/28/2028     HEPATITIS C SCREENING  Completed     PNEUMOCOCCAL IMMUNIZATION 65+ HIGH/HIGHEST RISK  Completed     IPV IMMUNIZATION  Aged Out     MENINGITIS IMMUNIZATION  Aged Out     Lab work is in process  Labs reviewed in EPIC  BP Readings from Last 3 Encounters:   11/14/19  108/74   10/26/18 102/70   03/22/18 102/66    Wt Readings from Last 3 Encounters:   11/14/19 86.8 kg (191 lb 4.8 oz)   10/26/18 92.5 kg (204 lb)   03/22/18 88.5 kg (195 lb)                  Patient Active Problem List   Diagnosis     Malignant neoplasm of prostate (H)     Gouty arthropathy     Other psoriasis     Osteoporosis     Leukopenia     Frequency of urination and polyuria     HYPERLIPIDEMIA LDL GOAL <130     Impaired fasting glucose     Fatty liver     Hypertension goal BP (blood pressure) < 140/90     Rectal cancer (H)     Cervical pain     Past Surgical History:   Procedure Laterality Date     APPLY WOUND VAC N/A 10/21/2014    Procedure: APPLY WOUND VAC;  Surgeon: Mir Hernandez MD;  Location:  OR     COLONOSCOPY N/A 10/10/2014    Procedure: COMBINED COLONOSCOPY, SINGLE BIOPSY/POLYPECTOMY BY BIOPSY;  Surgeon: Mir Hernandez MD;  Location:  GI     COLOSTOMY N/A 10/21/2014    Procedure: COLOSTOMY;  Surgeon: Mir Hernandez MD;  Location:  OR     ORTHOPEDIC SURGERY Bilateral 2000 ?    rotater repair both     ORTHOPEDIC SURGERY Right     ankle      RESECTION ABDOMINAL PERINEAL N/A 10/21/2014    Procedure: RESECTION ABDOMINAL PERINEAL;  Surgeon: Mir Hernandez MD;  Location:  OR       Social History     Tobacco Use     Smoking status: Never Smoker     Smokeless tobacco: Never Used   Substance Use Topics     Alcohol use: Yes     Alcohol/week: 0.0 standard drinks     Comment: 10-12 drinks per week     Family History   Problem Relation Age of Onset     Diabetes Brother      Diabetes Sister      Hypertension Sister      Hypertension Brother          Current Outpatient Medications   Medication Sig Dispense Refill     lisinopril (PRINIVIL/ZESTRIL) 10 MG tablet Take 1 tablet (10 mg) by mouth daily 90 tablet 3     loratadine-pseudoePHEDrine (CLARITIN-D 24-HOUR)  MG per 24 hr tablet Take 1 tablet by mouth daily       montelukast (SINGULAIR) 10 MG tablet Take 1 tablet (10 mg) by mouth At Bedtime 30  "tablet 2     simvastatin (ZOCOR) 40 MG tablet Take 1 tablet (40 mg) by mouth At Bedtime 90 tablet 3     tadalafil (CIALIS) 5 MG tablet Take 1 tablet (5 mg) by mouth every 24 hours 30 tablet 2     Allergies   Allergen Reactions     Seasonal Allergies          Review of Systems   Constitutional: Negative for chills and fever.   HENT: Positive for hearing loss. Negative for congestion, ear pain and sore throat.    Eyes: Negative for pain.   Respiratory: Negative for cough.    Cardiovascular: Negative for chest pain, palpitations and peripheral edema.   Gastrointestinal: Negative for abdominal pain, constipation, diarrhea, heartburn, hematochezia and nausea.   Genitourinary: Positive for frequency and urgency. Negative for dysuria, genital sores and hematuria.   Musculoskeletal: Negative for arthralgias, joint swelling and myalgias.   Neurological: Negative for dizziness, headaches and paresthesias.   Psychiatric/Behavioral: Negative for mood changes. The patient is not nervous/anxious.        OBJECTIVE:   /74 (BP Location: Right arm, Patient Position: Chair, Cuff Size: Adult Regular)   Pulse 68   Temp 97.4  F (36.3  C) (Oral)   Resp 16   Ht 1.676 m (5' 6\")   Wt 86.8 kg (191 lb 4.8 oz)   SpO2 99%   BMI 30.88 kg/m   Estimated body mass index is 30.88 kg/m  as calculated from the following:    Height as of this encounter: 1.676 m (5' 6\").    Weight as of this encounter: 86.8 kg (191 lb 4.8 oz).  Physical Exam  GENERAL: healthy, alert and no distress  EYES: Eyes grossly normal to inspection, PERRL and conjunctivae and sclerae normal  HENT: ear canals and TM's normal, nose and mouth without ulcers or lesions  NECK: no adenopathy, no asymmetry, masses, or scars and thyroid normal to palpation  RESP: lungs clear to auscultation - no rales, rhonchi or wheezes  CV: regular rate and rhythm, normal S1 S2, no S3 or S4, no murmur, click or rub, no peripheral edema and peripheral pulses strong  ABDOMEN: soft, nontender, " no hepatosplenomegaly, no masses and bowel sounds normal  MS: no gross musculoskeletal defects noted, no edema  SKIN: no suspicious lesions or rashes  NEURO: Normal strength and tone, mentation intact and speech normal  PSYCH: mentation appears normal, affect normal/bright    Diagnostic Test Results:  Labs reviewed in Epic    ASSESSMENT / PLAN:   1. Encounter for Medicare annual wellness exam  Discussed diet, exercise, testicular self exam, blood pressure, cholesterol, and need for cancer surveillance at appropriate ages.   - Comprehensive metabolic panel  - Lipid panel reflex to direct LDL Fasting    2. Malignant neoplasm of prostate (H)  Monitoring labs today .  - PSA, tumor marker    3. Frequency of urination and polyuria  Trial of cialis daily.   - tadalafil (CIALIS) 5 MG tablet; Take 1 tablet (5 mg) by mouth every 24 hours  Dispense: 30 tablet; Refill: 2  - OFFICE/OUTPT VISIT,EST,LEVL IV    4. Impaired fasting glucose  Recheck today.     5. Hyperlipidemia LDL goal <130  Continue statin.   - simvastatin (ZOCOR) 40 MG tablet; Take 1 tablet (40 mg) by mouth At Bedtime  Dispense: 90 tablet; Refill: 3    6. Rectal cancer (H)  Colonoscopy done with regularity at gastroenterology.     7. Hypertension goal BP (blood pressure) < 140/90  At goal.     8. Seasonal allergic rhinitis, unspecified trigger    - ALLERGY/ASTHMA ADULT REFERRAL  - montelukast (SINGULAIR) 10 MG tablet; Take 1 tablet (10 mg) by mouth At Bedtime  Dispense: 30 tablet; Refill: 2  - OFFICE/OUTPT VISIT,EST,LEVL IV    9. Essential hypertension with goal blood pressure less than 140/90  At goal.   - lisinopril (PRINIVIL/ZESTRIL) 10 MG tablet; Take 1 tablet (10 mg) by mouth daily  Dispense: 90 tablet; Refill: 3    COUNSELING:  Reviewed preventive health counseling, as reflected in patient instructions       Regular exercise       Healthy diet/nutrition       Vision screening       Hearing screening       Colon cancer screening       Prostate cancer  "screening    Estimated body mass index is 30.88 kg/m  as calculated from the following:    Height as of this encounter: 1.676 m (5' 6\").    Weight as of this encounter: 86.8 kg (191 lb 4.8 oz).    Weight management plan: Patient was referred to their PCP to discuss a diet and exercise plan.     reports that he has never smoked. He has never used smokeless tobacco.      Appropriate preventive services were discussed with this patient, including applicable screening as appropriate for cardiovascular disease, diabetes, osteopenia/osteoporosis, and glaucoma.  As appropriate for age/gender, discussed screening for colorectal cancer, prostate cancer, breast cancer, and cervical cancer. Checklist reviewing preventive services available has been given to the patient.    Reviewed patients plan of care and provided an AVS. The Basic Care Plan (routine screening as documented in Health Maintenance) for Paco meets the Care Plan requirement. This Care Plan has been established and reviewed with the Patient.    Counseling Resources:  ATP IV Guidelines  Pooled Cohorts Equation Calculator  Breast Cancer Risk Calculator  FRAX Risk Assessment  ICSI Preventive Guidelines  Dietary Guidelines for Americans, 2010  USDA's MyPlate  ASA Prophylaxis  Lung CA Screening    Emeka Hale MD  [facility]    Identified Health Risks:  "

## 2019-11-14 NOTE — PATIENT INSTRUCTIONS
"  Lab work downstairs today.  Directions:  As you walk through the first floor, you'll see (on the right) first the pharmacy, then some bathrooms, then the \"Lab and Imaging\" area. Give them your name at the window there and wait for them to call you.     Get your shingles vaccine (\"Shingrix\") at our pharmacy downstairs (or at another pharmacy), sometime this year--even if you've already received the old \"Zostavax\" shingles vaccine.  The \"Shingrix\" has better immunity and lasts longer, and is cheaper if you get it directly at a pharmacy.  You should not need an appointment.     You will need a second Shingrix anywhere from 2-6 months later.    Resume the singulair daily; follow up with the allerigst.    With respect to your urinary symptoms, we can try daily cialis to see if this helps.  If not, then maybe seeing the urologist again.   We have tried ditropan, flomax (tamsulosin), and hytrin (terazosin).    Flu shot today.     Emeka Hale MD  Internal Medicine and Pediatrics         Patient Education   Personalized Prevention Plan  You are due for the preventive services outlined below.  Your care team is available to assist you in scheduling these services.  If you have already completed any of these items, please share that information with your care team to update in your medical record.  Health Maintenance Due   Topic Date Due     ANNUAL REVIEW OF HM ORDERS  1945     Discuss Advance Care Planning  1945     AORTIC ANEURYSM SCREENING (SYSTEM ASSIGNED)  05/05/2011     Zoster (Shingles) Vaccine (2 of 3) 12/31/2013     Osteoporosis Screening  10/29/2018     Flu Vaccine (1) 09/01/2019     Anxiety Assessment  10/26/2019     FALL RISK ASSESSMENT  10/26/2019     Depression Assessment  10/26/2019     Annual Wellness Visit  10/26/2019     Preventive Health Recommendations  See your health care provider every year to    Review health changes.     Discuss preventive care.      Review your medicines if your doctor " has prescribed any.    Talk with your health care provider about whether you should have a test to screen for prostate cancer (PSA).    Every 3 years, have a diabetes test (fasting glucose). If you are at risk for diabetes, you should have this test more often.    Every 5 years, have a cholesterol test. Have this test more often if you are at risk for high cholesterol or heart disease.     Every 10 years, have a colonoscopy. Or, have a yearly FIT test (stool test). These exams will check for colon cancer.    Talk to with your health care provider about screening for Abdominal Aortic Aneurysm if you have a family history of AAA or have a history of smoking.    Shots:     Get a flu shot each year.     Get a tetanus shot every 10 years.     Talk to your doctor about your pneumonia vaccines. There are now two you should receive - Pneumovax (PPSV 23) and Prevnar (PCV 13).    Talk to your pharmacist about a shingles vaccine.     Talk to your doctor about the hepatitis B vaccine.    Nutrition:     Eat at least 5 servings of fruits and vegetables each day.     Eat whole-grain bread, whole-wheat pasta and brown rice instead of white grains and rice.     Get adequate Calcium and Vitamin D.     Lifestyle    Exercise for at least 150 minutes a week (30 minutes a day, 5 days a week). This will help you control your weight and prevent disease.     Limit alcohol to one drink per day.     No smoking.     Wear sunscreen to prevent skin cancer.     See your dentist every six months for an exam and cleaning.     See your eye doctor every 1 to 2 years to screen for conditions such as glaucoma, macular degeneration and cataracts.    Personalized Prevention Plan  You are due for the preventive services outlined below.  Your care team is available to assist you in scheduling these services.  If you have already completed any of these items, please share that information with your care team to update in your medical record.  Beep  Maintenance Due   Topic Date Due     ANNUAL REVIEW OF HM ORDERS  1945     Discuss Advance Care Planning  1945     AORTIC ANEURYSM SCREENING (SYSTEM ASSIGNED)  05/05/2011     Zoster (Shingles) Vaccine (2 of 3) 12/31/2013     Osteoporosis Screening  10/29/2018     Flu Vaccine (1) 09/01/2019     Anxiety Assessment  10/26/2019     FALL RISK ASSESSMENT  10/26/2019     Depression Assessment  10/26/2019     Annual Wellness Visit  10/26/2019

## 2019-11-14 NOTE — TELEPHONE ENCOUNTER
Prior Authorization Retail Medication Request    Medication/Dose: tadalafil (CIALIS) 5 MG tablet  ICD code (if different than what is on RX):  Frequency of urination and polyuria [R35.0, R35.8] and S/p Prostatectomy     Previously Tried and Failed:  Oxybutynin, tolterodine, tamsulosin  Rationale:  N/a    Insurance Name:  Medicare  Insurance ID:  [id number]      Pharmacy Information (if different than what is on RX)  Name:  [facility] [place] [address]  Phone:  [phone]    Abel Silva CMA ([facility])

## 2019-11-14 NOTE — TELEPHONE ENCOUNTER
Duplicate.     montelukast (SINGULAIR) 10 MG tablet was filled on 11/14/2019, qty 30 with 2 refills.

## 2019-11-14 NOTE — LETTER
November 18, 2019      Paco Eckert  [address]  [facility] [address]        To Whom It May Concern:    Paco Eckert was seen in our clinic. He does NOT suffer from erectile dysfunction, but has significant polyuria and bladders symptoms after his prostatectomy.  He has failed hytrin (terazosin) and flomax (tamsulosin), as well as detrol and finasteride (proscar).   Cialis will be his only other option to keep him for significant misery.       Sincerely,        Emeka Hale MD

## 2019-11-15 ASSESSMENT — ANXIETY QUESTIONNAIRES: GAD7 TOTAL SCORE: 0

## 2019-11-15 NOTE — TELEPHONE ENCOUNTER
Central Prior Authorization Team  Phone: [phone]    PA Initiation    Medication: tadalafil (CIALIS) 5 MG tablet  Insurance Company: [employer] - Phone [phone] Fax [phone]  Pharmacy Filling the Rx: [id number] - [place], [place] - [address] AT [address]  Filling Pharmacy Phone: [phone]  Filling Pharmacy Fax:    Start Date: 11/15/2019

## 2019-11-18 NOTE — TELEPHONE ENCOUNTER
PRIOR AUTHORIZATION DENIED    Medication: tadalafil (CIALIS) 5 MG tablet- DENIED    Denial Date: 11/15/2019    Denial Rational: Patient's diagnosis did not meet requirement(s) of a medically accepted indication for the use of this medication.         Appeal Information: If provider would like to appeal please provide a letter of medical necessity.

## 2020-07-15 ENCOUNTER — TRANSFERRED RECORDS (OUTPATIENT)
Dept: HEALTH INFORMATION MANAGEMENT | Facility: CLINIC | Age: 75
End: 2020-07-15

## 2020-12-04 ENCOUNTER — TELEPHONE (OUTPATIENT)
Dept: PEDIATRICS | Facility: CLINIC | Age: 75
End: 2020-12-04

## 2020-12-04 DIAGNOSIS — Z11.52 ENCOUNTER FOR SCREENING LABORATORY TESTING FOR COVID-19 VIRUS: ICD-10-CM

## 2020-12-04 DIAGNOSIS — E78.5 HYPERLIPIDEMIA LDL GOAL <130: Primary | ICD-10-CM

## 2020-12-04 DIAGNOSIS — Z20.822 EXPOSURE TO COVID-19 VIRUS: ICD-10-CM

## 2020-12-04 NOTE — TELEPHONE ENCOUNTER
Called and lvm to call back the clinic, when and if pt calls back please let him know someone will call  Him to schedule a COVID test.

## 2020-12-04 NOTE — TELEPHONE ENCOUNTER
I went ahead and pended labs for upcoming appointment on 12/14 with PCP. Routing to provider for review for COVID asymptomatic testing.    Srinivas Mota, CMA

## 2020-12-04 NOTE — TELEPHONE ENCOUNTER
Order/Referral Request:    Who is requesting: patient    Orders being requested: lab orders including cholesterol and covid asymptomatic.    Reason service is needed/diagnosis: Patient has physical with Dr Hale on Dec 14th and would like to do labs ahead of time. Please call him and let him know.    When are orders needed by: asap    Has this been discussed with Provider: No    Does patient have a preference on a Group/Provider/Facility? [facility]    Does patient have an appointment scheduled: No    Where to send Orders: N/A    Okay to leave detailed message?  Yes at Cell number on file:    Telephone Information:   Mobile [phone]       Routing

## 2020-12-08 DIAGNOSIS — E78.5 HYPERLIPIDEMIA LDL GOAL <130: ICD-10-CM

## 2020-12-08 DIAGNOSIS — Z11.52 ENCOUNTER FOR SCREENING LABORATORY TESTING FOR COVID-19 VIRUS: ICD-10-CM

## 2020-12-08 LAB
ALBUMIN SERPL-MCNC: 3.8 G/DL (ref 3.4–5)
ALP SERPL-CCNC: 73 U/L (ref 40–150)
ALT SERPL W P-5'-P-CCNC: 25 U/L (ref 0–70)
ANION GAP SERPL CALCULATED.3IONS-SCNC: 7 MMOL/L (ref 3–14)
AST SERPL W P-5'-P-CCNC: 21 U/L (ref 0–45)
BILIRUB SERPL-MCNC: 0.6 MG/DL (ref 0.2–1.3)
BUN SERPL-MCNC: 23 MG/DL (ref 7–30)
CALCIUM SERPL-MCNC: 9 MG/DL (ref 8.5–10.1)
CHLORIDE SERPL-SCNC: 109 MMOL/L (ref 94–109)
CHOLEST SERPL-MCNC: 151 MG/DL
CO2 SERPL-SCNC: 22 MMOL/L (ref 20–32)
CREAT SERPL-MCNC: 0.89 MG/DL (ref 0.66–1.25)
GFR SERPL CREATININE-BSD FRML MDRD: 84 ML/MIN/{1.73_M2}
GLUCOSE SERPL-MCNC: 111 MG/DL (ref 70–99)
HDLC SERPL-MCNC: 26 MG/DL
LDLC SERPL CALC-MCNC: 66 MG/DL
NONHDLC SERPL-MCNC: 125 MG/DL
POTASSIUM SERPL-SCNC: 4.9 MMOL/L (ref 3.4–5.3)
PROT SERPL-MCNC: 7.6 G/DL (ref 6.8–8.8)
SODIUM SERPL-SCNC: 138 MMOL/L (ref 133–144)
TRIGL SERPL-MCNC: 295 MG/DL

## 2020-12-08 PROCEDURE — U0003 INFECTIOUS AGENT DETECTION BY NUCLEIC ACID (DNA OR RNA); SEVERE ACUTE RESPIRATORY SYNDROME CORONAVIRUS 2 (SARS-COV-2) (CORONAVIRUS DISEASE [COVID-19]), AMPLIFIED PROBE TECHNIQUE, MAKING USE OF HIGH THROUGHPUT TECHNOLOGIES AS DESCRIBED BY CMS-2020-01-R: HCPCS | Performed by: INTERNAL MEDICINE

## 2020-12-08 PROCEDURE — 80061 LIPID PANEL: CPT | Performed by: INTERNAL MEDICINE

## 2020-12-08 PROCEDURE — 80053 COMPREHEN METABOLIC PANEL: CPT | Performed by: INTERNAL MEDICINE

## 2020-12-08 PROCEDURE — 36415 COLL VENOUS BLD VENIPUNCTURE: CPT | Performed by: INTERNAL MEDICINE

## 2020-12-08 NOTE — TELEPHONE ENCOUNTER
Pt came to  after labs, and is asking to have serology order added on so lab can run covid antibody test as well, please.  His blood / saliva have already been drawn.

## 2020-12-09 LAB
SARS-COV-2 RNA SPEC QL NAA+PROBE: NOT DETECTED
SPECIMEN SOURCE: NORMAL

## 2020-12-14 ENCOUNTER — OFFICE VISIT (OUTPATIENT)
Dept: PEDIATRICS | Facility: CLINIC | Age: 75
End: 2020-12-14
Payer: COMMERCIAL

## 2020-12-14 VITALS
BODY MASS INDEX: 31.39 KG/M2 | SYSTOLIC BLOOD PRESSURE: 118 MMHG | HEART RATE: 84 BPM | RESPIRATION RATE: 18 BRPM | TEMPERATURE: 98.6 F | WEIGHT: 200 LBS | HEIGHT: 67 IN | OXYGEN SATURATION: 99 % | DIASTOLIC BLOOD PRESSURE: 70 MMHG

## 2020-12-14 DIAGNOSIS — I10 HYPERTENSION GOAL BP (BLOOD PRESSURE) < 140/90: ICD-10-CM

## 2020-12-14 DIAGNOSIS — R35.0 FREQUENCY OF URINATION AND POLYURIA: ICD-10-CM

## 2020-12-14 DIAGNOSIS — Z00.00 ENCOUNTER FOR ANNUAL WELLNESS EXAM IN MEDICARE PATIENT: Primary | ICD-10-CM

## 2020-12-14 DIAGNOSIS — C61 MALIGNANT NEOPLASM OF PROSTATE (H): ICD-10-CM

## 2020-12-14 DIAGNOSIS — Z00.00 ENCOUNTER FOR MEDICARE ANNUAL WELLNESS EXAM: ICD-10-CM

## 2020-12-14 DIAGNOSIS — R06.83 SNORING: ICD-10-CM

## 2020-12-14 DIAGNOSIS — C20 RECTAL CANCER (H): ICD-10-CM

## 2020-12-14 DIAGNOSIS — Z12.5 ENCOUNTER FOR SCREENING FOR MALIGNANT NEOPLASM OF PROSTATE: ICD-10-CM

## 2020-12-14 DIAGNOSIS — M54.50 CHRONIC LEFT-SIDED LOW BACK PAIN WITHOUT SCIATICA: ICD-10-CM

## 2020-12-14 DIAGNOSIS — E78.5 HYPERLIPIDEMIA LDL GOAL <130: ICD-10-CM

## 2020-12-14 DIAGNOSIS — M81.0 AGE-RELATED OSTEOPOROSIS WITHOUT CURRENT PATHOLOGICAL FRACTURE: ICD-10-CM

## 2020-12-14 DIAGNOSIS — G89.29 CHRONIC LEFT-SIDED LOW BACK PAIN WITHOUT SCIATICA: ICD-10-CM

## 2020-12-14 DIAGNOSIS — R35.89 FREQUENCY OF URINATION AND POLYURIA: ICD-10-CM

## 2020-12-14 LAB
ALBUMIN UR-MCNC: NEGATIVE MG/DL
APPEARANCE UR: CLEAR
BASOPHILS # BLD AUTO: 0 10E9/L (ref 0–0.2)
BASOPHILS NFR BLD AUTO: 0.3 %
BILIRUB UR QL STRIP: NEGATIVE
COLOR UR AUTO: YELLOW
DIFFERENTIAL METHOD BLD: ABNORMAL
EOSINOPHIL # BLD AUTO: 0.2 10E9/L (ref 0–0.7)
EOSINOPHIL NFR BLD AUTO: 2.5 %
ERYTHROCYTE [DISTWIDTH] IN BLOOD BY AUTOMATED COUNT: 12.9 % (ref 10–15)
GLUCOSE UR STRIP-MCNC: NEGATIVE MG/DL
HCT VFR BLD AUTO: 38.1 % (ref 40–53)
HGB BLD-MCNC: 13.2 G/DL (ref 13.3–17.7)
HGB UR QL STRIP: NEGATIVE
KETONES UR STRIP-MCNC: NEGATIVE MG/DL
LEUKOCYTE ESTERASE UR QL STRIP: NEGATIVE
LYMPHOCYTES # BLD AUTO: 1.5 10E9/L (ref 0.8–5.3)
LYMPHOCYTES NFR BLD AUTO: 22.6 %
MCH RBC QN AUTO: 33 PG (ref 26.5–33)
MCHC RBC AUTO-ENTMCNC: 34.6 G/DL (ref 31.5–36.5)
MCV RBC AUTO: 95 FL (ref 78–100)
MONOCYTES # BLD AUTO: 0.6 10E9/L (ref 0–1.3)
MONOCYTES NFR BLD AUTO: 9 %
NEUTROPHILS # BLD AUTO: 4.4 10E9/L (ref 1.6–8.3)
NEUTROPHILS NFR BLD AUTO: 65.6 %
NITRATE UR QL: NEGATIVE
PH UR STRIP: 5 PH (ref 5–7)
PLATELET # BLD AUTO: 181 10E9/L (ref 150–450)
RBC # BLD AUTO: 4 10E12/L (ref 4.4–5.9)
SOURCE: NORMAL
SP GR UR STRIP: >1.03 (ref 1–1.03)
UROBILINOGEN UR STRIP-ACNC: 0.2 EU/DL (ref 0.2–1)
WBC # BLD AUTO: 6.8 10E9/L (ref 4–11)

## 2020-12-14 PROCEDURE — G0103 PSA SCREENING: HCPCS | Performed by: INTERNAL MEDICINE

## 2020-12-14 PROCEDURE — 86769 SARS-COV-2 COVID-19 ANTIBODY: CPT | Mod: 59 | Performed by: INTERNAL MEDICINE

## 2020-12-14 PROCEDURE — 99397 PER PM REEVAL EST PAT 65+ YR: CPT | Mod: 25 | Performed by: INTERNAL MEDICINE

## 2020-12-14 PROCEDURE — 85025 COMPLETE CBC W/AUTO DIFF WBC: CPT | Performed by: INTERNAL MEDICINE

## 2020-12-14 PROCEDURE — 90750 HZV VACC RECOMBINANT IM: CPT | Performed by: INTERNAL MEDICINE

## 2020-12-14 PROCEDURE — 86769 SARS-COV-2 COVID-19 ANTIBODY: CPT | Performed by: INTERNAL MEDICINE

## 2020-12-14 PROCEDURE — 90471 IMMUNIZATION ADMIN: CPT | Performed by: INTERNAL MEDICINE

## 2020-12-14 PROCEDURE — 81003 URINALYSIS AUTO W/O SCOPE: CPT | Performed by: INTERNAL MEDICINE

## 2020-12-14 PROCEDURE — 36415 COLL VENOUS BLD VENIPUNCTURE: CPT | Performed by: INTERNAL MEDICINE

## 2020-12-14 PROCEDURE — 99214 OFFICE O/P EST MOD 30 MIN: CPT | Mod: 25 | Performed by: INTERNAL MEDICINE

## 2020-12-14 ASSESSMENT — PATIENT HEALTH QUESTIONNAIRE - PHQ9: SUM OF ALL RESPONSES TO PHQ QUESTIONS 1-9: 0

## 2020-12-14 ASSESSMENT — MIFFLIN-ST. JEOR: SCORE: 1597.88

## 2020-12-14 ASSESSMENT — ACTIVITIES OF DAILY LIVING (ADL): CURRENT_FUNCTION: NO ASSISTANCE NEEDED

## 2020-12-14 NOTE — PROGRESS NOTES
"SUBJECTIVE:   Paco Eckert is a 75 year old male who presents for Preventive Visit.      Patient has been advised of split billing requirements and indicates understanding: Yes   Are you in the first 12 months of your Medicare coverage?  No    Healthy Habits:     In general, how would you rate your overall health?  Good    Frequency of exercise:  None    Duration of exercise:  Less than 15 minutes    Do you usually eat at least 4 servings of fruit and vegetables a day, include whole grains    & fiber and avoid regularly eating high fat or \"junk\" foods?  No    Taking medications regularly:  Yes    Barriers to taking medications:  None    Medication side effects:  None    Ability to successfully perform activities of daily living:  No assistance needed    Home Safety:  No safety concerns identified    Hearing Impairment:  No hearing concerns    In the past 6 months, have you been bothered by leaking of urine? Yes    In general, how would you rate your overall mental or emotional health?  Good      PHQ-2 Total Score: 0    Additional concerns today:  Yes    Do you feel safe in your environment? Yes    Have you ever done Advance Care Planning? (For example, a Health Directive, POLST, or a discussion with a medical provider or your loved ones about your wishes): No, advance care planning information given to patient to review.  Patient plans to discuss their wishes with loved ones or provider.        Fall risk  Fallen 2 or more times in the past year?: No  Any fall with injury in the past year?: No    Cognitive Screening   1) Repeat 3 items (Leader, Season, Table)    2) Clock draw: NORMAL  3) 3 item recall: Recalls 3 objects  Results: 0 items recalled: PROBABLE COGNITIVE IMPAIRMENT, **INFORM PROVIDER**    Mini-CogTM Copyright KATIE Aranda. Licensed by the author for use in [facility]; reprinted with permission (fara@.[facility]). All rights reserved.      Do you have sleep apnea, excessive snoring or daytime " "drowsiness?: yes    Reviewed and updated as needed this visit by clinical staff  Tobacco  Allergies  Meds   Med Hx  Surg Hx  Fam Hx  Soc Hx          Wife notes he stops breathing with sleep. Snores.     triglycerides were elevated on last blood test.   Weight is up 9 pounds.     Notes some \"grey specks in urine.\"  Not painful.  No blood.   Has history of prostate cancer; rectal cancer.    Would like COVID antibody testing.     Has herniated disk.  If walks, gets pain and sciatic symptoms.  In past had lidocaine injection, which helped a lot.    However, can't get repeat injection without MRI, no need pain management referral.              Reviewed and updated as needed this visit by Provider                Social History     Tobacco Use     Smoking status: Never Smoker     Smokeless tobacco: Never Used   Substance Use Topics     Alcohol use: Yes     Alcohol/week: 0.0 standard drinks     Comment: 10-12 drinks per week         Alcohol Use 11/14/2019   Prescreen: >3 drinks/day or >7 drinks/week? No   Prescreen: >3 drinks/day or >7 drinks/week? -             Current providers sharing in care for this patient include:   Patient Care Team:  Emeka Hale MD as PCP - General  Emeka Hale MD as Assigned PCP    The following health maintenance items are reviewed in Epic and correct as of today:  Health Maintenance   Topic Date Due     ANNUAL REVIEW OF HM ORDERS  1945     AORTIC ANEURYSM SCREENING (SYSTEM ASSIGNED)  05/05/2011     DEXA  10/29/2018     INFLUENZA VACCINE (1) 09/01/2020     FALL RISK ASSESSMENT  11/14/2020     ZOSTER IMMUNIZATION (3 of 3) 12/21/2020     MEDICARE ANNUAL WELLNESS VISIT  12/14/2021     LIPID  12/08/2025     ADVANCE CARE PLANNING  12/14/2025     DTAP/TDAP/TD IMMUNIZATION (3 - Td) 11/08/2026     COLORECTAL CANCER SCREENING  11/28/2028     HEPATITIS C SCREENING  Completed     PHQ-2  Completed     Pneumococcal Vaccine: 65+ Years  Completed     Pneumococcal Vaccine: Pediatrics (0 to 5 " Years) and At-Risk Patients (6 to 64 Years)  Aged Out     IPV IMMUNIZATION  Aged Out     MENINGITIS IMMUNIZATION  Aged Out     HEPATITIS B IMMUNIZATION  Aged Out     Lab work is in process  Labs reviewed in EPIC  BP Readings from Last 3 Encounters:   12/14/20 118/70   11/14/19 108/74   10/26/18 102/70    Wt Readings from Last 3 Encounters:   12/14/20 90.7 kg (200 lb)   11/14/19 86.8 kg (191 lb 4.8 oz)   10/26/18 92.5 kg (204 lb)                  Patient Active Problem List   Diagnosis     Malignant neoplasm of prostate (H)     Gouty arthropathy     Other psoriasis     Osteoporosis     Leukopenia     Frequency of urination and polyuria     HYPERLIPIDEMIA LDL GOAL <130     Impaired fasting glucose     Fatty liver     Hypertension goal BP (blood pressure) < 140/90     Rectal cancer (H)     Cervical pain     Past Surgical History:   Procedure Laterality Date     APPLY WOUND VAC N/A 10/21/2014    Procedure: APPLY WOUND VAC;  Surgeon: Mir Hernandez MD;  Location:  OR     COLONOSCOPY N/A 10/10/2014    Procedure: COMBINED COLONOSCOPY, SINGLE BIOPSY/POLYPECTOMY BY BIOPSY;  Surgeon: Mir Hernandez MD;  Location:  GI     COLOSTOMY N/A 10/21/2014    Procedure: COLOSTOMY;  Surgeon: Mir Hernandez MD;  Location:  OR     ORTHOPEDIC SURGERY Bilateral 2000 ?    rotater repair both     ORTHOPEDIC SURGERY Right     ankle      RESECTION ABDOMINAL PERINEAL N/A 10/21/2014    Procedure: RESECTION ABDOMINAL PERINEAL;  Surgeon: Mir Hernandez MD;  Location:  OR       Social History     Tobacco Use     Smoking status: Never Smoker     Smokeless tobacco: Never Used   Substance Use Topics     Alcohol use: Yes     Alcohol/week: 0.0 standard drinks     Comment: 10-12 drinks per week     Family History   Problem Relation Age of Onset     Diabetes Brother      Diabetes Sister      Hypertension Sister      Hypertension Brother          Current Outpatient Medications   Medication Sig Dispense Refill     lisinopril (PRINIVIL/ZESTRIL)  "10 MG tablet Take 1 tablet (10 mg) by mouth daily 90 tablet 3     loratadine-pseudoePHEDrine (CLARITIN-D 24-HOUR)  MG per 24 hr tablet Take 1 tablet by mouth daily       montelukast (SINGULAIR) 10 MG tablet TAKE 1 TABLET(10 MG) BY MOUTH AT BEDTIME 90 tablet 0     simvastatin (ZOCOR) 40 MG tablet Take 1 tablet (40 mg) by mouth At Bedtime 90 tablet 3     tadalafil (CIALIS) 5 MG tablet Take 1 tablet (5 mg) by mouth every 24 hours 30 tablet 2     Allergies   Allergen Reactions     Seasonal Allergies      .      Review of Systems  CONSTITUTIONAL: NEGATIVE for fever, chills, change in weight  INTEGUMENTARY/SKIN: NEGATIVE for worrisome rashes, moles or lesions  EYES: NEGATIVE for vision changes or irritation  ENT/MOUTH: NEGATIVE for ear, mouth and throat problems  RESP: NEGATIVE for significant cough or SOB  BREAST: NEGATIVE for masses, tenderness or discharge  CV: NEGATIVE for chest pain, palpitations or peripheral edema  GI: NEGATIVE for nausea, abdominal pain, heartburn, or change in bowel habits  : NEGATIVE for frequency, dysuria, or hematuria  MUSCULOSKELETAL: NEGATIVE for significant arthralgias or myalgia  NEURO: NEGATIVE for weakness, dizziness or paresthesias  ENDOCRINE: NEGATIVE for temperature intolerance, skin/hair changes  HEME: NEGATIVE for bleeding problems  PSYCHIATRIC: NEGATIVE for changes in mood or affect    OBJECTIVE:   /70 (BP Location: Right arm, Patient Position: Sitting, Cuff Size: Adult Regular)   Pulse 84   Temp 98.6  F (37  C) (Tympanic)   Resp 18   Ht 1.689 m (5' 6.5\")   Wt 90.7 kg (200 lb)   SpO2 99%   BMI 31.80 kg/m   Estimated body mass index is 31.8 kg/m  as calculated from the following:    Height as of this encounter: 1.689 m (5' 6.5\").    Weight as of this encounter: 90.7 kg (200 lb).  Physical Exam  GENERAL: healthy, alert and no distress  EYES: Eyes grossly normal to inspection, PERRL and conjunctivae and sclerae normal  HENT: ear canals and TM's normal, nose and " "mouth without ulcers or lesions  NECK: no adenopathy, no asymmetry, masses, or scars and thyroid normal to palpation  RESP: lungs clear to auscultation - no rales, rhonchi or wheezes  CV: regular rate and rhythm, normal S1 S2, no S3 or S4, no murmur, click or rub, no peripheral edema and peripheral pulses strong  ABDOMEN: soft, nontender, no hepatosplenomegaly, no masses and bowel sounds normal   (male): normal male genitalia without lesions or urethral discharge, no hernia  MS: no gross musculoskeletal defects noted, no edema  SKIN: no suspicious lesions or rashes  NEURO: Normal strength and tone, mentation intact and speech normal  PSYCH: mentation appears normal, affect normal/bright    Diagnostic Test Results:  Labs reviewed in Epic    ASSESSMENT / PLAN:   1. Encounter for annual wellness exam in Medicare patient  Discussed diet, exercise, testicular self exam, blood pressure, cholesterol, and need for cancer surveillance at appropriate ages.   - DEXA HIP/PELVIS/SPINE - Future; Future  - *UA reflex to Microscopic and Culture ([place] and [facility] (except [place] and [place])  - COVID-19 Virus (Coronavirus) Antibody & Titer Reflex  - CBC with platelets and differential    2. Malignant neoplasm of prostate (H)  Given his \"specs\" in urine, will repeat prostate specific antigen (PSA).   - PSA, screen    3. Frequency of urination and polyuria  urinalysis today.     4. Hyperlipidemia LDL goal <130  Continue statin.     5. Hypertension goal BP (blood pressure) < 140/90  At goal.     6. Rectal cancer (H)  Colostomy in place.  Colonoscopy up to date.     7. Chronic left-sided low back pain without sciatica  Referred for possible epidural steroid injection;   - PAIN MANAGEMENT REFERRAL; Future    8. Snoring  Advised home sleep study.   - SLEEP EVALUATION & MANAGEMENT REFERRAL - ADULT -[facility] [place]  [phone] (Age 18 and up); Future    9. Age-related osteoporosis without current " "pathological fracture   Patient in need of follow up DEXA scan (bone density test for osteoporosis), given past borderline osteopenia.   - DEXA HIP/PELVIS/SPINE - Future; Future    10. Encounter for screening for malignant neoplasm of prostate     - PSA, screen    11. Encounter for Medicare annual wellness exam  Discussed diet, exercise, testicular self exam, blood pressure, cholesterol, and need for cancer surveillance at appropriate ages.       Patient has been advised of split billing requirements and indicates understanding: Yes  COUNSELING:  Reviewed preventive health counseling, as reflected in patient instructions       Regular exercise       Healthy diet/nutrition       Vision screening       Hearing screening       Colon cancer screening       Prostate cancer screening    Estimated body mass index is 31.8 kg/m  as calculated from the following:    Height as of this encounter: 1.689 m (5' 6.5\").    Weight as of this encounter: 90.7 kg (200 lb).    Weight management plan: Patient was referred to their PCP to discuss a diet and exercise plan.    He reports that he has never smoked. He has never used smokeless tobacco.      Appropriate preventive services were discussed with this patient, including applicable screening as appropriate for cardiovascular disease, diabetes, osteopenia/osteoporosis, and glaucoma.  As appropriate for age/gender, discussed screening for colorectal cancer, prostate cancer, breast cancer, and cervical cancer. Checklist reviewing preventive services available has been given to the patient.    Reviewed patients plan of care and provided an AVS. The Basic Care Plan (routine screening as documented in Health Maintenance) for Paco meets the Care Plan requirement. This Care Plan has been established and reviewed with the Patient.    Counseling Resources:  ATP IV Guidelines  Pooled Cohorts Equation Calculator  Breast Cancer Risk Calculator  Breast Cancer: Medication to Reduce Risk  FRAX " Risk Assessment  ICSI Preventive Guidelines  Dietary Guidelines for Americans, 2010  USDA's MyPlate  ASA Prophylaxis  Lung CA Screening    Emeka Hale MD  [facility]    Identified Health Risks:    He is at risk for lack of exercise and has been provided with information to increase physical activity for the benefit of his well-being.  The patient was counseled and encouraged to consider modifying their diet and eating habits. He was provided with information on recommended healthy diet options.  Information on urinary incontinence and treatment options given to patient.

## 2020-12-14 NOTE — PATIENT INSTRUCTIONS
"Lab work downstairs today.  Directions:  As you walk through the first floor, you'll see (on the right) first the pharmacy, then some bathrooms, then the \"Lab and Imaging\" area. Give them your name at the window there and wait for them to call you.    Call for a sleep study.    They will call you for a DEXA scan (bone density test for osteoporosis).    Colonoscopy due in 2023.    Shingrix #2 today.  Patient Education   Personalized Prevention Plan  You are due for the preventive services outlined below.  Your care team is available to assist you in scheduling these services.  If you have already completed any of these items, please share that information with your care team to update in your medical record.  Health Maintenance Due   Topic Date Due     AORTIC ANEURYSM SCREENING (SYSTEM ASSIGNED)  05/05/2011     Osteoporosis Screening  10/29/2018     FALL RISK ASSESSMENT  11/14/2020       Exercise for a Healthier Heart     Exercise with a friend. When activity is fun, you're more likely to stick with it.   You may wonder how you can improve the health of your heart. If you re thinking about exercise, you re on the right track. You don t need to become an athlete. But you do need a certain amount of brisk exercise to help strengthen your heart. If you have been diagnosed with a heart condition, your healthcare provider may advise exercise to help stabilize your condition. To help make exercise a habit, choose safe, fun activities.   Before you start  Check with your healthcare provider before starting an exercise program. This is especially important if you have not been active for a while. It's also important if you have a long-term (chronic) health problem such as heart disease, diabetes, or obesity. Or if you are at high risk for having these problems.   Why exercise?  Exercising regularly offers many healthy rewards. It can help you do all of the following:    Improve your blood cholesterol level to help prevent " further heart trouble    Lower your blood pressure to help prevent a stroke or heart attack    Control diabetes, or reduce your risk of getting this disease    Improve your heart and lung function    Reach and stay at a healthy weight    Make your muscles stronger so you can stay active    Prevent falls and fractures by slowing the loss of bone mass (osteoporosis)    Manage stress better    Reduce your blood pressure    Improve your sense of self and your body image  Exercise tips      Ease into your routine. Set small goals. Then build on them. If you are not sure what your activity level should be, talk with your healthcare provider first before starting an exercise routine.    Exercise on most days. Aim for a total of 150 minutes (2 hours and 30 minutes) or more of moderate-intensity aerobic activity each week. Or 75 minutes (1 hour and 15 minutes) or more of vigorous-intensity aerobic activity each week. Or try for a combination of both. Moderate activity means that you breathe heavier and your heart rate increases but you can still talk. Think about doing 40 minutes of moderate exercise, 3 to 4 times a week. For best results, activity should last for about 40 minutes to lower blood pressure and cholesterol. It's OK to work up to the 40-minute period over time. Examples of moderate-intensity activity are walking 1 mile in 15 minutes. Or doing 30 to 45 minutes of yard work.    Step up your daily activity level.  Along with your exercise program, try being more active the whole day. Walk instead of drive. Or park further away so that you take more steps each day. Do more household tasks or yard work. You may not be able to meet the advised mount of physical activity. But doing some moderate- or vigorous-intensity aerobic activity can help reduce your risk for heart disease. Your healthcare provider can help you figure out what is best for you.    Choose 1 or more activities you enjoy.  Walking is one of the  easiest things you can do. You can also try swimming, riding a bike, dancing, or taking an exercise class.    When to call your healthcare provider  Call your healthcare provider if you have any of these:     Chest pain or feel dizzy or lightheaded    Burning, tightness, pressure, or heaviness in your chest, neck, shoulders, back, or arms    Abnormal shortness of breath    More joint or muscle pain    A very fast or irregular heartbeat (palpitations)  [employer] last reviewed this educational content on 7/1/2019 2000-2020 [employer]. [address] [place] [id number]. All rights reserved. This information is not intended as a substitute for professional medical care. Always follow your healthcare professional's instructions.          Understanding [employer] MyPlate  The USDA (U.S. Department of Agriculture) has guidelines to help you make healthy food choices. These are called MyPlate. MyPlate shows the food groups that make up healthy meals using the image of a place setting. Before you eat, think about the healthiest choices for what to put onto your plate or into your cup or bowl. To learn more about building a healthy plate, visit www.choosemyplate.gov.    The food groups    Fruits. Any fruit or 100% fruit juice counts as part of the Fruit Group. Fruits may be fresh, canned, frozen, or dried, and may be whole, cut-up, or pureed. Make half your plate fruits and vegetables.    Vegetables. Any vegetable or 100% vegetable juice counts as a member of the Vegetable Group. Vegetables may be fresh, frozen, canned, or dried. They can be served raw or cooked and may be whole, cut-up, or mashed. Make half your plate fruits and vegetables.    Grains. All foods made from grains are part of the Grains Group. These include wheat, rice, oats, cornmeal, and barley such as bread, pasta, oatmeal, cereal, tortillas, and grits. Grains should be no more than a quarter of your plate. At least half of your grains  should be whole grains.    Protein. This group includes meat, poultry, seafood, beans and peas, eggs, processed soy products (like tofu), nuts (including nut butters), and seeds. Make protein choices no more than a quarter of your plate. Meat and poultry choices should be lean or low fat.    Dairy. All fluid milk products and foods made from milk that contain calcium, like yogurt and cheese, are part of the Dairy Group. (Foods that have little calcium, such as cream, butter, and cream cheese, are not part of the group.) Most dairy choices should be low-fat or fat-free.    Oils. These are fats that are liquid at room temperature. They include canola, corn, olive, soybean, and sunflower oil. Foods that are mainly oil include mayonnaise, certain salad dressings, and soft margarines. You should have only 5 to 7 teaspoons of oils a day. You probably already get this much from the food you eat.  [employer] last reviewed this educational content on 8/1/2017 2000-2020 [employer]. [address] [place] [address]. All rights reserved. This information is not intended as a substitute for professional medical care. Always follow your healthcare professional's instructions.          Urinary Incontinence (Male)    Urinary incontinence means not being able to control the release of urine from the bladder.   Causes  Common causes of urinary incontinence in men include:    Infection    Certain medicines    Aging    Poor pelvic muscle tone    Bladder spasms    Obesity    Trouble urinating and fully emptying the bladder (urinary retention)  Other things that can cause incontinence are:     Nervous system diseases    Diabetes    Sleep apnea    Urinary tract infections    Prostate surgery    Pelvic injury  Constipation and smoking have also been identified as risk factors.   Symptoms    Urge incontinence (overactive bladder). This is a sudden urge to urinate. It occurs even though there may not be much urine in  the bladder. The need to urinate often during the night is common. It's due to bladder spasms.    Stress incontinence. This is urine leakage that you can't control. It can occur with sneezing, coughing, and other actions that put stress on the bladder.    Treatment  Treatment depends on what is causing the condition. Bladder infections are treated with antibiotics. Urinary retention is treated with a bladder catheter.   Home care  Follow these guidelines when caring for yourself at home:    Don't have any foods and drinks that may irritate the bladder. This includes:  ? Chocolate  ? Alcohol  ? Caffeine  ? Carbonated drinks  ? Acidic fruits and juices    Limit fluids to 6 to 8 cups a day.    Lose weight if you are overweight. This will reduce your symptoms.    If advised, do regular pelvic muscle-strengthening exercises such as Kegel exercises.    If needed, wear absorbent pads to catch urine. Change the pads often. This is for good hygiene and to prevent skin and bladder infections.    Bathe daily for good hygiene.    If an antibiotic was prescribed to treat a bladder infection, take it until it's finished. Keep taking it even if you are feeling better. This is to make sure your infection has cleared.    If a catheter was left in place, keep bacteria from getting into the collection bag. Don't disconnect the catheter from the collection bag.    Use a leg band to secure the catheter drainage tube, so it does not pull on the catheter. Drain the collection bag when it becomes full. To do this, use the drain spout at the bottom of the bag. Don't disconnect the bag from the catheter.    Don't pull on or try to remove a catheter. The catheter must be removed by a healthcare provider.    If you smoke, stop. Ask your provider for help if you can't do this on your own.  Follow-up care  Follow up with your healthcare provider, or as advised.  When to get medical advice  Call your healthcare provider right away if any of these  occur:    Fever over 100.4 F (38 C), or as directed by your provider    Bladder pain or fullness    Belly swelling, nausea, or vomiting    Back pain    Weakness, dizziness, or fainting    If a catheter was left in place, return if:  ? The catheter falls out  ? The catheter stops draining for 6 hours  ? Your urine gets cloudy or smells bad  [employer] last reviewed this educational content on 1/1/2020 2000-2020 [employer]. [address] [place] [address]. All rights reserved. This information is not intended as a substitute for professional medical care. Always follow your healthcare professional's instructions.

## 2020-12-15 LAB — PSA SERPL-ACNC: <0.01 UG/L (ref 0–4)

## 2020-12-16 ENCOUNTER — VIRTUAL VISIT (OUTPATIENT)
Dept: PALLIATIVE MEDICINE | Facility: CLINIC | Age: 75
End: 2020-12-16
Attending: INTERNAL MEDICINE
Payer: COMMERCIAL

## 2020-12-16 DIAGNOSIS — M47.816 SPONDYLOSIS OF LUMBAR REGION WITHOUT MYELOPATHY OR RADICULOPATHY: ICD-10-CM

## 2020-12-16 DIAGNOSIS — M48.061 SPINAL STENOSIS OF LUMBAR REGION WITHOUT NEUROGENIC CLAUDICATION: Primary | ICD-10-CM

## 2020-12-16 LAB
COVID-19 SPIKE RBD ABY TITER: NORMAL
COVID-19 SPIKE RBD ABY: POSITIVE

## 2020-12-16 PROCEDURE — 99214 OFFICE O/P EST MOD 30 MIN: CPT | Mod: 95 | Performed by: PHYSICAL MEDICINE & REHABILITATION

## 2020-12-16 ASSESSMENT — PAIN SCALES - GENERAL: PAINLEVEL: EXTREME PAIN (8)

## 2020-12-16 NOTE — PROGRESS NOTES
"Paco Eckert is a 75 year old male who is being evaluated via a billable telephone visit.      The patient has been notified of following:     \"This telephone visit will be conducted via a call between you and your physician/provider. We have found that certain health care needs can be provided without the need for a physical exam.  This service lets us provide the care you need with a short phone conversation.  If a prescription is necessary we can send it directly to your pharmacy.  If lab work is needed we can place an order for that and you can then stop by our lab to have the test done at a later time.    Telephone visits are billed at different rates depending on your insurance coverage. During this emergency period, for some insurers they may be billed the same as an in-person visit.  Please reach out to your insurance provider with any questions.    If during the course of the call the physician/provider feels a telephone visit is not appropriate, you will not be charged for this service.\"    Patient has given verbal consent for Telephone visit?  Yes    What phone number would you like to be contacted at? [phone]      How would you like to obtain your AVS? Brittani Kirkpatrick CMA    Phone call duration: 15 minutes    [facility] Interventional Consultation      This patient is being seen in consultation at the request of his provider Dr. Emeka Hale.    Primary Care Provider is Emeka Hale.    The current opiate pain medications are being prescribed by: NA    Please see the [facility] health questionnaire which the patient completed and reviewed with me in detail    Chief Complaint:  Back pain    History of Present Illness:  Paco Eckert is a 75 year old male with history of hx of rectal cancer with colostomy in place and prostate cancer, HLD, HTN, osteoporosis, gout who presents for injection recommendations for chronic back pain. He " was initially seen for this by [facility] in 2016. He reports that the pain now is the same pain he had back then. He states that the pain is located in the bilateral low back. One side is not worse than the other. It is intermittent. He has no pain when sitting. The pain is most bothersome when he is standing or walking. He can walk for about 5 minutes before he has to rest due to pain. He denies having any radicular symptoms. However he does report having some right groin pain. The pain is dull in quality. Pain ranges from 0/10 to 9/10 in severity.     The patient otherwise denies bowel or bladder incontinence, parasthesias, weakness, saddle anesthesia, unintentional weight loss, or fever/chills/sweats.     He had 2 injections in 2017 which provided 4-6 months of pain relief.     Past Pain Treatments:   Right L4-5 Transforaminal epidural steroid injection x 2 on 1/4/2017 and 12/27/2017 by Dr. Abelino SORIANO for 4-6 months  PT [place]  [place]     Current Pain Relevant Medications:    None    PAST MEDICAL HISTORY:   Past Medical History:   Diagnosis Date     Fatty liver 2/9/2011     Gouty arthropathy      Hypercholesteremia 12/15/2009     Hypertension      Impaired fasting glucose 7/26/2010     Leukopenia 11/7/2008    WBC 3.6 in 11/08.     Malignant neoplasm of prostate (H) 2000    on casodex and Lupron     Osteoporosis, unspecified     assoc with Lupron     Other psoriasis        ALLERGIES:  Allergies   Allergen Reactions     Seasonal Allergies        MEDICATIONS:  Current Outpatient Medications   Medication Sig Dispense Refill     lisinopril (PRINIVIL/ZESTRIL) 10 MG tablet Take 1 tablet (10 mg) by mouth daily 90 tablet 3     loratadine-pseudoePHEDrine (CLARITIN-D 24-HOUR)  MG per 24 hr tablet Take 1 tablet by mouth daily       montelukast (SINGULAIR) 10 MG tablet TAKE 1 TABLET(10 MG) BY MOUTH AT BEDTIME 90 tablet 0     simvastatin (ZOCOR) 40 MG tablet Take 1 tablet (40 mg) by mouth At Bedtime 90 tablet 3      tadalafil (CIALIS) 5 MG tablet Take 1 tablet (5 mg) by mouth every 24 hours 30 tablet 2     REVIEW OF SYSTEMS:   Constitutional:  Negative  Eyes/Head: Negative  Ears/Nose/Throat: Negative  Allergy/Immune: Negative  Skin:Negative  Hematologic/Lymphatic/Immunologic:Negative  Respiratory: Negative  Cardiovascular: Negative  Gastrointestinal: Negative  Endocrine: Negative  Musculoskeletal: Back pain  Urinary:  Negative   Any bowel or bladder incontinence: Denies   Neurologic: Negative  Psychiatric: Negative    CURRENT FAMILY/SOCIAL SITUATION:  Living situation: Lives in [place], [place].     SUBSTANCE USE:  Any illicit drug use: none  EtOH use: occassisonal  Tobacco use: none    PHYSICAL EXAM  Unable to complete exam since this is a telephone visit.     Previous Diagnostic Tests:   Lumbar MRI was done on 11/15/2016 at [facility] in  showed:          Other Testing (labs, diagnostics) reviewed:   Labs:creatinine 0.97    ASSESSMENT:   Paco Eckert is a 75 year old male with a past medical history significant for hx of rectal cancer with colostomy in place and prostate cancer, HLD, HTN, osteoporosis, gout who presents for interventional recommendations for chronic back pain.     1. Chronic back pain: Patient reports having chronic bilateral low back pain which is mainly axial in nature. He denies any radicular type symptoms into the legs but does report having some groin pain in the right. Pain is intermittent. No pain with sitting. Pain is aggravated with standing and walking. MRI of lumbar spine from 2016 shows disc bulging at multiple levels, moderate to severe central canal stenosis at L4-5 and L3-4. Etiology of pain is likely multifactorial due to lumbar spondylosis and lumbar stenosis.     Recommendations:  Before doing an epidural injection he will need to get an updated lumbar MRI. Last one was completed little over 4 years ago. Order placed for this. Will call patient with results and will place order for  injection after results are reviewed. He previously had a right L4-5 Transforaminal epidural steroid injection which was helpful. However, he is reporting bilateral low back pain with one side not being more painful that the other. He does not have any clear radicular symptoms into the lower extremities. Will likely proceed with an interlaminar approach rather than a transforaminal approach.     Sully Reese MD  [facility] [place]

## 2020-12-16 NOTE — PATIENT INSTRUCTIONS
1. Order placed for an updated lumbar MRI. You can call [phone] to schedule if you do not want to wait for a phone call. This is the number for [facility] Imaging Scheduling.   2. I will call you with the results of the MRI and place the order for injection at that time.     Take care,  Sully Reese MD  [facility] Pain Management     ----------------------------------------------------------------  Clinic Number:  [phone]     Call with any questions about your care and for scheduling assistance.     Calls are returned Monday through Friday between 8 AM and 4:30 PM. We usually get back to you within 2 business days depending on the issue/request.    If we are prescribing your medications:    For opioid medication refills, call the clinic or send a [employer] message 7 days in advance.  Please include:    Name of requested medication    Name of the pharmacy.    For non-opioid medications, call your pharmacy directly to request a refill. Please allow 3-4 days to be processed.     Per [place] State Law:    All controlled substance prescriptions must be filled within 30 days of being written.      For those controlled substances allowing refills, pickup must occur within 30 days of last fill.      We believe regular attendance is key to your success in our program!      Any time you are unable to keep your appointment we ask that you call us at least 24 hours in advance to cancel.This will allow us to offer the appointment time to another patient.     Multiple missed appointments may lead to dismissal from the clinic.

## 2020-12-17 ENCOUNTER — MYC MEDICAL ADVICE (OUTPATIENT)
Dept: PALLIATIVE MEDICINE | Facility: CLINIC | Age: 75
End: 2020-12-17

## 2020-12-17 DIAGNOSIS — M48.061 SPINAL STENOSIS OF LUMBAR REGION WITHOUT NEUROGENIC CLAUDICATION: Primary | ICD-10-CM

## 2020-12-17 NOTE — TELEPHONE ENCOUNTER
Routing to provider for MRI order. Once signed please route to MA pool for [facility] [place] [facility]    Per OV:  1. Order placed for an updated lumbar MRI. You can call [phone] to schedule if you do not want to wait for a phone call. This is the number for [facility] Imaging Scheduling.     ----------------Mychart Below from pt---------------     could you send [facility] [place]. I am clostafobic and need open mri holli [email or web address] let me know        --------------Mychart below response to pt----------------    Fidelina Antonio,     We can absolutely send your referral for the MRI over to them. I will have that done today for you. Thanks    Evelina AYALAN, RN Care Coordinator  [facility]  Pain Management

## 2020-12-17 NOTE — TELEPHONE ENCOUNTER
External MRI order placed. Will route to MA pool to [facility] [place].     Sully Reese MD  [facility] Pain Management

## 2020-12-18 NOTE — TELEPHONE ENCOUNTER
Patient notified that orders were sent and will call to schedule.    Nae Kirkpatrick [facility]   Pain Management [place]

## 2020-12-18 NOTE — TELEPHONE ENCOUNTER
[facility]    Nae Kirkpatrick [facility]   Pain Management [place]

## 2020-12-18 NOTE — TELEPHONE ENCOUNTER
Routing to MA pool to fax orders ASAP      Mychart below sent from separate encounter    Darvinbasilia Antonio,     Yes, I will make sure that they get your orders, I am not sure why they did not go through yesterday for you. Thanks and have a great weekend.     Evelina AYALAN, RN Care Coordinator  [facility]  Pain [facility]

## 2020-12-18 NOTE — TELEPHONE ENCOUNTER
Fidelina Antonio,     Yes, I will make sure that they get your orders, I am not sure why they did not go through yesterday for you. Thanks and have a great weekend.     Evelina CONTRERAS, RN Care Coordinator  [facility]  Pain [facility]

## 2020-12-23 ENCOUNTER — TRANSFERRED RECORDS (OUTPATIENT)
Dept: HEALTH INFORMATION MANAGEMENT | Facility: CLINIC | Age: 75
End: 2020-12-23

## 2020-12-23 ENCOUNTER — MYC MEDICAL ADVICE (OUTPATIENT)
Dept: PALLIATIVE MEDICINE | Facility: CLINIC | Age: 75
End: 2020-12-23

## 2020-12-23 DIAGNOSIS — M48.061 SPINAL STENOSIS OF LUMBAR REGION WITHOUT NEUROGENIC CLAUDICATION: Primary | ICD-10-CM

## 2020-12-23 NOTE — TELEPHONE ENCOUNTER
Xention message from patient on 12/23/2020 at 1224:  dr marge,I had my mri today, who do I cntact to get my injection,i  will be going to [place]  jan,12th I mer like to get the injection asap,thanks Paco ferrara  ____________________________________    Mychart message sent to patient:  Good Afternoon Paco,     I will forward your message to Dr Reese. Once she has reviewed the MRI results, she will place the order for the epidural injection.  Our scheduling department will call you to get this set up.      Take Care,    Slime Peter RN  Care Coordinator  [facility] Pain Management   ___________________________________________    Routing to provider for epidural injection orders

## 2020-12-24 NOTE — TELEPHONE ENCOUNTER
[employer] message sent to patient:  Good Morning Dr Mary Ann Antonio did review your MRI results and placed the order for the epidural injection.  Our scheduling department will give you a call to get this set up. If you do not hear from them soon, please call [phone]. Hope you have a good day.    Take Care,    Slime Peter RN  Care Coordinator  [facility] Pain Management

## 2020-12-24 NOTE — TELEPHONE ENCOUNTER
[facility]. Order placed for an L3-4 ILESI.     Sully Reese MD  [facility] Pain Management

## 2020-12-28 ENCOUNTER — TELEPHONE (OUTPATIENT)
Dept: PALLIATIVE MEDICINE | Facility: CLINIC | Age: 75
End: 2020-12-28

## 2020-12-28 NOTE — TELEPHONE ENCOUNTER
Screening Questions for Radiology Injections:    Injection to be done at which interventional clinic site? [facility]    If [facility] location, tell patient that this procedure requires a COVID-19 lab test be done within 4 days of the procedure. Would you still like to move forward with scheduling the procedure?  Not Applicable   If YES, let patient know that someone will call them to schedule the COVID-19 test and that they will only receive a call back if the result is positive. Route to nursing to enter order.     Instruct patient to arrive as directed prior to the scheduled appointment time:    [place] minutes before      [place]: 30 minutes before; if IV needed 1 hour before     Procedure ordered by [facility]    Procedure ordered? L3-4 ILESI      Transforaminal Cervical WALTER - no pain provider currently performing    As a reminder, receiving steroids can decrease your body's ability to fight infection.   Would you still like to move forward with scheduling the injection?  Yes    What insurance would patient like us to bill for this procedure? BCBS/ Medicare - effective 2021      Worker's comp or MVA (motor vehicle accident) -Any injection DO NOT SCHEDULE and route to Mercedes Foster.      [employer] insurance - For SI joint injections, DO NOT SCHEDULE and route Mercedes Foster.       ALL BCBS, [employer] and [employer]-Route to Mercedes for review DO NOT SCHEDULE      IF SCHEDULING IN [place] AND NEEDS A PA, IT IS OKAY TO SCHEDULE. [place] HANDLES THEIR OWN PA'S AFTER THE PATIENT IS SCHEDULED. PLEASE SCHEDULE AT LEAST 1 WEEK OUT SO A PA CAN BE OBTAINED.    Any chance of pregnancy? Not Applicable   If YES, do NOT schedule and route to  pool    Is an  needed? No     Patient has a drive home? (mandatory) YES: INFORMED    Is patient taking any blood thinners (i.e. plavix, coumadin, jantoven, warfarin, heparin, pradaxa or dabigatran, etc)? No   If hold needed, do NOT schedule,  route to RN pool     Is patient taking any aspirin products (includes Excedrin and Fiorinal)? No     If more than 325mg/day, OK to schedule; Instruct pt to decrease to less than 325 mg for 7 days AND route to RN pool    For CERVICAL procedures, hold all aspirin products for 6 days.     Tell pt that if aspirin product is not held for 6 days, the procedure WILL BE cancelled.      Does the patient have a bleeding or clotting disorder? No     If YES, okay to schedule AND route to RN nurse pool    For any patients with platelet count <100, must be forwarded to provider    Is patient diabetic?  No  If YES, instruct them to bring their glucometer.    Does patient have an active infection or treated for one within the past week? No     Is patient currently taking any antibiotics?  No     For patients on chronic, preventative, or prophylactic antibiotics, procedures may be scheduled.     For patients on antibiotics for active or recent infection:antibiotic course must have been completed for 4 days    Is patient currently taking any steroid medications? (i.e. Prednisone, Medrol)  No     For patients on steroid medications, course must have been completed for 4 days    Is patient actively being treated for cancer or immunocompromised? No  If YES, do NOT schedule and route to RN pool     Are you able to get on and off an exam table with minimal or no assistance? Yes  If NO, do NOT schedule and route to RN pool    Are you able to roll over and lay on your stomach with minimal or no assistance? Yes  If NO, do NOT schedule and route to RN pool     Any allergies to contrast dye, iodine, shellfish, or numbing and steroid medications? No  If YES, route to RN pool AND add allergy information to appointment notes    Allergies: Seasonal allergies      Has the patient had a flu shot or any other vaccinations within 7 days before or after the procedure.  No     Does patient have an MRI/CT?  YES: 2020  Check Procedure Scheduling Grid to  see if required.      Was the MRI done within the last 3 years?  Yes    If yes, where was the MRI done i.e.[facility], [facility], [facility], [facility] etc? [facility]      If no, do not schedule and route to RN pool    If MRI was not done at [facility], [facility] or [facility] do NOT schedule and route to RN pool.      If pt has an imaging disc, the injection MAY be scheduled but pt has to bring disc to appt.     If they show up without the disc the injection cannot be done    Procedure Specific Instructions:      If celiac plexus block, informed patient NPO for 6 hours and that it is okay to take medications with sips of water, especially blood pressure medications  Not Applicable         If this is for a cervical procedure, informed patient that aspirin needs to be held for 6 days.   Not Applicable      If IV needed:    Do not schedule procedures requiring IV placement in the first appointment of the day or first appointment after lunch. Do NOT schedule at 0745, 0815 or 1245.     Instructed pt to arrive 30 minutes early for IV start if required. (Check Procedure Scheduling Grid)  Not Applicable    Reminders:      If you are started on any steroids or antibiotics between now and your appointment, you must contact us because the procedure may need to be cancelled.  Yes      For all procedures except radiofrequency ablations (RFAs) and spinal cord stimulator (SCS) trials, informed patient:    IV sedation is not provided for this procedure.  If you feel that an oral anti-anxiety medication is needed, you can discuss this further with your referring provider or primary care provider.  The Pain Clinic provider will discuss specifics of what the procedure includes at your appointment.  Most procedures last 10-20 minutes.  We use numbing medications to help with any discomfort during the procedure.  Not Applicable      For patients 85 or older we recommend having an adult stay w/ them for the remainder of the  day.       Does the patient have any questions?  NO  Vickie Gupta  [facility]

## 2020-12-28 NOTE — TELEPHONE ENCOUNTER
Last Read in [facility]   12/24/2020  9:54 AM by Paco Eckert  ------------  No injection scheduled yet. Our  staff talked with patient this morning. We need to get a PA from [facility] before scheduling. Please see procedure encounter for more information.     ULCY MarkN, RN-BC  Patient Care Supervisor  [facility] [place]

## 2020-12-30 ENCOUNTER — TELEPHONE (OUTPATIENT)
Dept: SLEEP MEDICINE | Facility: CLINIC | Age: 75
End: 2020-12-30

## 2020-12-30 ENCOUNTER — VIRTUAL VISIT (OUTPATIENT)
Dept: SLEEP MEDICINE | Facility: CLINIC | Age: 75
End: 2020-12-30
Payer: COMMERCIAL

## 2020-12-30 DIAGNOSIS — R06.81 WITNESSED EPISODE OF APNEA: Primary | ICD-10-CM

## 2020-12-30 DIAGNOSIS — R06.83 SNORING: ICD-10-CM

## 2020-12-30 DIAGNOSIS — R40.0 INTERMITTENT DROWSINESS: ICD-10-CM

## 2020-12-30 PROCEDURE — 99201 PR OFFICE/OUTPT VISIT, NEW, LEVEL I: CPT | Mod: 95 | Performed by: INTERNAL MEDICINE

## 2020-12-30 NOTE — PROGRESS NOTES
"Paco Eckert is a 75 year old male who is being evaluated via a billable telephone visit.      The patient has been notified of following:     \"This telephone visit will be conducted via a call between you and your physician/provider. We have found that certain health care needs can be provided without the need for a physical exam.  This service lets us provide the care you need with a short phone conversation.  If a prescription is necessary we can send it directly to your pharmacy.  If lab work is needed we can place an order for that and you can then stop by our lab to have the test done at a later time.    Telephone visits are billed at different rates depending on your insurance coverage. During this emergency period, for some insurers they may be billed the same as an in-person visit.  Please reach out to your insurance provider with any questions.    If during the course of the call the physician/provider feels a telephone visit is not appropriate, you will not be charged for this service.\"    Patient has given verbal consent for Telephone visit?  Yes    What phone number would you like to be contacted at? [phone]    How would you like to obtain your AVS? rBittani Rodas MA on 12/30/2020 at 9:54 AM    Phone call duration: 16 minutes    Thank you for the opportunity to participate in the care of  Paco Eckert.    Assessment and Plan:    In summary Paco Eckert is a 75 year old year old male here for sleep disturbance.  1. Witness apnea/Intermittent drowsiness/Snoring   Paco Eckert has high risk for obstructive sleep apnea based on the history of witness apnea, intermittent drowsiness and snoring. I educated the patient on the underlying pathophysiology of obstructive sleep apnea. We reviewed the risks associated with sleep apnea, including increased cardiovascular risk and overall death. We talked about treatments briefly. I recommend getting aHome sleep study. The " patient should return to the clinic to discuss results and treatment option in a patient-centered approach.    History of present illness:    He is a 75 year old male who comes to the [facility] clinic with a chief complaint of intermittent drowsiness that has been going on for about a year. His wife is also concerned that he had significant pauses in his breathing during sleep followed by loud snoring. He also has intermittent drowsiness as well. The patient's review of systems is otherwise negative.     Ideal Sleep-Wake Cycle(devoid of societal pressure):    Patient would try to initiate sleep at around 9:30PM with a sleep latency of 10minutes. The patient would have 2-3 awakenings. Final wake up time is around 6:30-7AM.      Patient told to return in one week after the sleep study is interpreted.    Past Medical History  Past Medical History:   Diagnosis Date     Fatty liver 2/9/2011     Gouty arthropathy      Hypercholesteremia 12/15/2009     Hypertension      Impaired fasting glucose 7/26/2010     Leukopenia 11/7/2008    WBC 3.6 in 11/08.     Malignant neoplasm of prostate (H) 2000    on casodex and Lupron     Osteoporosis, unspecified     assoc with Lupron     Other psoriasis         Past Surgical History  Past Surgical History:   Procedure Laterality Date     APPLY WOUND VAC N/A 10/21/2014    Procedure: APPLY WOUND VAC;  Surgeon: Mir Hernandez MD;  Location:  OR     COLONOSCOPY N/A 10/10/2014    Procedure: COMBINED COLONOSCOPY, SINGLE BIOPSY/POLYPECTOMY BY BIOPSY;  Surgeon: Mir Hernandez MD;  Location:  GI     COLOSTOMY N/A 10/21/2014    Procedure: COLOSTOMY;  Surgeon: Mir Hernandez MD;  Location:  OR     ORTHOPEDIC SURGERY Bilateral 2000 ?    rotater repair both     ORTHOPEDIC SURGERY Right     ankle      RESECTION ABDOMINAL PERINEAL N/A 10/21/2014    Procedure: RESECTION ABDOMINAL PERINEAL;  Surgeon: Mir Hernandez MD;  Location:  OR        Meds  Current Outpatient Medications   Medication  Sig Dispense Refill     lisinopril (PRINIVIL/ZESTRIL) 10 MG tablet Take 1 tablet (10 mg) by mouth daily 90 tablet 3     montelukast (SINGULAIR) 10 MG tablet TAKE 1 TABLET(10 MG) BY MOUTH AT BEDTIME 90 tablet 0     simvastatin (ZOCOR) 40 MG tablet Take 1 tablet (40 mg) by mouth At Bedtime 90 tablet 3     tadalafil (CIALIS) 5 MG tablet Take 1 tablet (5 mg) by mouth every 24 hours 30 tablet 2     loratadine-pseudoePHEDrine (CLARITIN-D 24-HOUR)  MG per 24 hr tablet Take 1 tablet by mouth daily          Allergies  Seasonal allergies     Social History  Social History     Socioeconomic History     Marital status:      Spouse name: Not on file     Number of children: Not on file     Years of education: Not on file     Highest education level: Not on file   Occupational History     Not on file   Social Needs     Financial resource strain: Not on file     Food insecurity     Worry: Not on file     Inability: Not on file     Transportation needs     Medical: Not on file     Non-medical: Not on file   Tobacco Use     Smoking status: Never Smoker     Smokeless tobacco: Never Used   Substance and Sexual Activity     Alcohol use: Yes     Alcohol/week: 0.0 standard drinks     Comment: 10-12 drinks per week     Drug use: No     Sexual activity: Yes     Partners: Female   Lifestyle     Physical activity     Days per week: Not on file     Minutes per session: Not on file     Stress: Not on file   Relationships     Social connections     Talks on phone: Not on file     Gets together: Not on file     Attends Synagogue service: Not on file     Active member of club or organization: Not on file     Attends meetings of clubs or organizations: Not on file     Relationship status: Not on file     Intimate partner violence     Fear of current or ex partner: Not on file     Emotionally abused: Not on file     Physically abused: Not on file     Forced sexual activity: Not on file   Other Topics Concern     Parent/sibling w/ CABG,  MI or angioplasty before 65F 55M? No   Social History Narrative     Not on file        Family History  Family History   Problem Relation Age of Onset     Diabetes Brother      Sleep Apnea Brother      Diabetes Sister      Sleep Apnea Sister      Hypertension Sister      Hypertension Brother      Snoring Father       Review of Systems:  Constitutional: Negative except as noted in HPI.   Eyes: Negative except as noted in HPI.   ENT: Negative except as noted in HPI.   Cardiovascular: Negative except as noted in HPI.   Respiratory: Negative except as noted in HPI.   Gastrointestinal: Negative except as noted in HPI.   Genitourinary: Negative except as noted in HPI.   Musculoskeletal: Negative except as noted in HPI.   Integumentary: Negative except as noted in HPI.   Neurological: Negative except as noted in HPI.   Psychiatric: Negative except as noted in HPI.   Endocrine: Negative except as noted in HPI.   Hematologic/Lymphatic: Negative except as noted in HPI.      Electrophysiology Latest Ref Rng & Units 4/25/2016 11/8/2016 10/3/2017 10/26/2018 11/14/2019 12/8/2020 12/14/2020   Sodium 133 - 144 mmol/L - 136 - 136 137 138 -   Potassium 3.4 - 5.3 mmol/L 4.6 4.6 - 5.0 4.7 4.9 -   Chloride 94 - 109 mmol/L - 105 - 105 107 109 -   CO2 20 - 32 mmol/L - 22 - 24 25 22 -   Anion Gap 3 - 14 mmol/L - 9 - 7 5 7 -   Glucose 70 - 99 mg/dL 132(A) 108(H) - 109(H) 102(H) 111(H) -   BUN 7 - 30 mg/dL - 20 - 32(H) 24 23 -   Creatinine 0.66 - 1.25 mg/dL 0.82 0.93 - 0.98 0.97 0.89 -   Calcium 8.5 - 10.1 mg/dL - 9.2 - 9.2 9.0 9.0 -   Cholesterol <200 mg/dL - - 205(H) 209(H) 145 151 -   LDL <100 mg/dL - - 136(H) 116(H) 84 66 -   HDL >39 mg/dL - - 29(L) 31(L) 28(L) 26(L) -   VLDL 0 - 30 mg/dL - - - - - - -   NHDL <130 mg/dL - - 176(H) 178(H) 117 125 -   Cholesterol/HDL Ratio 0.0 - 5.0 - - - - - - -   Triglycerides <150 mg/dL - - 198(H) 312(H) 166(H) 295(H) -   WBC 4.0 - 11.0 10e9/L - 5.9 - 7.0 - - 6.8   RBC 4.4 - 5.9 10e12/L - 3.78(L) -  4.05(L) - - 4.00(L)   Hemoglobin 13.3 - 17.7 g/dL - 12.9(L) - 13.2(L) - - 13.2(L)   Hematocrit 40.0 - 53.0 % - 37.3(L) - 37.6(L) - - 38.1(L)   MCV 78 - 100 fl - 99 - 93 - - 95   MCHC 31.5 - 36.5 g/dL - 34.6 - 35.1 - - 34.6   RDW 10.0 - 15.0 % - 12.7 - 12.2 - - 12.9   Platelets 150 - 450 10e9/L - 126(L) - 167 - - 181   Albumin 3.4 - 5.0 g/dL - 4.4 - 4.2 4.0 3.8 -   ALT 0 - 70 U/L 16 25 - 21 16 25 -   AST 0 - 45 U/L 20 21 - 22 12 21 -   Total Bilirubin 0.2 - 1.3 mg/dL - 0.8 - 0.8 0.7 0.6 -   Alkaline Phosphatase 40 - 150 U/L - 69 - 75 71 73 -   Total Protein 6.8 - 8.8 g/dL - 8.0 - 7.8 7.3 7.6 -   TSH 0.4 - 5.0 mU/L - - - - - - -   INR 0.86 - 1.14 - - - - - - -        Labs/Studies:     No results found for: PH, PHARTERIAL, PO2, [id number], SAT, PCO2, HCO3, BASEEXCESS, EV, BEB  Lab Results   Component Value Date    TSH 2.14 03/29/2007     Lab Results   Component Value Date     (H) 12/08/2020     (H) 11/14/2019     Lab Results   Component Value Date    HGB 13.2 (L) 12/14/2020    HGB 13.2 (L) 10/26/2018     Lab Results   Component Value Date    BUN 23 12/08/2020    BUN 24 11/14/2019    CR 0.89 12/08/2020    CR 0.97 11/14/2019     Lab Results   Component Value Date    AST 21 12/08/2020    AST 12 11/14/2019    ALT 25 12/08/2020    ALT 16 11/14/2019    ALKPHOS 73 12/08/2020    ALKPHOS 71 11/14/2019    BILITOTAL 0.6 12/08/2020    BILITOTAL 0.7 11/14/2019    BILICONJ 0.0 12/06/2002     No results found for: UAMP, UBARB, BENZODIAZEUR, UCANN, UCOC, OPIT, UPCP    No components found for: FERRITIN      Patient verbalized understanding of these issues, agrees with the plan and all questions were answered today. Patient was given an opportuntity to voice any other symptoms or concerns not listed above. Patient did not have any other symptoms or concerns.        Gerry Parikh DO  Board Certified in Internal Medicine and Sleep Medicine    (Note created with Dragon voice recognition and unintended spelling errors  and word substitutions may occur)

## 2020-12-30 NOTE — TELEPHONE ENCOUNTER
Reason for call:  Other   Patient called regarding (reason for call): appointment and call back  Additional comments: Patient called to schedule home sleep study. Please call patient to schedule.    Phone number to reach patient:  Cell number on file:    Telephone Information:   Mobile [phone]       Best Time:  Any    Can we leave a detailed message on this number?  YES    Travel screening: Not Applicable

## 2020-12-30 NOTE — PATIENT INSTRUCTIONS
What is a Home Sleep Study?    your doctor can give you a portable sleep monitor to use at home, so you don t have to spend the night in the sleep lab. But you should use a portable monitor only if:   ?Your doctor thinks you have a condition that makes you stop breathing for short periods while you are asleep, called  sleep apnea.    ?You do not have other serious medical problems, such as heart disease or lung disease.    Please bring the home sleep study device back to the sleep center as soon as you are finished with it so we can score it.     The cost of care estimate line is [phone]. They are able to give the patient an estimate of the charges and also an estimate of their insurance coverage/patient responsibility.   After your sleep study is performed, please call us at [phone] or [phone] to schedule for a follow up to review the results of the sleep study.    Consider using one tab of low dose melatonin 3 mg or less on the night of the study.    It is completely voluntary.    Do not drive or operate machinery after intake of melatonin.     Due to the pandemic, we have many people waiting in line for sleep studies- this wait list is improving each week.   You should receive a call within 2 weeks from our staff to schedule you for  your test.   Depending on the delay in approval by your insurance carrier, the study will be completed within a few days to 2 weeks of that call.

## 2020-12-31 NOTE — TELEPHONE ENCOUNTER
Attempted to reach Paco today to schedule HST, and 2 week follow up visit for test results. No answer. [facility]      Anna LOWE CMA, [facility], 12/31/2020 9:25 AM

## 2021-01-04 ENCOUNTER — TELEPHONE (OUTPATIENT)
Dept: SLEEP MEDICINE | Facility: CLINIC | Age: 76
End: 2021-01-04

## 2021-01-04 NOTE — TELEPHONE ENCOUNTER
Authorization Number: [id number]  Review Date: 1/4/2021 2:36:21 PM  Expiration Date: 7/3/2021  Status: Your case has been Approved.      Okay to schedule with Dr Mary Ann BOLANOS    [facility]

## 2021-01-04 NOTE — TELEPHONE ENCOUNTER
Spoke with patient to schedule HST. Patient states is leaving for [place] soon and does not want to travel to [place]. Patient stated he will call us when ready to schedule.

## 2021-01-04 NOTE — TELEPHONE ENCOUNTER
Per pt he is in pain and wants to expedite this asap.       Gigi MOJICA    [facility] [place]

## 2021-01-11 ENCOUNTER — RADIOLOGY INJECTION OFFICE VISIT (OUTPATIENT)
Dept: PALLIATIVE MEDICINE | Facility: CLINIC | Age: 76
End: 2021-01-11
Attending: PHYSICAL MEDICINE & REHABILITATION
Payer: COMMERCIAL

## 2021-01-11 ENCOUNTER — ANCILLARY PROCEDURE (OUTPATIENT)
Dept: GENERAL RADIOLOGY | Facility: CLINIC | Age: 76
End: 2021-01-11
Attending: PHYSICAL MEDICINE & REHABILITATION
Payer: COMMERCIAL

## 2021-01-11 VITALS — OXYGEN SATURATION: 98 % | SYSTOLIC BLOOD PRESSURE: 129 MMHG | DIASTOLIC BLOOD PRESSURE: 76 MMHG | HEART RATE: 72 BPM

## 2021-01-11 DIAGNOSIS — M54.16 LUMBAR RADICULOPATHY: ICD-10-CM

## 2021-01-11 DIAGNOSIS — M48.061 SPINAL STENOSIS OF LUMBAR REGION WITHOUT NEUROGENIC CLAUDICATION: Primary | ICD-10-CM

## 2021-01-11 NOTE — NURSING NOTE
Discharge Information    IV Discontiued Time:  NA    Amount of Fluid Infused:  NA    Discharge Criteria = When patient returns to baseline or as per MD order    Consciousness:  Pt is fully awake    Circulation:  BP +/- 20% of pre-procedure level    Respiration:  Patient is able to breathe deeply    O2 Sat:  Patient is able to maintain O2 Sat >92% on room air    Activity:  Moves 4 extremities on command    Ambulation:  Patient is able to stand and walk or stand and pivot into wheelchair    Dressing:  Clean/dry or No Dressing    Notes:   Discharge instructions and AVS given to patient    Patient meets criteria for discharge?  YES    Admitted to PCU?  No    Responsible adult present to accompany patient home?  Yes    Signature/Title:    Evelina Han RN  RN Care Coordinator  [facility] [place]

## 2021-01-11 NOTE — PATIENT INSTRUCTIONS
[facility] Procedure Discharge Instructions    Today you saw:    Dr. Sully Reese       Your procedure:  Epidural steroid injection     Medications used:  Lidocaine (anesthetic), Omnipaque (contrast)             Be cautious when walking as numbness and/or weakness in the legs may occur up to 6-8 hours after the procedure due to effect of the local anesthetic    Do not drive for 6 hours. The effect of the local anesthetic could slow your reflexes.     You may shower, however avoid swimming, tub baths or hot tubs for 24 hours following your procedure    You may have a mild to moderate increase in pain for several days following the injection.      You may use ice packs for 10-15 minutes, 3 to 4 times a day at the injection site for comfort    Do not use heat to painful areas for 6 to 8 hours. This will give the local anesthetic time to wear off and prevent you from accidentally burning your skin.    Unless you have been directed to avoid the use of anti-inflammatory medications (NSAIDS-ibuprofen, Aleve, Motrin), you may use these medications or Tylenol for pain control if needed.     We will place the order for a new injection (different approach) for you.      If you experience any of the following, call the pain center line during work hours at [phone] or on-call physician after hours at [phone]:  -Fever over 100 degree F  -Swelling, bleeding, redness, drainage, warmth at the injection site  -Progressive weakness or numbness in your legs   -Loss of bowel or bladder function  -Unusual headache that is not relieved by Tylenol or your regular headache medication  -Unusual new onset of pain that is not improving

## 2021-01-11 NOTE — PROGRESS NOTES
[facility] - Procedure Note    Date of Visit: 1/11/2021    Procedure attempted: Attempted Lumbar L3-4 interlaminar epidural steroid injection and L2-3 interlaminar epidural steroid injections. There was subdural spread after contrast administration at both sites. Procedure aborted.   Diagnosis: Lumbar spondylosis; Lumbar stenosis  : Sully Reese MD  Anesthesia: None    Indications: Paco Eckert is a 75 year old male who was seen by me for an interventional consultation who presents today for a lumbar epidural steroid injection. The patient describes axial bilateral low back pain. The patient has been exhibiting symptoms consistent with lumbar spondylosis and lumbar stenosis. Symptoms have been persistent and intermittently severe. The patient reports minimal improvement with conservative treatment, including medications and exercise.     Lumbar MRI was done on 12/23/2020 which showed   FINDINGS:   Nomenclature is based on 5 lumbar type vertebral bodies. There is an   old anteriorly wedged compression deformity of L1. Vertebral body   heights are otherwise maintained. Posterior alignment is normal. Bone   marrow signal intensity is normal. Normal distal spinal cord and   cauda equina with conus medullaris at L1-L2. No extraspinal   abnormality. Unremarkable visualized bony pelvis.     T12-L1: Minimal disc bulge. Normal facets. No stenosis.      L1-L2: Minimal disc bulge. Mild facet hypertrophy. No stenosis.     L2-L3: Broad-based disc bulging and mild-to-moderate facet and   ligamentum flavum hypertrophy is present causing moderate central   canal stenosis and mild-to-moderate bilateral neural foraminal   stenosis. Prominent posterior epidural fat is contributing to the   central canal stenosis.      L3-L4: Broad-based disc bulging and mild-to-moderate facet and   ligamentum flavum hypertrophy is present causing some moderate   central canal stenosis and moderate bilateral  neural foraminal   stenosis.     L4-L5: Broad-based disc bulge with a more focal right posterior   lateral disc protrusion is present along with moderate to severe   facet hypertrophy. There is secondary moderate to severe central   canal stenosis, moderate to severe right-sided foraminal stenosis and   moderate left-sided foraminal stenosis.     L5-S1: Broad-based disc bulging with a more focal central disc   protrusion is present along with moderate facet hypertrophy. There is   secondary moderate bilateral neural foraminal stenosis and mild   central canal stenosis.     IMPRESSION: Multilevel degenerative disc and facet disease most   advanced at L4-L5 where there is secondary moderate to severe central   canal stenosis, moderate to severe right-sided foraminal stenosis and   moderate left-sided foraminal stenosis. Findings have not appreciably   changed since the prior study.    Allergies:      Allergies   Allergen Reactions     Seasonal Allergies       Vitals:  /76   Pulse 72   SpO2 98%     Review of Systems: The patient denies recent fever, chills, illness, use of antibiotics or anticoagulants. All other 10-point review of systems negative.     Procedure: The procedure and risks were explained, and informed written consent was obtained from the patient. Risks include but are not limited to: infection, bleeding, increased pain, and damage to soft tissue, nerve, muscle, and vasculature structures. After getting informed consent, patient was brought into the procedure suite and was placed in a prone position on the procedure table. A Pause for the Cause was performed. Patient was prepped and draped in sterile fashion.     The L3-4 interspace was identified with use of fluoroscopy in AP view. A 25-gauge, 1.5 inch needle was used to anesthetize the skin and subcutaneous tissue entry site with a total of 2 ml of 1% lidocaine. Under fluoroscopic visualization, a 22-gauge, 4.5 inch Tuohy epidural needle was  slowly advanced towards the epidural space a few millimeters left of midline. The latter part of the needle advancement was guided with fluoroscopy in the lateral view. The epidural space was identified using loss of resistance technique. After negative aspiration for heme and cerebrospinal fluid, contrast dye was injected. This showed subdural spread. The same procedure was repeated at the L2-3 level since the initial level showed subdural spread. Loss of resistance technique was used to identify epidural space. Contrast administration again showed subdural spread. Procedure was aborted. A total of 3 mL of non-ionic contrast was injected. No medicine was injected.     The patient tolerated the procedure well, and there was no evidence of procedural complications. No new sensory or motor deficits were noted following the procedure. The patient was stable and able to ambulate on discharge home. Post-procedure instructions were provided.     Assessment/Plan: Paco Eckert is a 75 year old male s/p attempted lumbar interlaminar epidural steroid injection today for lumbar spondylosis and lumbar stenosis.     1. Following today's procedure, the patient was advised to contact the [facility] [place] for any of the following:   Fever, chills, or night sweats   New onset of pain, numbness, or weakness   Any questions/concerns regarding the procedure  If unable to contact the Pain Center, the patient was instructed to go to a local Emergency Room for any complications.   2. Order placed for bilateral L3-4 vs L4-5 Transforaminal epidural steroid injection. He will need a new prior authorization for this.       Sully Reese MD  [facility] [place]

## 2021-02-15 ENCOUNTER — TELEPHONE (OUTPATIENT)
Dept: PALLIATIVE MEDICINE | Facility: CLINIC | Age: 76
End: 2021-02-15

## 2021-02-15 NOTE — TELEPHONE ENCOUNTER
Screening Questions for Radiology Injections:    Injection to be done at which interventional clinic site? [facility]    If [facility] location, tell patient that this procedure requires a COVID-19 lab test be done within 4 days of the procedure. Would you still like to move forward with scheduling the procedure?  Not Applicable   If YES, let patient know that someone will call them to schedule the COVID-19 test and that they will only receive a call back if the result is positive. Route to nursing to enter order.     Instruct patient to arrive as directed prior to the scheduled appointment time:    [place] minutes before      [place]: 30 minutes before; if IV needed 1 hour before     Procedure ordered by Mary Ann    Procedure ordered? Bilateral L3-4 vs L4-5 Transforaminal epidural steroid injection      Transforaminal Cervical WALTER - no pain provider currently performing    As a reminder, receiving steroids can decrease your body's ability to fight infection.   Would you still like to move forward with scheduling the injection?  Yes    What insurance would patient like us to bill for this procedure? BCBS/ Medicare      Worker's comp or MVA (motor vehicle accident) -Any injection DO NOT SCHEDULE and route to Mercedes Foster.      [employer] insurance - For SI joint injections, DO NOT SCHEDULE and route Mercedes Foster.       ALL BCBS, [employer] and [employer]-Route to Mercedes for review DO NOT SCHEDULE      IF SCHEDULING IN [place] AND NEEDS A PA, IT IS OKAY TO SCHEDULE. [place] HANDLES THEIR OWN PA'S AFTER THE PATIENT IS SCHEDULED. PLEASE SCHEDULE AT LEAST 1 WEEK OUT SO A PA CAN BE OBTAINED.    Any chance of pregnancy? Not Applicable   If YES, do NOT schedule and route to RN pool    Is an  needed? No     Patient has a drive home? (mandatory) YES: INFORMED    Is patient taking any blood thinners (i.e. plavix, coumadin, jantoven, warfarin, heparin, pradaxa or dabigatran, etc)? No   If  hold needed, do NOT schedule, route to RN pool     Is patient taking any aspirin products (includes Excedrin and Fiorinal)? No     If more than 325mg/day, OK to schedule; Instruct pt to decrease to less than 325 mg for 7 days AND route to RN pool    For CERVICAL procedures, hold all aspirin products for 6 days.     Tell pt that if aspirin product is not held for 6 days, the procedure WILL BE cancelled.      Does the patient have a bleeding or clotting disorder? No     If YES, okay to schedule AND route to RN nurse pool    For any patients with platelet count <100, must be forwarded to provider    Is patient diabetic?  No  If YES, instruct them to bring their glucometer.    Does patient have an active infection or treated for one within the past week? No     Is patient currently taking any antibiotics?  No     For patients on chronic, preventative, or prophylactic antibiotics, procedures may be scheduled.     For patients on antibiotics for active or recent infection:antibiotic course must have been completed for 4 days    Is patient currently taking any steroid medications? (i.e. Prednisone, Medrol)  No     For patients on steroid medications, course must have been completed for 4 days    Is patient actively being treated for cancer or immunocompromised? No  If YES, do NOT schedule and route to RN pool     Are you able to get on and off an exam table with minimal or no assistance? Yes  If NO, do NOT schedule and route to RN pool    Are you able to roll over and lay on your stomach with minimal or no assistance? Yes  If NO, do NOT schedule and route to RN pool     Any allergies to contrast dye, iodine, shellfish, or numbing and steroid medications? No  If YES, route to RN pool AND add allergy information to appointment notes    Allergies: Seasonal allergies      Has the patient had a flu shot or any other vaccinations within 7 days before or after the procedure.  No     Does patient have an MRI/CT?  YES: 2020  Check  Procedure Scheduling Grid to see if required.      Was the MRI done within the last 3 years?  Yes    If yes, [facility], [facility], [facility] etc? [place]      If no, do not schedule and route to RN pool    If MRI was not done at [place], [facility] or [facility] do NOT schedule and route to RN pool.      If pt has an imaging disc, the injection MAY be scheduled but pt has to bring disc to appt.     If they show up without the disc the injection cannot be done    Procedure Specific Instructions:      If celiac plexus block, informed patient NPO for 6 hours and that it is okay to take medications with sips of water, especially blood pressure medications  Not Applicable         If this is for a cervical procedure, informed patient that aspirin needs to be held for 6 days.   Not Applicable      If IV needed:    Do not schedule procedures requiring IV placement in the first appointment of the day or first appointment after lunch. Do NOT schedule at 0745, 0815 or 1245.     Instructed pt to arrive 30 minutes early for IV start if required. (Check Procedure Scheduling Grid)  Not Applicable    Reminders:      If you are started on any steroids or antibiotics between now and your appointment, you must contact us because the procedure may need to be cancelled.  Yes      For all procedures except radiofrequency ablations (RFAs) and spinal cord stimulator (SCS) trials, informed patient:    IV sedation is not provided for this procedure.  If you feel that an oral anti-anxiety medication is needed, you can discuss this further with your referring provider or primary care provider.  The Pain Clinic provider will discuss specifics of what the procedure includes at your appointment.  Most procedures last 10-20 minutes.  We use numbing medications to help with any discomfort during the procedure.  Not Applicable      For patients 85 or older we recommend having an adult stay w/ them for  the remainder of the day.       Does the patient have any questions?  NO  Vickie Gupta  [facility]

## 2021-02-17 NOTE — TELEPHONE ENCOUNTER
Authorization Number: [id number]  Review Date: 2/17/2021 10:31:10 AM  Expiration Date: 8/16/2021  Status: Your case has been Approved.      Okay to schedule with Dr Mary Ann BOLANOS    [facility]

## 2021-03-02 ENCOUNTER — OFFICE VISIT (OUTPATIENT)
Dept: URGENT CARE | Facility: URGENT CARE | Age: 76
End: 2021-03-02
Payer: COMMERCIAL

## 2021-03-02 VITALS
BODY MASS INDEX: 32.5 KG/M2 | DIASTOLIC BLOOD PRESSURE: 83 MMHG | WEIGHT: 204.4 LBS | TEMPERATURE: 96.8 F | HEART RATE: 79 BPM | OXYGEN SATURATION: 99 % | SYSTOLIC BLOOD PRESSURE: 144 MMHG

## 2021-03-02 DIAGNOSIS — R30.0 DYSURIA: ICD-10-CM

## 2021-03-02 DIAGNOSIS — N30.01 ACUTE CYSTITIS WITH HEMATURIA: Primary | ICD-10-CM

## 2021-03-02 LAB
ALBUMIN UR-MCNC: 30 MG/DL
APPEARANCE UR: ABNORMAL
BACTERIA #/AREA URNS HPF: ABNORMAL /HPF
BILIRUB UR QL STRIP: NEGATIVE
COLOR UR AUTO: ABNORMAL
GLUCOSE UR STRIP-MCNC: NEGATIVE MG/DL
HGB UR QL STRIP: ABNORMAL
KETONES UR STRIP-MCNC: NEGATIVE MG/DL
LEUKOCYTE ESTERASE UR QL STRIP: ABNORMAL
NITRATE UR QL: POSITIVE
NON-SQ EPI CELLS #/AREA URNS LPF: ABNORMAL /LPF
PH UR STRIP: 5 PH (ref 5–7)
RBC #/AREA URNS AUTO: ABNORMAL /HPF
SOURCE: ABNORMAL
SP GR UR STRIP: 1.02 (ref 1–1.03)
UROBILINOGEN UR STRIP-ACNC: 0.2 EU/DL (ref 0.2–1)
WBC #/AREA URNS AUTO: >100 /HPF

## 2021-03-02 PROCEDURE — 87186 SC STD MICRODIL/AGAR DIL: CPT | Performed by: NURSE PRACTITIONER

## 2021-03-02 PROCEDURE — 81001 URINALYSIS AUTO W/SCOPE: CPT | Performed by: NURSE PRACTITIONER

## 2021-03-02 PROCEDURE — 87086 URINE CULTURE/COLONY COUNT: CPT | Performed by: NURSE PRACTITIONER

## 2021-03-02 PROCEDURE — 87088 URINE BACTERIA CULTURE: CPT | Performed by: NURSE PRACTITIONER

## 2021-03-02 PROCEDURE — 99213 OFFICE O/P EST LOW 20 MIN: CPT | Performed by: NURSE PRACTITIONER

## 2021-03-02 RX ORDER — SULFAMETHOXAZOLE/TRIMETHOPRIM 800-160 MG
1 TABLET ORAL 2 TIMES DAILY
Qty: 10 TABLET | Refills: 0 | Status: SHIPPED | OUTPATIENT
Start: 2021-03-02 | End: 2021-03-07

## 2021-03-02 NOTE — PATIENT INSTRUCTIONS
Patient Education     Bladder Infection, Male (Adult)     You have a bladder infection.  Urine is normally free of bacteria. But bacteria can get into the urinary tract from the skin around the rectum. Or it may travel in the blood from other parts of the body.  This is called a urinary tract infection (UTI). An infection can occur anywhere in the urinary tract. It could be in a kidney (pyelonephritis) or in the bladder (cystitis) and urethra (urethritis). The urethra is the tube that drains urine from the bladder through the tip of the penis.  The most common place for a UTI is in the bladder. This is called a bladder infection. Most bladder infections are easily treated. They are not serious unless the infection spreads up to the kidney.  The terms bladder infection, UTI, and cystitis are often used to describe the same thing. But they aren t always the same. Cystitis is an inflammation of the bladder. The most common cause of cystitis is an infection.   Keep in mind:    Infections in the urine are called UTIs.    Cystitis is often caused by a UTI.    Not all UTIs and cases of cystitis are bladder infections.    Bladder infections are the most common type of cystitis.  Symptoms of a bladder infection  The infection causes inflammation in the urethra and bladder. This inflammation causes many of the symptoms. The most common symptoms of a bladder infection are:    Pain or burning when urinating    Having to go more often than normal    Feeling like you need to go right away    Only a small amount of urine comes out    Blood in urine    Discomfort in your belly (abdomen), often in the lower belly, above the pubic bone    Cloudy, strong, or bad-smelling urine    Unable to urinate (retention)    Urinary incontinence    Fever    Loss of appetite  Older adults may also feel confused.  Causes of a bladder infection  Bladder infections are not contagious. You can't get one from someone else, from a toilet seat, or from  sharing a bath.  The most common cause of bladder infections is bacteria from the bowels. The bacteria get onto the skin around the opening of the urethra. From there they can get into the urine and travel up to the bladder. This causes inflammation and an infection. This often happens because of:    An enlarged prostate    Poor cleaning of the genitals    Procedures that put a tube in your bladder, such as a Vizcarra catheter    Bowel incontinence    Older age    Not emptying your bladder (the urine stays there, giving the bacteria a chance to grow)    Dehydration (this lets urine to stay in the bladder longer)    Constipation (this can cause the bowels to push on the bladder or urethra and keep the bladder from emptying)  Treatment  Bladder infections are treated with antibiotics. They often clear up quickly without complications. Treatment helps prevent a more serious kidney infection.  Medicines  Medicines can help in treating a bladder infection:    You may have been given phenazopyridine to ease burning when you urinate. It will cause your urine to be bright orange. It can stain clothing.    You may have been prescribed antibiotics. Take this medicine until you have finished it, even if you feel better. Taking all of the medicine will make sure the infection has cleared.  You can use acetaminophen or ibuprofen for pain, fever, or discomfort, unless another medicine was prescribed. You can also alternate them, or use both together. They work differently and are a different class of medicines, so taking them together is not an overdose. If you have chronic liver or kidney disease, talk with your healthcare provider before using these medicines. Also talk with your provider if you ve had a stomach ulcer or GI (gastrointestinal) bleeding or are taking blood thinner medicines.  Home care  Here are some guidelines to help you care for yourself at home:    Drink plenty of fluids, unless your healthcare provider told you  not to. Fluids will prevent dehydration and flush out your bladder.    Use good personal hygiene. Wipe from front to back after using the toilet, and clean your penis regularly. If you aren t circumcised, retract the foreskin when cleaning.    Urinate more often, and don t try to hold it in for long periods of time, if possible.    Wear loose-fitting clothes and cotton underwear. Don't wear tight-fitting pants. This helps keep you clean and dry.    Change your diet to prevent constipation. This means eating more fresh foods and more fiber, and less junk and fatty foods.    Don't have sex until your symptoms are gone.    Don't have caffeine, alcohol, and spicy foods. These can irritate the bladder.  Follow-up care  Follow up with your healthcare provider, or as advised, if all symptoms have not cleared up in 5 days. It's important to keep your follow-up appointment. You can talk with your provider to see if you need more tests of the urinary tract. This is especially important if you have infections that keep coming back.  If a culture was done, you will be told if your treatment needs to be changed. If directed, you can call to find out the results.  If X-rays were taken, you will be told of any findings that may affect your care.  Call 911  Call 911 if any of these occur:    Trouble breathing    Trouble waking up    Feeling confused    Fainting or loss of consciousness    Fast heart rate  When to get medical advice  Call your healthcare provider right away if any of these occur:    Fever of 100.4 F (38 C) or higher, or as directed by your provider    Your symptoms don t improve after 2 days of treatment    Back or b pain that gets worse    Repeated vomiting, or you aren t able to keep medicine down    Weakness or dizziness  [employer] last reviewed this educational content on 10/1/2019    [id number] [employer]. [address], [place] [address]. All rights reserved. This information is not  intended as a substitute for professional medical care. Always follow your healthcare professional's instructions.

## 2021-03-02 NOTE — PROGRESS NOTES
Chief Complaint   Patient presents with     Urgent Care     UTI     kidneys are sore, frequency urination, blood in urine- 1 week          ICD-10-CM    1. Acute cystitis with hematuria  N30.01 sulfamethoxazole-trimethoprim (BACTRIM DS) 800-160 MG tablet   2. Dysuria  R30.0 UA reflex to Microscopic and Culture     Urine Microscopic     Urine Culture Aerobic Bacterial     sulfamethoxazole-trimethoprim (BACTRIM DS) 800-160 MG tablet   Patient knows to return if symptoms persist after 5 days of antibiotics or to go to the emergency room if symptoms worsen, he develops fever or is unable to keep medications down    Medical Decision Making    Differential Diagnosis:  UTI: UTI, Dysuria, Pyelonephritis, Kidney Stone, Urethritis and Prostatitis      Results for orders placed or performed in visit on 03/02/21 (from the past 24 hour(s))   UA reflex to Microscopic and Culture    Specimen: Midstream Urine   Result Value Ref Range    Color Urine Orange     Appearance Urine Cloudy     Glucose Urine Negative NEG^Negative mg/dL    Bilirubin Urine Negative NEG^Negative    Ketones Urine Negative NEG^Negative mg/dL    Specific Gravity Urine 1.020 1.003 - 1.035    Blood Urine Small (A) NEG^Negative    pH Urine 5.0 5.0 - 7.0 pH    Protein Albumin Urine 30 (A) NEG^Negative mg/dL    Urobilinogen Urine 0.2 0.2 - 1.0 EU/dL    Nitrite Urine Positive (A) NEG^Negative    Leukocyte Esterase Urine Small (A) NEG^Negative    Source Midstream Urine    Urine Microscopic   Result Value Ref Range    WBC Urine >100 (A) OTO5^0 - 5 /HPF    RBC Urine 5-10 (A) OTO2^O - 2 /HPF    Squamous Epithelial /LPF Urine Few FEW^Few /LPF    Bacteria Urine Moderate (A) NEG^Negative /HPF       Subjective     Paco Eckert is an 75 year oldmale who presents to clinic today for dysuria, urgency and frequency of urination for 1 week.  He has had bladder infections in the past with similar symptoms.  He denies fever, nausea, back pain, chills.       Objective    BP (!)  144/83   Pulse 79   Temp 96.8  F (36  C)   Wt 92.7 kg (204 lb 6.4 oz)   SpO2 99%   BMI 32.50 kg/m      Physical Exam       GENERAL APPEARANCE: healthy appearing, alert     ABDOMEN:  soft, nontender, no HSM or masses and bowel sounds normal, no CVA tenderness     SKIN: no suspicious lesions or rashes    Patient Instructions     Patient Education     Bladder Infection, Male (Adult)     You have a bladder infection.  Urine is normally free of bacteria. But bacteria can get into the urinary tract from the skin around the rectum. Or it may travel in the blood from other parts of the body.  This is called a urinary tract infection (UTI). An infection can occur anywhere in the urinary tract. It could be in a kidney (pyelonephritis) or in the bladder (cystitis) and urethra (urethritis). The urethra is the tube that drains urine from the bladder through the tip of the penis.  The most common place for a UTI is in the bladder. This is called a bladder infection. Most bladder infections are easily treated. They are not serious unless the infection spreads up to the kidney.  The terms bladder infection, UTI, and cystitis are often used to describe the same thing. But they aren t always the same. Cystitis is an inflammation of the bladder. The most common cause of cystitis is an infection.   Keep in mind:    Infections in the urine are called UTIs.    Cystitis is often caused by a UTI.    Not all UTIs and cases of cystitis are bladder infections.    Bladder infections are the most common type of cystitis.  Symptoms of a bladder infection  The infection causes inflammation in the urethra and bladder. This inflammation causes many of the symptoms. The most common symptoms of a bladder infection are:    Pain or burning when urinating    Having to go more often than normal    Feeling like you need to go right away    Only a small amount of urine comes out    Blood in urine    Discomfort in your belly (abdomen), often in the  lower belly, above the pubic bone    Cloudy, strong, or bad-smelling urine    Unable to urinate (retention)    Urinary incontinence    Fever    Loss of appetite  Older adults may also feel confused.  Causes of a bladder infection  Bladder infections are not contagious. You can't get one from someone else, from a toilet seat, or from sharing a bath.  The most common cause of bladder infections is bacteria from the bowels. The bacteria get onto the skin around the opening of the urethra. From there they can get into the urine and travel up to the bladder. This causes inflammation and an infection. This often happens because of:    An enlarged prostate    Poor cleaning of the genitals    Procedures that put a tube in your bladder, such as a Vizcarra catheter    Bowel incontinence    Older age    Not emptying your bladder (the urine stays there, giving the bacteria a chance to grow)    Dehydration (this lets urine to stay in the bladder longer)    Constipation (this can cause the bowels to push on the bladder or urethra and keep the bladder from emptying)  Treatment  Bladder infections are treated with antibiotics. They often clear up quickly without complications. Treatment helps prevent a more serious kidney infection.  Medicines  Medicines can help in treating a bladder infection:    You may have been given phenazopyridine to ease burning when you urinate. It will cause your urine to be bright orange. It can stain clothing.    You may have been prescribed antibiotics. Take this medicine until you have finished it, even if you feel better. Taking all of the medicine will make sure the infection has cleared.  You can use acetaminophen or ibuprofen for pain, fever, or discomfort, unless another medicine was prescribed. You can also alternate them, or use both together. They work differently and are a different class of medicines, so taking them together is not an overdose. If you have chronic liver or kidney disease, talk  with your healthcare provider before using these medicines. Also talk with your provider if you ve had a stomach ulcer or GI (gastrointestinal) bleeding or are taking blood thinner medicines.  Home care  Here are some guidelines to help you care for yourself at home:    Drink plenty of fluids, unless your healthcare provider told you not to. Fluids will prevent dehydration and flush out your bladder.    Use good personal hygiene. Wipe from front to back after using the toilet, and clean your penis regularly. If you aren t circumcised, retract the foreskin when cleaning.    Urinate more often, and don t try to hold it in for long periods of time, if possible.    Wear loose-fitting clothes and cotton underwear. Don't wear tight-fitting pants. This helps keep you clean and dry.    Change your diet to prevent constipation. This means eating more fresh foods and more fiber, and less junk and fatty foods.    Don't have sex until your symptoms are gone.    Don't have caffeine, alcohol, and spicy foods. These can irritate the bladder.  Follow-up care  Follow up with your healthcare provider, or as advised, if all symptoms have not cleared up in 5 days. It's important to keep your follow-up appointment. You can talk with your provider to see if you need more tests of the urinary tract. This is especially important if you have infections that keep coming back.  If a culture was done, you will be told if your treatment needs to be changed. If directed, you can call to find out the results.  If X-rays were taken, you will be told of any findings that may affect your care.  Call 911  Call 911 if any of these occur:    Trouble breathing    Trouble waking up    Feeling confused    Fainting or loss of consciousness    Fast heart rate  When to get medical advice  Call your healthcare provider right away if any of these occur:    Fever of 100.4 F (38 C) or higher, or as directed by your provider    Your symptoms don t improve after 2  days of treatment    Back or b pain that gets worse    Repeated vomiting, or you aren t able to keep medicine down    Weakness or dizziness  Rae[employer] last reviewed this educational content on 10/1/2019    2489-1420 [employer]. [address] [place] [address], [place] [address]. All rights reserved. This information is not intended as a substitute for professional medical care. Always follow your healthcare professional's instructions.               ANDREI Chambers, CNP  [facility] Provider

## 2021-03-04 LAB
BACTERIA SPEC CULT: ABNORMAL
Lab: ABNORMAL
SPECIMEN SOURCE: ABNORMAL

## 2021-03-15 ENCOUNTER — TRANSFERRED RECORDS (OUTPATIENT)
Dept: HEALTH INFORMATION MANAGEMENT | Facility: CLINIC | Age: 76
End: 2021-03-15

## 2021-03-16 ENCOUNTER — IMMUNIZATION (OUTPATIENT)
Dept: NURSING | Facility: CLINIC | Age: 76
End: 2021-03-16
Payer: COMMERCIAL

## 2021-03-16 PROCEDURE — 0001A PR COVID VAC PFIZER DIL RECON 30 MCG/0.3 ML IM: CPT

## 2021-03-16 PROCEDURE — 91300 PR COVID VAC PFIZER DIL RECON 30 MCG/0.3 ML IM: CPT

## 2021-03-19 ENCOUNTER — TELEPHONE (OUTPATIENT)
Dept: PALLIATIVE MEDICINE | Facility: CLINIC | Age: 76
End: 2021-03-19

## 2021-03-19 NOTE — TELEPHONE ENCOUNTER
LVM, reminded patient of date, time and location of appointment.      Reminded patient to eat and drink as usual, hold any blood thinners or pain medications that they were asked to hold per nurse.     Reminded patient they will need a  for this appointment.      Reminded patient that both patient and  must wear a mask during their visit.        Ludmila Kaplan MA  [facility]

## 2021-03-22 ENCOUNTER — RADIOLOGY INJECTION OFFICE VISIT (OUTPATIENT)
Dept: PALLIATIVE MEDICINE | Facility: CLINIC | Age: 76
End: 2021-03-22
Payer: COMMERCIAL

## 2021-03-22 VITALS — OXYGEN SATURATION: 99 % | SYSTOLIC BLOOD PRESSURE: 141 MMHG | DIASTOLIC BLOOD PRESSURE: 76 MMHG | HEART RATE: 79 BPM

## 2021-03-22 DIAGNOSIS — M54.16 LUMBAR RADICULOPATHY: ICD-10-CM

## 2021-03-22 PROCEDURE — 64483 NJX AA&/STRD TFRM EPI L/S 1: CPT | Mod: 50 | Performed by: PHYSICAL MEDICINE & REHABILITATION

## 2021-03-22 NOTE — PATIENT INSTRUCTIONS
[facility] Procedure Discharge Instructions    Today you saw:     Dr. Sully Reese       Your procedure:  Epidural steroid injection       Medications used:  Lidocaine (anesthetic)  Dexamethasone (steroid)   Omnipaque (contrast)            Be cautious when walking as numbness and/or weakness in the legs may occur up to 6-8 hours after the procedure due to effect of the local anesthetic    Do not drive for 6 hours. The effect of the local anesthetic could slow your reflexes.     Avoid strenuous activity for the first 24 hours. You may resume your regular activities after that.     You may shower, however avoid swimming, tub baths or hot tubs for 24 hours following your procedure    You may have a mild to moderate increase in pain for several days following the injection.      You may use ice packs for 10-15 minutes, 3 to 4 times a day at the injection site for comfort    Do not use heat to painful areas for 6 to 8 hours. This will give the local anesthetic time to wear off and prevent you from accidentally burning your skin.    Unless you have been directed to avoid the use of anti-inflammatory medications (NSAIDS-ibuprofen, Aleve, Motrin), you may use these medications or Tylenol for pain control if needed.     With diabetes, check your blood sugar more frequently than usual as your blood sugar may be higher than normal for 10-14 days following a steroid injection. Contact your doctor who manages your diabetes if your blood sugar is higher than usual    Possible side effects of steroids that you may experience include flushing, elevated blood pressure, increased appetite, mild headaches and restlessness.  All of these symptoms will get better with time.    It may take up to 14 days for the steroid medication to start working although you may feel the effect as early as a few days after the procedure.     Follow up with your referring provider in 2-3 weeks      If you experience any of the  following, call the pain center line during work hours at [phone] or on-call physician after hours at [phone]:  -Fever over 100 degree F  -Swelling, bleeding, redness, drainage, warmth at the injection site  -Progressive weakness or numbness in your legs   -Loss of bowel or bladder function  -Unusual headache that is not relieved by Tylenol or your regular headache medication  -Unusual new onset of pain that is not improving

## 2021-03-22 NOTE — PROGRESS NOTES
Pre-procedure Intake    Have you been fasting? NA    If yes, for how long?     Are you taking a prescribed blood thinner such as coumadin, Plavix, Xarelto?    No    If yes, when did you take your last dose?     Do you take aspirin?  No    If cervical procedure, have you held aspirin for 6 days?   NA    Do you have any allergies to contrast dye, iodine, steroid and/or numbing medications?  NO    Are you currently taking antibiotics or have an active infection?  NO    Have you had a fever/elevated temperature within the past week? NO    Are you currently taking oral steroids? NO    Do you have a ? Yes       Are you pregnant or breastfeeding?  Not Applicable    Are the vital signs normal?  No: B/P 140/84

## 2021-03-22 NOTE — PROGRESS NOTES
[facility] - Procedure Note    Date of Service: 3/22/2021    Procedure performed: Bilateral L4-5 transforaminal epidural steroid injection with fluoroscopic guidance  Diagnosis: Lumbar spondylosis; Lumbar radiculitis/radiculopathy  : Sully Reese MD   Anesthesia: None  Complications: None    Indications: Paco Eckert is a 75 year old male who was seen by me for an interventional consultation who presents today for lumbar transforaminal epidural steroid injection. The patient describes bilateral low back pain with intermittent radiation into the right anterior thigh and hip. The patient has been exhibiting symptoms consistent with lumbar intraspinal inflammation. Symptoms have been persistent, disabling, and intermittently severe. The patient reports minimal improvement with conservative treatment, including medications and exercise.    He previously had 2 right L4-5 Transforaminal epidural steroid injections in 2017 which provided good benefit for several months.      Lumbar MRI was done in December 2020 at [facility] which showed   FINDINGS:   Nomenclature is based on 5 lumbar type vertebral bodies. There is an   old anteriorly wedged compression deformity of L1. Vertebral body   heights are otherwise maintained. Posterior alignment is normal. Bone   marrow signal intensity is normal. Normal distal spinal cord and   cauda equina with conus medullaris at L1-L2. No extraspinal   abnormality. Unremarkable visualized bony pelvis.     T12-L1: Minimal disc bulge. Normal facets. No stenosis.      L1-L2: Minimal disc bulge. Mild facet hypertrophy. No stenosis.     L2-L3: Broad-based disc bulging and mild-to-moderate facet and   ligamentum flavum hypertrophy is present causing moderate central   canal stenosis and mild-to-moderate bilateral neural foraminal   stenosis. Prominent posterior epidural fat is contributing to the   central canal stenosis.      L3-L4: Broad-based  disc bulging and mild-to-moderate facet and   ligamentum flavum hypertrophy is present causing some moderate   central canal stenosis and moderate bilateral neural foraminal   stenosis.     L4-L5: Broad-based disc bulge with a more focal right posterior   lateral disc protrusion is present along with moderate to severe   facet hypertrophy. There is secondary moderate to severe central   canal stenosis, moderate to severe right-sided foraminal stenosis and   moderate left-sided foraminal stenosis.     L5-S1: Broad-based disc bulging with a more focal central disc   protrusion is present along with moderate facet hypertrophy. There is   secondary moderate bilateral neural foraminal stenosis and mild   central canal stenosis.     IMPRESSION: Multilevel degenerative disc and facet disease most   advanced at L4-L5 where there is secondary moderate to severe central   canal stenosis, moderate to severe right-sided foraminal stenosis and   moderate left-sided foraminal stenosis. Findings have not appreciably   changed since the prior study.    Allergies:      Allergies   Allergen Reactions     Seasonal Allergies         Vitals:  BP (!) 140/84   Pulse 70   SpO2 100%     Review of Systems: The patient denies recent fever, chills, illness, use of antibiotics or anticoagulants. All other 10-point review of systems negative.     Procedure: The procedure and risks were explained, and informed written consent was obtained from the patient. Risks include but are not limited to: infection, bleeding, increased pain, and damage to soft tissue, nerve, muscle, and vasculature structures. After getting informed consent, patient was brought into the procedure suite and was placed in a prone position on the procedure table. A Pause for the Cause was performed. Patient was prepped and draped in sterile fashion.     After identifying the bilateral L4-5 neuroforamen, the C-arm was rotated to a bilateral lateral oblique angle.  A total of 6  mL of Lidocaine 1% was used to anesthetize the skin and the needle track at a skin entry site coaxial with the fluoroscopy beam, and overriding the superior aspect of the neuroforamen.  A 22 gauge 5 inch spinal needle was advanced under intermittent fluoroscopy until it entered the foramen superiorly.    The position was then inspected from anteroposterior and lateral views, and the needle adjusted appropriately.  After negative aspiration, a total of 1.5 mL of Omnipaque-300 was injected using static and continuous fluoroscopy confirming appropriate position, with spread along the nerve root sheath and into the epidural space, with no intravascular or intrathecal uptake. 8.5 mL of Omnipaque-300 was wasted.    2 mL of 1% lidocaine with 20 mg of dexamethasone was injected equally between both sides.  The needle was removed. Hemostasis was achieved, the area was cleaned, and bandaids were placed when appropriate. Images were saved to PACS.    The patient tolerated the procedure well, and was taken to the recovery room, and there was no evidence of procedural complications. No new sensory or motor deficits were noted following the procedure. The patient was stable and able to ambulate on discharge home. Post-procedure instructions were provided.     Pre-procedure pain score: 3/10 in the back, 3/10 in the leg  Post-procedure pain score: 0/10 in the back, 1/10 in the leg    Assessment/Plan: Paco Eckert is a 75 year old male s/p bilateral L4-5 transforaminal epidural steroid injection today for lumbar spondylosis, radiculitis/radiculopathy.     1. Following today's procedure, the patient was advised to contact the [facility] for any of the following:   Fever, chills, or night sweats   New onset of pain, numbness, or weakness   Any questions/concerns regarding the procedure  If unable to contact the Pain Center, the patient was instructed to go to a local Emergency Room for any complications.   2.  The patient will receive a follow-up call in 1 week.  3. The patient should follow-up with the referring provider in 2 weeks for post-procedure evaluation.      Sully Reese MD  [facility] [place]

## 2021-03-22 NOTE — NURSING NOTE
Discharge Information    IV Discontiued Time:  NA    Amount of Fluid Infused:  NA    Discharge Criteria = When patient returns to baseline or as per MD order    Consciousness:  Pt is fully awake    Circulation:  BP +/- 20% of pre-procedure level    Respiration:  Patient is able to breathe deeply    O2 Sat:  Patient is able to maintain O2 Sat >92% on room air    Activity:  Moves 4 extremities on command    Ambulation:  Patient is able to stand and walk or stand and pivot into wheelchair    Dressing:  Clean/dry or No Dressing    Notes:   Discharge instructions and AVS given to patient    Patient meets criteria for discharge?  YES    Admitted to PCU?  No    Responsible adult present to accompany patient home?  Yes    Signature/Title:    Evelina Han RN  RN Care Coordinator  [facility] [place]

## 2021-04-06 ENCOUNTER — IMMUNIZATION (OUTPATIENT)
Dept: NURSING | Facility: CLINIC | Age: 76
End: 2021-04-06
Attending: INTERNAL MEDICINE
Payer: COMMERCIAL

## 2021-04-06 PROCEDURE — 0002A PR COVID VAC PFIZER DIL RECON 30 MCG/0.3 ML IM: CPT

## 2021-04-06 PROCEDURE — 91300 PR COVID VAC PFIZER DIL RECON 30 MCG/0.3 ML IM: CPT

## 2021-10-23 ENCOUNTER — HEALTH MAINTENANCE LETTER (OUTPATIENT)
Age: 76
End: 2021-10-23

## 2021-11-11 ENCOUNTER — OFFICE VISIT (OUTPATIENT)
Dept: PEDIATRICS | Facility: CLINIC | Age: 76
End: 2021-11-11
Payer: COMMERCIAL

## 2021-11-11 VITALS
HEIGHT: 65 IN | RESPIRATION RATE: 20 BRPM | WEIGHT: 204 LBS | HEART RATE: 80 BPM | TEMPERATURE: 98.6 F | SYSTOLIC BLOOD PRESSURE: 110 MMHG | DIASTOLIC BLOOD PRESSURE: 80 MMHG | BODY MASS INDEX: 33.99 KG/M2

## 2021-11-11 DIAGNOSIS — E78.5 HYPERLIPIDEMIA LDL GOAL <130: ICD-10-CM

## 2021-11-11 DIAGNOSIS — Z13.220 SCREENING FOR HYPERLIPIDEMIA: ICD-10-CM

## 2021-11-11 DIAGNOSIS — R35.1 NOCTURIA: ICD-10-CM

## 2021-11-11 DIAGNOSIS — M85.859 OSTEOPENIA OF NECK OF FEMUR, UNSPECIFIED LATERALITY: ICD-10-CM

## 2021-11-11 DIAGNOSIS — M81.0 AGE-RELATED OSTEOPOROSIS WITHOUT CURRENT PATHOLOGICAL FRACTURE: ICD-10-CM

## 2021-11-11 DIAGNOSIS — Z23 NEED FOR COVID-19 VACCINE: ICD-10-CM

## 2021-11-11 DIAGNOSIS — Z23 NEED FOR INFLUENZA VACCINATION: ICD-10-CM

## 2021-11-11 DIAGNOSIS — Z00.00 ENCOUNTER FOR MEDICARE ANNUAL WELLNESS EXAM: Primary | ICD-10-CM

## 2021-11-11 DIAGNOSIS — I10 ESSENTIAL HYPERTENSION WITH GOAL BLOOD PRESSURE LESS THAN 140/90: ICD-10-CM

## 2021-11-11 LAB
CHOLEST SERPL-MCNC: 141 MG/DL
FASTING STATUS PATIENT QL REPORTED: YES
HDLC SERPL-MCNC: 30 MG/DL
LDLC SERPL CALC-MCNC: 72 MG/DL
NONHDLC SERPL-MCNC: 111 MG/DL
TRIGL SERPL-MCNC: 194 MG/DL

## 2021-11-11 PROCEDURE — 99397 PER PM REEVAL EST PAT 65+ YR: CPT | Mod: GC | Performed by: STUDENT IN AN ORGANIZED HEALTH CARE EDUCATION/TRAINING PROGRAM

## 2021-11-11 PROCEDURE — 80061 LIPID PANEL: CPT

## 2021-11-11 PROCEDURE — 0004A COVID-19,PF,PFIZER (12+ YRS): CPT | Performed by: INTERNAL MEDICINE

## 2021-11-11 PROCEDURE — 36415 COLL VENOUS BLD VENIPUNCTURE: CPT

## 2021-11-11 PROCEDURE — 90662 IIV NO PRSV INCREASED AG IM: CPT | Performed by: STUDENT IN AN ORGANIZED HEALTH CARE EDUCATION/TRAINING PROGRAM

## 2021-11-11 PROCEDURE — 91300 COVID-19,PF,PFIZER (12+ YRS): CPT | Performed by: INTERNAL MEDICINE

## 2021-11-11 PROCEDURE — 99213 OFFICE O/P EST LOW 20 MIN: CPT | Mod: 25 | Performed by: STUDENT IN AN ORGANIZED HEALTH CARE EDUCATION/TRAINING PROGRAM

## 2021-11-11 PROCEDURE — G0008 ADMIN INFLUENZA VIRUS VAC: HCPCS | Performed by: STUDENT IN AN ORGANIZED HEALTH CARE EDUCATION/TRAINING PROGRAM

## 2021-11-11 RX ORDER — SIMVASTATIN 40 MG
40 TABLET ORAL AT BEDTIME
Qty: 90 TABLET | Refills: 3 | Status: SHIPPED | OUTPATIENT
Start: 2021-11-11 | End: 2022-11-11

## 2021-11-11 RX ORDER — FINASTERIDE 5 MG/1
5 TABLET, FILM COATED ORAL DAILY
Qty: 90 TABLET | Refills: 0 | Status: CANCELLED | OUTPATIENT
Start: 2021-11-11

## 2021-11-11 RX ORDER — LISINOPRIL 10 MG/1
10 TABLET ORAL DAILY
Qty: 90 TABLET | Refills: 3 | Status: SHIPPED | OUTPATIENT
Start: 2021-11-11 | End: 2022-11-11

## 2021-11-11 RX ORDER — TAMSULOSIN HYDROCHLORIDE 0.4 MG/1
0.4 CAPSULE ORAL DAILY
Qty: 90 CAPSULE | Refills: 0 | Status: SHIPPED | OUTPATIENT
Start: 2021-11-11 | End: 2022-02-03

## 2021-11-11 ASSESSMENT — ACTIVITIES OF DAILY LIVING (ADL): CURRENT_FUNCTION: NO ASSISTANCE NEEDED

## 2021-11-11 ASSESSMENT — MIFFLIN-ST. JEOR: SCORE: 1587.22

## 2021-11-11 NOTE — PATIENT INSTRUCTIONS
- Take calcium and vitamin D.  - Follow up with urology.    Patient Education   Personalized Prevention Plan  You are due for the preventive services outlined below.  Your care team is available to assist you in scheduling these services.  If you have already completed any of these items, please share that information with your care team to update in your medical record.  Health Maintenance Due   Topic Date Due     AORTIC ANEURYSM SCREENING (SYSTEM ASSIGNED)  Never done     Osteoporosis Screening  10/29/2018     PHQ-2  01/01/2021     Flu Vaccine (1) 09/01/2021     COVID-19 Vaccine (3 - Booster for Pfizer series) 10/06/2021       Urinary Incontinence (Male)    Urinary incontinence means not being able to control the release of urine from the bladder.   Causes  Common causes of urinary incontinence in men include:    Infection    Certain medicines    Aging    Poor pelvic muscle tone    Bladder spasms    Obesity    Trouble urinating and fully emptying the bladder (urinary retention)  Other things that can cause incontinence are:     Nervous system diseases    Diabetes    Sleep apnea    Urinary tract infections    Prostate surgery    Pelvic injury  Constipation and smoking have also been identified as risk factors.   Symptoms    Urge incontinence (overactive bladder). This is a sudden urge to urinate. It occurs even though there may not be much urine in the bladder. The need to urinate often during the night is common. It's due to bladder spasms.    Stress incontinence. This is urine leakage that you can't control. It can occur with sneezing, coughing, and other actions that put stress on the bladder.    Treatment  Treatment depends on what is causing the condition. Bladder infections are treated with antibiotics. Urinary retention is treated with a bladder catheter.   Home care  Follow these guidelines when caring for yourself at home:    Don't have any foods and drinks that may irritate the bladder. This  includes:  ? Chocolate  ? Alcohol  ? Caffeine  ? Carbonated drinks  ? Acidic fruits and juices    Limit fluids to 6 to 8 cups a day.    Lose weight if you are overweight. This will reduce your symptoms.    If advised, do regular pelvic muscle-strengthening exercises such as Kegel exercises.    If needed, wear absorbent pads to catch urine. Change the pads often. This is for good hygiene and to prevent skin and bladder infections.    Bathe daily for good hygiene.    If an antibiotic was prescribed to treat a bladder infection, take it until it's finished. Keep taking it even if you are feeling better. This is to make sure your infection has cleared.    If a catheter was left in place, keep bacteria from getting into the collection bag. Don't disconnect the catheter from the collection bag.    Use a leg band to secure the catheter drainage tube, so it does not pull on the catheter. Drain the collection bag when it becomes full. To do this, use the drain spout at the bottom of the bag. Don't disconnect the bag from the catheter.    Don't pull on or try to remove a catheter. The catheter must be removed by a healthcare provider.    If you smoke, stop. Ask your provider for help if you can't do this on your own.  Follow-up care  Follow up with your healthcare provider, or as advised.  When to get medical advice  Call your healthcare provider right away if any of these occur:    Fever over 100.4 F (38 C), or as directed by your provider    Bladder pain or fullness    Belly swelling, nausea, or vomiting    Back pain    Weakness, dizziness, or fainting    If a catheter was left in place, return if:  ? The catheter falls out  ? The catheter stops draining for 6 hours  ? Your urine gets cloudy or smells bad  Raeann last reviewed this educational content on 1/1/2020 2000-2021 [employer]. All rights reserved. This information is not intended as a substitute for professional medical care. Always follow your  healthcare professional's instructions.        Your Health Risk Assessment indicates you feel you are not in good emotional health.    Recreation   Recreation is not limited to sports and team events. It includes any activity that provides relaxation, interest, enjoyment, and exercise. Recreation provides an outlet for physical, mental, and social energy. It can give a sense of worth and achievement. It can help you stay healthy.    Mental Exercise and Social Involvement  Mental and emotional health is as important as physical health. Keep in touch with friends and family. Stay as active as possible. Continue to learn and challenge yourself.   Things you can do to stay mentally active are:    Learn something new, like a foreign language or musical instrument.     Play SCRABBLE or do crossword puzzles. If you cannot find people to play these games with you at home, you can play them with others on your computer through the Internet.     Join a games club--anything from card games to chess or checkers or lawn bowling.     Start a new hobby.     Go back to school.     Volunteer.     Read.   Keep up with world events.

## 2021-11-11 NOTE — PROGRESS NOTES
SUBJECTIVE:    Mr. Paco Eckert is a 76 year old male who presents for Preventive Visit.      Patient has been advised of split billing requirements and indicates understanding: Yes   Are you in the first 12 months of your Medicare coverage?  No    1. Urinary symptoms  - Wakes up 3-4 times to void in the night  - Having some urinary leakage during the day  - Had tried a medication in the past that did not seem to help, unsure of the name (tadalafil?)  - Has a history of prostate cancer; received radiation and chemotherapy implants ~10 years ago    2. Sleep  - Friend uses trazodone for sleep, curious to try  - Tried melatonin and Nature's Bounty without relief  - Notably attributes wakening at night to nocturia, does not describe insomnia    3. Back  - Chronic low back pain  - Received steroid injection to back ~6 months ago and ~1-2 yrs ago  - Helpful for ~2 months  - Received in pain clinic in [place]  - Has now been exercising more but had not more recently  - Does not use medications or ice/heat packs    Healthy Habits:    In general, how would you rate your overall health?  Good    Frequency of exercise:  6-7 days/week    Duration of exercise:  45-60 minutes    Taking medications regularly:  Yes    Barriers to taking medications:  Not applicable    Medication side effects:  Not applicable    Ability to successfully perform activities of daily living:  No assistance needed    Home Safety:  No safety concerns identified    Hearing impairment symptoms: wears bilateral hearing devices.    In the past 6 months, have you been bothered by leaking of urine? Yes    In general, how would you rate your overall mental or emotional health?  Fair      PHQ-2 Total Score:    Additional concerns today:: referral to urology.    Do you feel safe in your environment? Yes    Have you ever done Advance Care Planning? (For example, a Health Directive, POLST, or a discussion with a medical provider or your loved ones about your  wishes): Yes, advance care planning is on file.    Fall risk  Fallen 2 or more times in the past year?: No  Any fall with injury in the past year?: No    click delete button to remove this line now  Cognitive Screening   1) Repeat 3 items (Leader, Season, Table)    2) Clock draw: NORMAL  3) 3 item recall: Recalls 3 objects  Results: 3 items recalled: COGNITIVE IMPAIRMENT LESS LIKELY    Mini-CogTM Copyright KATIE Aranda. Licensed by the author for use in [facility]; reprinted with permission (fara@[facility]). All rights reserved.      Do you have sleep apnea, excessive snoring or daytime drowsiness?: yes    Reviewed and updated as needed this visit by clinical staff  Tobacco  Allergies  Meds   Med Hx  Surg Hx  Fam Hx  Soc Hx     Reviewed and updated as needed this visit by Provider               Social History     Tobacco Use     Smoking status: Never Smoker     Smokeless tobacco: Never Used   Substance Use Topics     Alcohol use: Yes     Alcohol/week: 0.0 standard drinks     Comment: 10-12 drinks per week     If you drink alcohol do you typically have >3 drinks per day or >7 drinks per week? No    Alcohol Use 11/14/2019   Prescreen: >3 drinks/day or >7 drinks/week? No   Prescreen: >3 drinks/day or >7 drinks/week? -   No flowsheet data found.    Current providers sharing in care for this patient include:   Patient Care Team:  Emeka Hale MD as PCP - General  Emeka Hale MD as Assigned PCP  Gerry Parikh DO as Assigned Sleep Provider    The following health maintenance items are reviewed in Epic and correct as of today:  Health Maintenance Due   Topic Date Due     AORTIC ANEURYSM SCREENING (SYSTEM ASSIGNED)  Never done     DEXA  10/29/2018     PHQ-2  01/01/2021     INFLUENZA VACCINE (1) 09/01/2021     COVID-19 Vaccine (3 - Booster for Pfizer series) 10/06/2021     BP Readings from Last 3 Encounters:   11/11/21 110/80   03/22/21 (!) 141/76   03/02/21 (!) 144/83    Wt Readings from Last 3  Encounters:   11/11/21 92.5 kg (204 lb)   03/02/21 92.7 kg (204 lb 6.4 oz)   12/14/20 90.7 kg (200 lb)          Patient Active Problem List   Diagnosis     Malignant neoplasm of prostate (H)     Gouty arthropathy     Other psoriasis     Osteoporosis     Leukopenia     Frequency of urination and polyuria     HYPERLIPIDEMIA LDL GOAL <130     Impaired fasting glucose     Fatty liver     Hypertension goal BP (blood pressure) < 140/90     Rectal cancer (H)     Cervical pain     Past Surgical History:   Procedure Laterality Date     APPLY WOUND VAC N/A 10/21/2014    Procedure: APPLY WOUND VAC;  Surgeon: Mir Hernandez MD;  Location:  OR     COLONOSCOPY N/A 10/10/2014    Procedure: COMBINED COLONOSCOPY, SINGLE BIOPSY/POLYPECTOMY BY BIOPSY;  Surgeon: Mir Hernandez MD;  Location:  GI     COLOSTOMY N/A 10/21/2014    Procedure: COLOSTOMY;  Surgeon: Mir Hernandez MD;  Location:  OR     ORTHOPEDIC SURGERY Bilateral 2000 ?    rotater repair both     ORTHOPEDIC SURGERY Right     ankle      RESECTION ABDOMINAL PERINEAL N/A 10/21/2014    Procedure: RESECTION ABDOMINAL PERINEAL;  Surgeon: Mir Hernandez MD;  Location:  OR       Social History     Tobacco Use     Smoking status: Never Smoker     Smokeless tobacco: Never Used   Substance Use Topics     Alcohol use: Yes     Alcohol/week: 0.0 standard drinks     Comment: 10-12 drinks per week     Family History   Problem Relation Age of Onset     Diabetes Brother      Sleep Apnea Brother      Diabetes Sister      Sleep Apnea Sister      Hypertension Sister      Hypertension Brother      Snoring Father          Current Outpatient Medications   Medication Sig Dispense Refill     lisinopril (ZESTRIL) 10 MG tablet Take 1 tablet (10 mg) by mouth daily 90 tablet 3     simvastatin (ZOCOR) 40 MG tablet Take 1 tablet (40 mg) by mouth At Bedtime 90 tablet 3     tamsulosin (FLOMAX) 0.4 MG capsule Take 1 capsule (0.4 mg) by mouth daily 90 capsule 0     Allergies   Allergen  "Reactions     Seasonal Allergies      Recent Labs   Lab Test 12/08/20  0901 11/14/19  1037 10/26/18  0957   LDL 66 84 116*   HDL 26* 28* 31*   TRIG 295* 166* 312*   ALT 25 16 21   CR 0.89 0.97 0.98   GFRESTIMATED 84 77 75   GFRESTBLACK >90 89 >90   POTASSIUM 4.9 4.7 5.0      Review of Systems  10-point review of systems negative except for as listed above or below.    OBJECTIVE:   /80   Pulse 80   Temp 98.6  F (37  C) (Oral)   Resp 20   Ht 1.651 m (5' 5\")   Wt 92.5 kg (204 lb)   BMI 33.95 kg/m   Estimated body mass index is 32.5 kg/m  as calculated from the following:    Height as of 12/14/20: 1.689 m (5' 6.5\").    Weight as of 3/2/21: 92.7 kg (204 lb 6.4 oz).     Physical Exam  GENERAL: healthy, alert, no distress  EYES: eyes grossly normal to inspection, PER, and conjunctivae and sclerae normal  RESP: lungs clear to auscultation - no rales, rhonchi, or wheezes  CV: regular rate and rhythm, normal S1 S2, no S3 or S4, no murmur, click, or rub, no peripheral edema and peripheral pulses strong  ABDOMEN: obese, soft, non-tender, no hepatosplenomegaly, no masses, bowel sounds normal, left-sided ostomy bag in place  MS: no gross musculoskeletal defects noted, no edema  SKIN: no suspicious lesions or rashes on visible skin  NEURO: awake, alert, oriented to conversation; moving all 4 extremities; normal gait; mentation intact and speech normal  PSYCH: mentation appears normal, affect normal/bright    ASSESSMENT / PLAN:   Mr. Paco Eckert is a 76-year-old man with a history most notable for prostate cancer status-post radiation and chemotherapy who presents for routine annual physical exam.  Hemodynamically, vital signs within normal limits. BMI elevated to 35 kg/m . Clinically, see below.    1. Nocturia  DDx includes benign prostatic hyperplasia v. recurrence of prostate cancer.  - Start tamsulosin 0.4 mg daily.  - Urology referral    2. History of osteopenia (due to leuprolide for prostate ceancer)  Last " "DEXA scan on 10/29/2015. Reportedly stopped taking alendronic acid in 2011.  - DEXA scan    3. Routine health  - [employer] COVID booster vaccine  - Influenza vaccine  - Provided POLST form and information    4. Hypertension  - Refilled lisinopril 10 mg daily    5. Hyperlipidemia  - Refilled simvastatin 40 mg nightly      ICD-10-CM    1. Encounter for Medicare annual wellness exam  Z00.00    2. Nocturia  R35.1 tamsulosin (FLOMAX) 0.4 MG capsule     Adult Urology Referral   3. Osteopenia of neck of femur, unspecified laterality  M85.859 DEXA HIP/PELVIS/SPINE - Future   4. Age-related osteoporosis without current pathological fracture   M81.0 DEXA HIP/PELVIS/SPINE - Future   5. Need for influenza vaccination  Z23 INFLUENZA, QUAD, HIGH DOSE, PF, 65YR + (FLUZONE HD)   6. Need for COVID-19 vaccine  Z23    7. Screening for hyperlipidemia  Z13.220 Lipid panel reflex to direct LDL Fasting   8. Essential hypertension with goal blood pressure less than 140/90  I10 lisinopril (ZESTRIL) 10 MG tablet   9. Hyperlipidemia LDL goal <130  E78.5 simvastatin (ZOCOR) 40 MG tablet     Patient has been advised of split billing requirements and indicates understanding: No  COUNSELING:  Reviewed preventive health counseling, as reflected in patient instructions       Regular exercise       Colon cancer screening    Estimated body mass index is 32.5 kg/m  as calculated from the following:    Height as of 12/14/20: 1.689 m (5' 6.5\").    Weight as of 3/2/21: 92.7 kg (204 lb 6.4 oz).    Weight management plan: Discussed healthy diet and exercise guidelines    He reports that he has never smoked. He has never used smokeless tobacco.    Appropriate preventive services were discussed with this patient, including applicable screening as appropriate for cardiovascular disease, diabetes, osteopenia/osteoporosis, and glaucoma.  As appropriate for age/gender, discussed screening for colorectal cancer, prostate cancer, breast cancer, and cervical cancer. " Checklist reviewing preventive services available has been given to the patient.    Reviewed patients plan of care and provided an AVS. The Basic Care Plan (routine screening as documented in Health Maintenance) for Paco meets the Care Plan requirement. This Care Plan has been established and reviewed with the Patient.    Counseling Resources:  ATP IV Guidelines  Pooled Cohorts Equation Calculator  Breast Cancer Risk Calculator  Breast Cancer: Medication to Reduce Risk  FRAX Risk Assessment  ICSI Preventive Guidelines  Dietary Guidelines for Americans, 2010  [facility]'s MyPlate  ASA Prophylaxis  Lung CA Screening    Jeff Soto MD  PGY-4 Internal Medicine/Pediatrics  Pager [phone]  Thursday 11/11/2021  [facility]    Patient staffed with the attending physician, Dr. Hale.    Identified Health Risks:    Information on urinary incontinence and treatment options given to patient.  The patient was provided with suggestions to help him develop a healthy emotional lifestyle.    I have seen this patient and I was present during all critical and key portions of the procedure/exam and immediately available to furnish services during the entire service. I have reviewed the documentation from this resident and discussed the findings with them, and I agree with the documentation their documentation.      Emeka Hale MD  Internal Medicine and Pediatrics

## 2021-11-12 ENCOUNTER — ANCILLARY PROCEDURE (OUTPATIENT)
Dept: BONE DENSITY | Facility: CLINIC | Age: 76
End: 2021-11-12
Attending: INTERNAL MEDICINE
Payer: COMMERCIAL

## 2021-11-12 DIAGNOSIS — M81.0 AGE-RELATED OSTEOPOROSIS WITHOUT CURRENT PATHOLOGICAL FRACTURE: ICD-10-CM

## 2021-11-12 DIAGNOSIS — M85.859 OSTEOPENIA OF NECK OF FEMUR, UNSPECIFIED LATERALITY: ICD-10-CM

## 2021-11-12 PROCEDURE — 77085 DXA BONE DENSITY AXL VRT FX: CPT | Performed by: INTERNAL MEDICINE

## 2021-11-30 ENCOUNTER — TELEPHONE (OUTPATIENT)
Dept: PEDIATRICS | Facility: CLINIC | Age: 76
End: 2021-11-30
Payer: COMMERCIAL

## 2021-11-30 NOTE — TELEPHONE ENCOUNTER
No, we do not order epidural steroid injections.     He needs to call the pain clinic in [place] to discuss setting this up with them.    Emeka Hale MD  Internal Medicine and Pediatrics

## 2021-11-30 NOTE — TELEPHONE ENCOUNTER
Call placed to the [facility] pain clinic  They do not need a referral since pt has been seen there and had an WALTER  They will send the message to the ordering MD then their scheduling will call the pt    Call placed to pt to let him know that someone will be contacting him  Pt verbalized understanding and agrees to the plan    Thank you  Darcy Mar, RN on 11/30/2021 at 1:46 PM

## 2021-11-30 NOTE — TELEPHONE ENCOUNTER
Dr. Hale,    Received call from pt   At his last visit on 11/11/21 pt had thought that a referral for an WALTER was going to be sent to the  pain clinic in [place].    Pt is going to [place] in mid January and would like to have this done prior    Thank you  Darcy Mar RN on 11/30/2021 at 12:41 PM

## 2021-12-20 ENCOUNTER — RADIOLOGY INJECTION OFFICE VISIT (OUTPATIENT)
Dept: PALLIATIVE MEDICINE | Facility: CLINIC | Age: 76
End: 2021-12-20
Attending: PSYCHIATRY & NEUROLOGY
Payer: COMMERCIAL

## 2021-12-20 VITALS — OXYGEN SATURATION: 98 % | HEART RATE: 96 BPM | SYSTOLIC BLOOD PRESSURE: 136 MMHG | DIASTOLIC BLOOD PRESSURE: 74 MMHG

## 2021-12-20 DIAGNOSIS — M54.16 LUMBAR RADICULOPATHY: Primary | ICD-10-CM

## 2021-12-20 PROCEDURE — 64483 NJX AA&/STRD TFRM EPI L/S 1: CPT | Mod: 50 | Performed by: PHYSICAL MEDICINE & REHABILITATION

## 2021-12-20 RX ORDER — DEXAMETHASONE SODIUM PHOSPHATE 10 MG/ML
20 INJECTION INTRAMUSCULAR; INTRAVENOUS ONCE
Status: COMPLETED | OUTPATIENT
Start: 2021-12-20 | End: 2021-12-20

## 2021-12-20 RX ADMIN — DEXAMETHASONE SODIUM PHOSPHATE 20 MG: 10 INJECTION INTRAMUSCULAR; INTRAVENOUS at 13:34

## 2021-12-20 NOTE — NURSING NOTE
Pre-procedure Intake    If YES to any questions or NO to having a   Please complete laminated checklist and leave on the computer keyboard for Provider, verbally inform provider if able.    For SCS Trial, RFA's or any sedation procedure: NA    If yes, for how long?         Are you taking any any blood thinners such as Coumadin, Warfarin, Jantoven, Pradaxa Xarelto, Eliquis, Edoxaban, Enoxaparin, Lovenox, Heparin, Arixtra, Fondaparinux, or Fragmin? OR Antiplatelet medication such as Plavix, Brilinta, or Effient?  NO     If yes, when did you take your last dose?        Do you take aspirin?   NO    If cervical procedure, have you held aspirin for 6 days?   NA    Do you have any allergies to contrast dye, iodine, steroid and/or numbing medications?  NO     Are you currently taking antibiotics or have an active infection?  NO     Have you had a fever/elevated temperature within the past week? NO     Are you currently taking oral steroids? NO     Do you have a ? Yes     Are you pregnant or breastfeeding? NA     Have you received the COVID-19 vaccine? Yes     If yes, was it your 1st, 2nd or only dose needed? Booster dose 11/11/21    Notify provider and RNs if systolic BP >170, diastolic BP >100, P >100 or O2 sats < 90%

## 2021-12-20 NOTE — PATIENT INSTRUCTIONS
[facility] Procedure Discharge Instructions    Today you saw: Dr. Gigi Cage    Your procedure:  Epidural steroid injection      Medications used:  Lidocaine (anesthetic)  Dexamethasone (steroid)       Omnipaque (contrast)            Be cautious when walking as numbness and/or weakness in the legs may occur up to 6-8 hours after the procedure due to effect of the local anesthetic    Do not drive for 6 hours. The effect of the local anesthetic could slow your reflexes.     Avoid strenuous activity for the first 24 hours. You may resume your regular activities after that.     You may shower, however avoid swimming, tub baths or hot tubs for 24 hours following your procedure    You may have a mild to moderate increase in pain for several days following the injection.      You may use ice packs for 10-15 minutes, 3 to 4 times a day at the injection site for comfort    Do not use heat to painful areas for 6 to 8 hours. This will give the local anesthetic time to wear off and prevent you from accidentally burning your skin.    Unless you have been directed to avoid the use of anti-inflammatory medications (NSAIDS-ibuprofen, Aleve, Motrin), you may use these medications or Tylenol for pain control if needed.     With diabetes, check your blood sugar more frequently than usual as your blood sugar may be higher than normal for 10-14 days following a steroid injection. Contact your doctor who manages your diabetes if your blood sugar is higher than usual    Possible side effects of steroids that you may experience include flushing, elevated blood pressure, increased appetite, mild headaches and restlessness.  All of these symptoms will get better with time.    It may take up to 14 days for the steroid medication to start working although you may feel the effect as early as a few days after the procedure.     Follow up with your referring provider in 2-3 weeks      If you experience any of the  following, call the pain center line during work hours at [phone] or on-call physician after hours at [phone]:  -Fever over 100 degree F  -Swelling, bleeding, redness, drainage, warmth at the injection site  -Progressive weakness or numbness in your legs or arms  -Loss of bowel or bladder function  -Unusual headache that is not relieved by Tylenol or your regular headache medication  -Unusual new onset of pain that is not improving

## 2021-12-20 NOTE — PROGRESS NOTES
[facility] - Procedure Note    Date of Service: 12/20/2021    Procedure performed: Bilateral L4 transforaminal epidural steroid injection with fluoroscopic guidance  Diagnosis: Lumbar spondylosis; Lumbar radiculitis/radiculopathy  : Gigi Cage DO   Anesthesia: none    Indications: Paco Eckert is a 76 year old male who is seen at the request of Dr. Jay for lumbar transforaminal epidural steroid injection. The patient describes chronic bilateral low back, hip and thigh pain. The patient has been exhibiting symptoms consistent with lumbar intraspinal inflammation and radiculopathy. Symptoms have been persistent, disabling, and intermittently severe. The patient reports minimal improvement with conservative treatment, including oral medications and exercises.    They had bilateral L4 alin on 3/22/21 with significant pain relief for about 3 months.    Lumbar MRI was done in December 2020 at [facility] which showed   FINDINGS:   Nomenclature is based on 5 lumbar type vertebral bodies. There is an   old anteriorly wedged compression deformity of L1. Vertebral body   heights are otherwise maintained. Posterior alignment is normal. Bone   marrow signal intensity is normal. Normal distal spinal cord and   cauda equina with conus medullaris at L1-L2. No extraspinal   abnormality. Unremarkable visualized bony pelvis.     T12-L1: Minimal disc bulge. Normal facets. No stenosis.      L1-L2: Minimal disc bulge. Mild facet hypertrophy. No stenosis.     L2-L3: Broad-based disc bulging and mild-to-moderate facet and   ligamentum flavum hypertrophy is present causing moderate central   canal stenosis and mild-to-moderate bilateral neural foraminal   stenosis. Prominent posterior epidural fat is contributing to the   central canal stenosis.      L3-L4: Broad-based disc bulging and mild-to-moderate facet and   ligamentum flavum hypertrophy is present causing some moderate    central canal stenosis and moderate bilateral neural foraminal   stenosis.     L4-L5: Broad-based disc bulge with a more focal right posterior   lateral disc protrusion is present along with moderate to severe   facet hypertrophy. There is secondary moderate to severe central   canal stenosis, moderate to severe right-sided foraminal stenosis and   moderate left-sided foraminal stenosis.     L5-S1: Broad-based disc bulging with a more focal central disc   protrusion is present along with moderate facet hypertrophy. There is   secondary moderate bilateral neural foraminal stenosis and mild   central canal stenosis.     IMPRESSION: Multilevel degenerative disc and facet disease most   advanced at L4-L5 where there is secondary moderate to severe central   canal stenosis, moderate to severe right-sided foraminal stenosis and   moderate left-sided foraminal stenosis. Findings have not appreciably   changed since the prior study.    Allergies:      Allergies   Allergen Reactions     Seasonal Allergies         Vitals:  /74   Pulse 96   SpO2 98%     Review of Systems: The patient denies recent fever, chills, illness, use of antibiotics or anticoagulants. All other 10-point review of systems negative.     Procedure: The procedure and risks were explained, and informed written consent was obtained from the patient. Risks include but are not limited to: infection, bleeding, increased pain, and damage to soft tissue, nerve, muscle, and vasculature structures. After getting informed consent, patient was brought into the procedure suite and was placed in a prone position on the procedure table. A Pause for the Cause was performed. Patient was prepped and draped in sterile fashion.     After identifying the left L4 neuroforamen, the C-arm was rotated to a left lateral oblique angle.  A total of 3 mL of Lidocaine 1% was used to anesthetize the skin and the needle track at a skin entry site coaxial with the fluoroscopy  beam, and overriding the superior aspect of the neuroforamen.  A 25 gauge 5 inch spinal needle was advanced under intermittent fluoroscopy until it entered the foramen superiorly.    The position was then inspected from anteroposterior and lateral views, and the needle adjusted appropriately.  After negative aspiration,  Omnipaque-300 was injected using static and continuous fluoroscopy confirming appropriate position, with spread along the nerve root sheath and into the epidural space, with no intravascular or intrathecal uptake.     1mL of 1% lidocaine with 10 mg of dexamethasone was injected.  The needle was removed. Hemostasis was achieved, the area was cleaned, and bandaids were placed when appropriate. Images were saved to PACS.    The exact procedure was repeated on the right.    8 mL of Omnipaque-300 was wasted.    The patient tolerated the procedure well, and was taken to the recovery room, and there was no evidence of procedural complications. No new sensory or motor deficits were noted following the procedure. The patient was stable and able to ambulate on discharge home. Post-procedure instructions were provided.     Pre-procedure pain score: 1/10 in the back, 1/10 in the leg (worsens with activity)  Post-procedure pain score: 1/10 in the back, 1/10 in the leg    Assessment/Plan: Paco Eckert is a 76 year old male s/p Bilateral L4 transforaminal epidural steroid injection today for lumbar spondylosis, radiculitis/radiculopathy.     1. Following today's procedure, the patient was advised to contact the [facility] for any of the following:   Fever, chills, or night sweats   New onset of pain, numbness, or weakness   Any questions/concerns regarding the procedure  If unable to contact the Pain Center, the patient was instructed to go to a local Emergency Room for any complications.   2. The patient should follow-up with the referring provider in 2-4 weeks for post-procedure  evaluation.        Gigi Cage [facility] [place]

## 2021-12-20 NOTE — NURSING NOTE
Discharge Information    IV Discontiued Time:  NA    Amount of Fluid Infused:  NA    Discharge Criteria = When patient returns to baseline or as per MD order    Consciousness:  Pt is fully awake    Circulation:  BP +/- 20% of pre-procedure level    Respiration:  Patient is able to breathe deeply    O2 Sat:  Patient is able to maintain O2 Sat >92% on room air    Activity:  Moves 4 extremities on command    Ambulation:  Patient is able to stand and walk or stand and pivot into wheelchair    Dressing:  Clean/dry or No Dressing    Notes:   Discharge instructions and AVS given to patient    Patient meets criteria for discharge?  YES    Admitted to PCU?  No    Responsible adult present to accompany patient home?  Yes    Signature/Title:    Lyubov Solis RN  RN Care Coordinator  [facility] [place]

## 2021-12-21 ENCOUNTER — TELEPHONE (OUTPATIENT)
Dept: PALLIATIVE MEDICINE | Facility: CLINIC | Age: 76
End: 2021-12-21
Payer: COMMERCIAL

## 2021-12-21 NOTE — TELEPHONE ENCOUNTER
Sorry to hear that he is having more episodes of leaking. Sounds like stress incontinence, This does not sound like it is procedure/complication related especially given lack of other symptoms.    Agree with recs to follow up with PCP/urology.     Gigi Cage DO  [facility]

## 2021-12-21 NOTE — TELEPHONE ENCOUNTER
Called pt. Advised per below.     Evelina AYALAN, RN Care Coordinator  [facility]  Pain [facility]

## 2021-12-21 NOTE — TELEPHONE ENCOUNTER
Routing to provider, please advise any follow up needed    Calling to discuss with nursing increased episodes of urinary leakage when he coughs. Pt clarified that he has an ostomy and this is urinary leakage/incontinence.    Occured several times last evening and over NOC. Pt believies this has to do with injection and is seeking advice.    Has had leakage before but not to this extent. Has been advised to follow-up with urology by PCP but cannot be seen until spring. Last miguel with new cough. Afebrile this am and neg covid test at home. Pt states he bought OTC med for cough and this is better    Denies CMS changes in bilat LE, acute injury, fever/infection, saddle anaesthesia, or HA.       Pt advised that forceful cough, mari in setting of pre-existing bladder issues and cough. Pt advised that provider will review but he should continue to treat cough and follow-up with PCP/urology as needed and previously advised.    Lyubov BECKER RN Care Coordinator  [facility]

## 2021-12-21 NOTE — TELEPHONE ENCOUNTER
Reason for call:  Other   Patient called regarding (reason for call): call back  Additional comments: Pt calling to state he has had some loss of bowel after Bilateral L4 transforaminal epidural steroid injection on 12/20.,    Phone number to reach patient:  Home number on file [phone] (home)    Best Time:  anytime    Can we leave a detailed message on this number?  YES    Travel screening: Not Applicable     Vickie SOLANO    [facility]

## 2022-04-14 ENCOUNTER — TRANSFERRED RECORDS (OUTPATIENT)
Dept: HEALTH INFORMATION MANAGEMENT | Facility: CLINIC | Age: 77
End: 2022-04-14
Payer: COMMERCIAL

## 2022-05-16 ENCOUNTER — TELEPHONE (OUTPATIENT)
Dept: PEDIATRICS | Facility: CLINIC | Age: 77
End: 2022-05-16
Payer: COMMERCIAL

## 2022-05-16 DIAGNOSIS — M54.2 CERVICAL PAIN: Primary | ICD-10-CM

## 2022-05-16 NOTE — TELEPHONE ENCOUNTER
General Call:     Who is calling:  Patient    Reason for Call:  Needs order put in for a new consultation at the pain clinic to get additional medication.    What are your questions or concerns:  Needs new order placed with pain clinic.    Date of last appointment with provider: 11/11/21    Okay to leave a detailed message: Home number on file [phone] (home)

## 2022-05-16 NOTE — TELEPHONE ENCOUNTER
Dr. Hale,    Please see telephone encounter   Pain consult referral pended- please     Thank you  Darcy Mar RN on 5/16/2022 at 3:09 PM

## 2022-06-07 NOTE — PROGRESS NOTES
[facility]     Date of visit: 6/9/2022    Assessment:  Paco Eckert is a 77 year old male with a past medical history significant for HTN, hyperlipidemia, prostate cancer, and osteoporosis who presents with complaints of low back pain.     1. Low back pain- etiology likely related to multilevel degenerative disc changes and facet disease, most significant at L4-5 where there is moderate to severe central canal stenosis and moderate to severe right foraminal and moderated left foraminal stenosis. New lumbar MRI ordered today due to development of new symptoms.       1. Lumbar radiculopathy    2. Lumbar spondylosis        Plan:  The following recommendations were given to the patient. Diagnosis, treatment options, risks, benefits, and alternatives were discussed, and all questions were answered. The patient expressed understanding of the plan for management.     I am recommending a multidisciplinary treatment plan to help this patient better manage his pain.  This includes:      1.  Pain Physical Therapy:     Continue regular physical activity as tolerated. Can consider formal physical therapy but lets wait for MRI results.    2.  Pain Psychologist to address relaxation, behavioral change, coping style, and other factors important to improvement.  NO   3.  Diagnostic Studies: Paco had previously had occasional right radicular leg pain but over the last 5-6 weeks he has developed more consistent right and new left radicular leg pain. Due to new symptoms, lumbar MRI ordered today for further evaluation. I will call with the results.    4.  Medication Management:   1. Discussed the addition of gabapentin, reviewed side effects. He is interested. He will start gabapentin as directed below.  Gabapentin 1 tab= 100mg    AM   PM   Bedtime  0   0   100mg (1 tab).  After 3-5 days, increase as tolerated   100mg (1 tab)  0   100mg (1 tab).  After 3-5 days, increase as tolerated   100mg (1  tab)  100mg (1 tab)  100mg (1 tab).  After 3-5 days, increase as tolerated   100mg (1 tab)  100mg (1 tab)  200mg (2 tabs). After 3-5 days, increase as tolerated   200mg (2 tabs)  100mg (1 tab)  200mg (2 tabs). After 3-5 days, increase as tolerated   200mg (2 tabs)  200mg (2 tabs)  200mg (2 tabs). After 3-5 days, increase as tolerated   200mg (2 tabs)  200mg (2 tabs)  300mg (3 tabs). After 3-5 days, increase as tolerated   300mg (3 tabs)  200mg (2 tabs)  300mg (3 tabs). After 3-5 days, increase as tolerated   300mg (3 tabs)  300mg (3 tabs)  300mg (3 tabs).     Call with any problems.  Caution for sedation.    Do not drive until you know how the medication affects you.   You can go slower if you need to or increasing only one dose at a time.  Do not stop abruptly once at higher doses.  This medication must be tapered off.  2. Okay to continue ibuprofen and Tylenol as needed.     5.  Potential procedures: TBD, waiting on MRI results.     6.  Referrals: I advised Paco rosario off on chiropractic care until receive MRI results.    7.  Follow up with ANDREI Thomas CNP in 4-6 weeks.     Review of Electronic Chart: Today I have also reviewed available medical information in the patient's medical record at [facility] ([facility]), including relevant provider notes, laboratory work, and imaging.       ANDREI Thomas CNP  [facility]       -------------------------------------------------    Subjective:    Reason for consultation:    Paco Eckert is a 77 year old male who is seen in consultation today at the request of his PCP,  Emeka Hale for evaluation of his pain issues and recommendations for management, with specific emphasis on  My Clinical Question Is: ongoing neck pain   Reason for Referral: Evaluation for Comprehensive Services   Please review opioid agreement in process instructions, do you agree to these terms? Yes   Are there any red flags that may impact the assessment or  management of the patient? N/A     Please see the [facility] health questionnaire which the patient completed and reviewed with me in detail.    Review of [place] Prescription Monitoring Program (): No concern for abuse or misuse of controlled medications based on this report.     Review of Electronic Chart: Today I have also reviewed available medical information in the patient's medical record at [facility] ([facility]), including relevant provider notes, laboratory work, and imaging.     Pain medications are being prescribed by NA.     Chief Complaint:    Chief Complaint   Patient presents with     Pain       Pain history:  Paco Eckert is a 77 year old male who presents for initial evaluation of chief complaint of low back pain.      He first started having problems with low back pain in 2016 or so. He was initially referred to [facility] and had a visit with Dr. Reese, see initial HPI below. He has had a total of four injections over the years, most recently a bilateral L4-5 transforaminal epidural steroid injection with Dr. Cage on 12/21/2021. He had about 2 months of good pain benefit and the pain gradually returned. He continues to have bilateral low back pain with occasional radicular right leg pain, over the last five weeks he has developed left leg radicular pain which is new for him. He comments his pain has been worse over the last 5-6 weeks, attributes this to be due to stress and increased physical activity taking care of his wife after surgery and puppy. He had a visit with chiropractor recently with some benefit and plans to continue. He can walk for about 5 minutes before he has to rest due to pain. The pain is located in bilateral low back/buttock. Radiates into bilateral lateral thighs and calf and foot, right> left. Denies numbness or tingling. Generalized weakness, ongoing.       HPI per Dr. Reese:  Paco Eckert is a 75 year old male with history of hx of rectal  cancer with colostomy in place and prostate cancer, HLD, HTN, osteoporosis, gout who presents for injection recommendations for chronic back pain. He was initially seen for this by NS in 2016. He reports that the pain now is the same pain he had back then. He states that the pain is located in the bilateral low back. One side is not worse than the other. It is intermittent. He has no pain when sitting. The pain is most bothersome when he is standing or walking. He can walk for about 5 minutes before he has to rest due to pain. He denies having any radicular symptoms. However he does report having some right groin pain. The pain is dull in quality. Pain ranges from 0/10 to 9/10 in severity.     Pain description:  Location: low back  Quality: aching  Severity/Intensity: 5/10 at best, 10/10 at worst, 5/10 on average  Aggravating factors include: walking  Relieving factors include: walking    The patient otherwise denies bowel or bladder incontinence (ongoing issue- referred to Urology), parasthesias, weakness, saddle anesthesia, unintentional weight loss, or fever/chills/sweats.     Paco MURALI Eckert has been seen at a pain clinic in the past.  For injections only- one visit with [facility].     Pain Treatments:  (H--helped; HI--Helped initially; [facility]--Somewhat helpful; NH--No help; W--worse; SE--side effects; ?--Unsure if helpful)   1. Medications:       Current pain medications:   Advil 400mg Q4h prn- [facility], usually takes in conjunction with Tylenol but not lately   Tylenol 500mg Q4h- [facility]    Current calculated MME: 0    1. Previous Pain Relevant Medications:   Opiates: post op only    NSAIDS: Advil- [facility]    Muscle Relaxants: yes but cannot recall   Anti-migraine mediations:    Anti-depressants:    Sleep aids:   Anxiolytics:    Neuropathics: no   Topicals: no   Other medications not covered above: Tylenol- [facility]    2. Physical Therapy: yes but only for shoulder NH, exercises at the gym on a regular basis  3. Pain  Psychology: no  4. Surgery: no  5. Injections:   Bilateral L4-5 transforaminal epidural steroid injection on 12/20/2021 with Dr. Cage- 2 months   Bilateral L4-5 transforaminal epidural steroid injection on 3/22/2021 with Dr. Reese- 2 months   Right L4-5 Transforaminal epidural steroid injection x 2 on 1/4/2017 and 12/27/2017 by Dr. Abelino SORIANO for 4-6 months  6. Chiropractic: yes- [facility], recently restarted, tried for neck with some benefit in the past  7. Acupuncture: no  8. TENS Unit: no    Past Medical History:  Past Medical History:   Diagnosis Date     Fatty liver 2/9/2011     Gouty arthropathy      Hypercholesteremia 12/15/2009     Hypertension      Impaired fasting glucose 7/26/2010     Leukopenia 11/7/2008    WBC 3.6 in 11/08.     Malignant neoplasm of prostate (H) 2000    on casodex and Lupron     Osteoporosis, unspecified     assoc with Lupron     Other psoriasis        Past Surgical History:  Past Surgical History:   Procedure Laterality Date     APPLY WOUND VAC N/A 10/21/2014    Procedure: APPLY WOUND VAC;  Surgeon: Mir Hernandez MD;  Location:  OR     COLONOSCOPY N/A 10/10/2014    Procedure: COMBINED COLONOSCOPY, SINGLE BIOPSY/POLYPECTOMY BY BIOPSY;  Surgeon: Mir Hernandez MD;  Location:  GI     COLOSTOMY N/A 10/21/2014    Procedure: COLOSTOMY;  Surgeon: Mir Hernandez MD;  Location:  OR     ORTHOPEDIC SURGERY Bilateral 2000 ?    rotater repair both     ORTHOPEDIC SURGERY Right     ankle      RESECTION ABDOMINAL PERINEAL N/A 10/21/2014    Procedure: RESECTION ABDOMINAL PERINEAL;  Surgeon: Mir Hernandez MD;  Location:  OR       Medications:  Current Outpatient Medications   Medication Sig Dispense Refill     gabapentin (NEURONTIN) 100 MG capsule Take 3 capsules (300 mg) by mouth 3 times daily 270 capsule 1     lisinopril (ZESTRIL) 10 MG tablet Take 1 tablet (10 mg) by mouth daily 90 tablet 3     simvastatin (ZOCOR) 40 MG tablet Take 1 tablet (40 mg) by mouth At Bedtime 90 tablet 3      tamsulosin (FLOMAX) 0.4 MG capsule TAKE 1 CAPSULE BY MOUTH EVERY DAY 90 capsule 3       Allergies:     Allergies   Allergen Reactions     Seasonal Allergies        Social History:  Home situation: lives in house with wife, son, and dog Roxy  Support system: family  Occupation/Schooling: retired salesman  Tobacco use: no  Drug use: marijuana years ago  Alcohol use: few days a week  History of chemical dependency treatment: no  Mental health admissions: no    Family history:  Family History   Problem Relation Age of Onset     Diabetes Brother      Sleep Apnea Brother      Diabetes Sister      Sleep Apnea Sister      Hypertension Sister      Hypertension Brother      Snoring Father        Review of Systems:    POSTIVE IN BOLD  GENERAL: fever/chills, fatigue, general unwell feeling, weight gain/loss.  HEAD/EYES:  headache, dizziness, or vision changes.    EARS/NOSE/THROAT: nosebleeds, hearing loss, sinus infection, earache, tinnitus.  IMMUNE:  allergies, cancer, immune deficiency, or infections.  SKIN:  itching, rash, hives  HEME/Lymphatic: anemia, easy bruising, easy bleeding.  RESPIRATORY: cough, wheezing, or shortness of breath (since childhood)  CARDIOVASCULAR/Circulation: extremity edema, syncope, hypertension, tachycardia, or angina.  GASTROINTESTINAL: abdominal pain, nausea/emesis, diarrhea, constipation,  hematochezia, or melena.  ENDOCRINE:  diabetes, steroid use,  thyroid disease or osteoporosis.  MUSCULOSKELETAL: joint pain, stiffness, neck pain, back pain, arthritis, or gout.  GENITOURINARY: frequency, urgency, dysuria, difficulty voiding, hematuria or incontinence.  NEUROLOGIC: weakness, numbness, paresthesias, seizure, tremor, stroke or memory loss.  PSYCHIATRIC: depression, anxiety, stress, suicidal thoughts or mood swings.     Objective:    Imaging:  MRI of lumbar spine was completed on 12/23/2020 and shows:      Physical Exam:  Vitals:    06/09/22 0855   BP: 121/68   Pulse: 72   SpO2: 98%      Exam:  Constitutional: Well developed, well nourished, appears stated age.  HEENT: Head atraumatic, normocephalic. Eyes without conjunctival injection or jaundice. Oropharynx clear. Neck supple. No obvious neck masses.  Skin: No rash, lesions, or petechiae of exposed skin.   Extremities: Peripheral pulses intact. No clubbing, cyanosis, or edema.  Psychiatric/mental status: Alert, without lethargy or stupor. Speech fluent. Appropriate affect. Mood normal. Able to follow commands without difficulty.     Musculoskeletal exam:  Able to walk on the heels and toes with some difficulty. Patient has antalgic gait favoring neither side.   Normal bulk and tone. Unremarkable spinal curvature.     Cervical spine:  Range of motion reduced.   Tenderness in the cervical paraspinal muscles.No    Thoracic spine:    Kyphosis. No   Tenderness in the thoracic paraspinal muscles.No    Lumbar spine:    Flex:  80 degrees   Ext: 30 degrees   Tenderness in the lumbar paraspinal muscles.No   Rotation/ext to right: pain free   Rotation/ext to left: pain free    Focal tenderness: No SI joint, gluteal, piriformis tenderness. Bilateral GT tenderness  Straight leg raise: positive bilateral  FADIR: negative     Hip exam:   Normal internal and external range of motion bilaterally. MUNIRA negative.     Neurologic exam:  CN:  Cranial nerves 2-12 are grossly intact5  Motor:  5/5 UE and LE strength            Shoulder abduction (deltoid C5,6)    R: 5/5 L: 5/5  Elbow flexion (bicepts C5,6)      R: 5/5 L: 5/5  Elbow extension (tricepts C7)     R: 5/5 L: 5/5  Finger abduction (C8)      R: 5/5 L: 5/5  Thumb flexion (C8)       R: 5/5 L: 5/5        Hip flexion (Iliosoas L1,2,3)     R: 5/5 L: 5/5  Knee extension (quadricepts L2,3,4)    R: 5/5 L: 5/5  Ankle dorsiflexion (tibialis anterior L4,5)   R: 5/5 L: 5/5    Ankle plantarflexion (gastrocnemius, soleus S1-2)  R: 5/5 L: 5/5  Knee flexion (hamstrings L5, S1-2)    R: 5/5 L: 5/5    Reflexes:     Biceps:      R:  3/4 L: 3/4   Brachioradialis   R:  3/4 L: 3/4   Patella:  R:  3/4 L: 3/4   Achilles:  R:  3/4 L: 3/4    Sensory:   Light touch: normal bilateral upper and lower extremities    No allodynia, dysesthesia, or hyperalgesia.    BILLING TIME DOCUMENTATION:   The total TIME spent on this patient on the date of the encounter/appointment was 50 minutes.      TOTAL TIME includes:   Time spent preparing to see the patient (reviewing records and tests)   Time spent face to face (or over the phone) with the patient   Time spent ordering tests, medications, procedures and referrals   Time spent Referring and communicating with other healthcare professionals   Time spent documenting clinical information in Epic

## 2022-06-09 ENCOUNTER — OFFICE VISIT (OUTPATIENT)
Dept: PALLIATIVE MEDICINE | Facility: CLINIC | Age: 77
End: 2022-06-09
Attending: INTERNAL MEDICINE
Payer: COMMERCIAL

## 2022-06-09 VITALS — OXYGEN SATURATION: 98 % | SYSTOLIC BLOOD PRESSURE: 121 MMHG | HEART RATE: 72 BPM | DIASTOLIC BLOOD PRESSURE: 68 MMHG

## 2022-06-09 DIAGNOSIS — M54.16 LUMBAR RADICULOPATHY: Primary | ICD-10-CM

## 2022-06-09 DIAGNOSIS — M47.816 LUMBAR SPONDYLOSIS: ICD-10-CM

## 2022-06-09 PROCEDURE — 99215 OFFICE O/P EST HI 40 MIN: CPT | Performed by: NURSE PRACTITIONER

## 2022-06-09 RX ORDER — GABAPENTIN 100 MG/1
300 CAPSULE ORAL 3 TIMES DAILY
Qty: 270 CAPSULE | Refills: 1 | Status: SHIPPED | OUTPATIENT
Start: 2022-06-09 | End: 2022-11-11

## 2022-06-09 ASSESSMENT — PAIN SCALES - GENERAL: PAINLEVEL: MODERATE PAIN (5)

## 2022-06-09 NOTE — PATIENT INSTRUCTIONS
1.  Pain Physical Therapy:     Continue regular physical activity as tolerated. Can consider formal physical therapy but lets wait for MRI results.    2.  Pain Psychologist to address relaxation, behavioral change, coping style, and other factors important to improvement.  NO   3.  Diagnostic Studies:  lumbar MRI ordered today. I will call with the results.    4.  Medication Management:   Start gabapentin as directed below.  Start gabapentin as follows (slow titration)   Gabapentin 1 tab= 100mg    AM   PM   Bedtime  0   0   100mg (1 tab).  After 3-5 days, increase as tolerated   100mg (1 tab)  0   100mg (1 tab).  After 3-5 days, increase as tolerated   100mg (1 tab)  100mg (1 tab)  100mg (1 tab).  After 3-5 days, increase as tolerated   100mg (1 tab)  100mg (1 tab)  200mg (2 tabs). After 3-5 days, increase as tolerated   200mg (2 tabs)  100mg (1 tab)  200mg (2 tabs). After 3-5 days, increase as tolerated   200mg (2 tabs)  200mg (2 tabs)  200mg (2 tabs). After 3-5 days, increase as tolerated   200mg (2 tabs)  200mg (2 tabs)  300mg (3 tabs). After 3-5 days, increase as tolerated   300mg (3 tabs)  200mg (2 tabs)  300mg (3 tabs). After 3-5 days, increase as tolerated   300mg (3 tabs)  300mg (3 tabs)  300mg (3 tabs).     Call with any problems.  Caution for sedation.    Do not drive until you know how the medication affects you.   You can go slower if you need to or increasing only one dose at a time.  Do not stop abruptly once at higher doses.  This medication must be tapered off.  2. Okay to continue ibuprofen and Tylenol as needed.     5.  Potential procedures: TBD.     6.  Referrals: be cautious with chiropractic care until receive MRI results.    7.  Follow up with ANDREI Thomas CNP in 4-6 weeks.       ----------------------------------------------------------------  Clinic Number:  [phone]   Call with any questions about your care and for scheduling assistance.   Calls are returned Monday through Friday  between 8 AM and 4:30 PM. We usually get back to you within 2 business days depending on the issue/request.    If we are prescribing your medications:  For opioid medication refills, call the clinic or send a Glydet message 7 days in advance.  Please include:  Name of requested medication  Name of the pharmacy.  For non-opioid medications, call your pharmacy directly to request a refill. Please allow 3-4 days to be processed.   Per [place] State Law:  All controlled substance prescriptions must be filled within 30 days of being written.    For those controlled substances allowing refills, pickup must occur within 30 days of last fill.      We believe regular attendance is key to your success in our program!    Any time you are unable to keep your appointment we ask that you call us at least 24 hours in advance to cancel.This will allow us to offer the appointment time to another patient.   Multiple missed appointments may lead to dismissal from the clinic.

## 2022-06-09 NOTE — NURSING NOTE
PEG Score 6/9/2022   PEG Total Score 5         Ludmila Kaplan MA  [facility] [place]

## 2022-06-10 ENCOUNTER — TELEPHONE (OUTPATIENT)
Dept: PALLIATIVE MEDICINE | Facility: CLINIC | Age: 77
End: 2022-06-10
Payer: COMMERCIAL

## 2022-06-10 DIAGNOSIS — M54.16 LUMBAR RADICULOPATHY: Primary | ICD-10-CM

## 2022-06-10 NOTE — TELEPHONE ENCOUNTER
External MRI ordered. [facility].    ANDREI Thomas [facility]

## 2022-06-10 NOTE — TELEPHONE ENCOUNTER
Patient called and stated he would [facility] [place] because they have a open MRI       Hollie Whitmore    [place] [facility]

## 2022-06-10 NOTE — TELEPHONE ENCOUNTER
Routing to provider for external MRI orders.     Per 06/09/22     3.  Diagnostic Studies:  lumbar MRI ordered today. I will call with the results.       Evelina CONTRERAS, RN Care Coordinator  [facility]  Pain [facility]

## 2022-06-14 NOTE — TELEPHONE ENCOUNTER
Called [facility] at [phone].  The orders were not received.  Was told to fax them to:  [phone]    The MRI orders were successfully sent to the above fax #.    Called pt and relayed the above.      [place] RN-BSN  [facility]

## 2022-06-14 NOTE — TELEPHONE ENCOUNTER
Pt calling to confirm [facility] [place] for [facility] fax # [phone].      Gigi Bustamante    [facility] [place]

## 2022-07-13 ENCOUNTER — TRANSFERRED RECORDS (OUTPATIENT)
Dept: PALLIATIVE MEDICINE | Facility: CLINIC | Age: 77
End: 2022-07-13

## 2022-07-25 ENCOUNTER — TELEPHONE (OUTPATIENT)
Dept: PALLIATIVE MEDICINE | Facility: CLINIC | Age: 77
End: 2022-07-25

## 2022-07-25 NOTE — TELEPHONE ENCOUNTER
Called to review lumbar MRI results, left voicemail. Most significant findings at L4-5, may be a new disc bulge and has moderate central canal as well as mild to moderate bilateral foraminal narrowing. I recommend an epidural steroid injection for radicular leg pain.    ANDREI Thomas [facility]         Lumbar MRI was completed on 7/13/2021 and shows:

## 2022-07-26 ENCOUNTER — TELEPHONE (OUTPATIENT)
Dept: PEDIATRICS | Facility: CLINIC | Age: 77
End: 2022-07-26

## 2022-07-26 DIAGNOSIS — L40.8 OTHER PSORIASIS: Primary | ICD-10-CM

## 2022-07-26 RX ORDER — CLOBETASOL PROPIONATE 0.5 MG/ML
SOLUTION TOPICAL 2 TIMES DAILY
Qty: 25 ML | Refills: 0 | Status: SHIPPED | OUTPATIENT
Start: 2022-07-26 | End: 2022-11-11

## 2022-07-26 NOTE — TELEPHONE ENCOUNTER
Patient calling requesting medication clobetasol propionate 0.05%, uses on scalp. Patient reported medication per chart review, has not been prescribed by PCP in the past. Patient is not able to see dermatology for 2 months. RN offered e-visit. Patient declines as he does not use MyChart. Please advise if in person or virtual visit needed.     Jack MADRID RN

## 2022-08-17 ENCOUNTER — TELEPHONE (OUTPATIENT)
Dept: PALLIATIVE MEDICINE | Facility: CLINIC | Age: 77
End: 2022-08-17

## 2022-08-17 DIAGNOSIS — M54.16 LUMBAR RADICULOPATHY: Primary | ICD-10-CM

## 2022-08-17 NOTE — TELEPHONE ENCOUNTER
[facility]    Phone Message    May a detailed message be left on voicemail: yes     Reason for Call: Requesting Results   Name/type of test: MRI results  Date of test: Recent imaging  Was test done at a location other than [facility] (Please fill in the location if not [facility])?: No      Action Taken: Other: Pain    Travel Screening: Not Applicable

## 2022-08-18 NOTE — TELEPHONE ENCOUNTER
[facility]    Phone Message    May a detailed message be left on voicemail: yes     Reason for Call: Other: Pt called back and stated that he is having trouble receiving inbound calls so he would like a nurse to reach out at his wife's number [phone].    Action Taken: Message routed to:  [facility]    Travel Screening: Not Applicable

## 2022-08-18 NOTE — TELEPHONE ENCOUNTER
[facility]    Phone Message    May a detailed message be left on voicemail: yes     Reason for Call: Other: Pt called back and stated that he never received a call or a voicemail so something must be wrong with his phone but he would like a call back to discuss his MRI. Please call Pt when available.      Action Taken: Message routed to:  [facility]    Travel Screening: Not Applicable

## 2022-08-18 NOTE — TELEPHONE ENCOUNTER
Called pt. Sanket to call back to discuss    Evelina CONTRERAS, RN Care Coordinator  [facility]  Pain [facility]

## 2022-08-19 NOTE — TELEPHONE ENCOUNTER
Called pt and relayed information below RE:MRI results. Pt would like to proceed with injection.     Routing to provider for injection orders    Evelina CONTRERAS, RN Care Coordinator  [facility]  Pain [facility]

## 2022-08-22 NOTE — TELEPHONE ENCOUNTER
Please contact pt for scheduling    Interventional Evaluation:     Interventional Injection Only - Type of Injection: L4-5 interlaminar epidural steroid injection Radiology? Yes    Evelina CONTRERAS, RN Care Coordinator  [facility]  Pain [facility]

## 2022-09-16 ENCOUNTER — RADIOLOGY INJECTION OFFICE VISIT (OUTPATIENT)
Dept: PALLIATIVE MEDICINE | Facility: CLINIC | Age: 77
End: 2022-09-16
Attending: NURSE PRACTITIONER
Payer: COMMERCIAL

## 2022-09-16 VITALS — HEART RATE: 75 BPM | SYSTOLIC BLOOD PRESSURE: 136 MMHG | DIASTOLIC BLOOD PRESSURE: 80 MMHG | OXYGEN SATURATION: 96 %

## 2022-09-16 DIAGNOSIS — M54.16 LUMBAR RADICULOPATHY: ICD-10-CM

## 2022-09-16 PROCEDURE — 62323 NJX INTERLAMINAR LMBR/SAC: CPT | Performed by: PHYSICAL MEDICINE & REHABILITATION

## 2022-09-16 RX ORDER — TRIAMCINOLONE ACETONIDE 40 MG/ML
40 INJECTION, SUSPENSION INTRA-ARTICULAR; INTRAMUSCULAR ONCE
Status: COMPLETED | OUTPATIENT
Start: 2022-09-16 | End: 2022-09-16

## 2022-09-16 RX ADMIN — TRIAMCINOLONE ACETONIDE 40 MG: 40 INJECTION, SUSPENSION INTRA-ARTICULAR; INTRAMUSCULAR at 11:55

## 2022-09-16 ASSESSMENT — PAIN SCALES - GENERAL: PAINLEVEL: NO PAIN (0)

## 2022-09-16 NOTE — NURSING NOTE
Discharge Information    IV Discontiued Time:  NA    Amount of Fluid Infused:  NA    Discharge Criteria = When patient returns to baseline or as per MD order    Consciousness:  Pt is fully awake    Circulation:  BP +/- 20% of pre-procedure level    Respiration:  Patient is able to breathe deeply    O2 Sat:  Patient is able to maintain O2 Sat >92% on room air    Activity:  Moves 4 extremities on command    Ambulation:  Patient is able to stand and walk or stand and pivot into wheelchair    Dressing:  Clean/dry or No Dressing    Notes:   Discharge instructions and AVS given to patient    Patient meets criteria for discharge?  YES    Admitted to PCU?  No    Responsible adult present to accompany patient home?  Yes    Signature/Title:    Evelina Han RN  RN Care Coordinator  [facility] [place]

## 2022-09-16 NOTE — NURSING NOTE
Pre-procedure Intake  If YES to any questions or NO to having a   Please complete laminated checklist and leave on the computer keyboard for Provider, verbally inform provider if able.    For SCS Trial, RFA's or any sedation procedure:  Have you been fasting? NA    If yes, for how long?     Are you taking any any blood thinners such as Coumadin, Warfarin, Jantoven, Pradaxa Xarelto, Eliquis, Edoxaban, Enoxaparin, Lovenox, Heparin, Arixtra, Fondaparinux, or Fragmin? OR Antiplatelet medication such as Plavix, Brilinta, or Effient?   No     If yes, when did you take your last dose?     Do you take aspirin?  No    If cervical procedure, have you held aspirin for 6 days?   NA    Do you have any allergies to contrast dye, iodine, steroid and/or numbing medications?  NO    Are you currently taking antibiotics or have an active infection?  NO    Have you had a fever/elevated temperature within the past week? NO    Are you currently taking oral steroids? NO    Do you have a ? Yes    Are you pregnant or breastfeeding?  Not Applicable    Have you received the COVID-19 vaccine? Yes    If yes, was it your 1st, 2nd or only dose needed? 2nd dose and booster    Date of most recent vaccine: 11/11/2021    Notify provider and RNs if systolic BP >170, diastolic BP >100, P >100 or O2 sats < 90%    Shruthi Cristobal MA  [facility] [place]

## 2022-09-16 NOTE — PATIENT INSTRUCTIONS
[facility] Procedure Discharge Instructions    Today you saw:   Dr. Sully Reese       Your procedure:  Epidural steroid injection      Medications used:  Lidocaine (anesthetic) Kenalog (steroid)  Omnipaque (contrast)           Be cautious when walking as numbness and/or weakness in the legs may occur up to 6-8 hours after the procedure due to effect of the local anesthetic  Do not drive for 6 hours. The effect of the local anesthetic could slow your reflexes.   Avoid strenuous activity for the first 24 hours. You may resume your regular activities after that.   You may shower, however avoid swimming, tub baths or hot tubs for 24 hours following your procedure  You may have a mild to moderate increase in pain for several days following the injection.    You may use ice packs for 10-15 minutes, 3 to 4 times a day at the injection site for comfort  Do not use heat to painful areas for 6 to 8 hours. This will give the local anesthetic time to wear off and prevent you from accidentally burning your skin.  Unless you have been directed to avoid the use of anti-inflammatory medications (NSAIDS-ibuprofen, Aleve, Motrin), you may use these medications or Tylenol for pain control if needed.   With diabetes, check your blood sugar more frequently than usual as your blood sugar may be higher than normal for 10-14 days following a steroid injection. Contact your doctor who manages your diabetes if your blood sugar is higher than usual  Possible side effects of steroids that you may experience include flushing, elevated blood pressure, increased appetite, mild headaches and restlessness.  All of these symptoms will get better with time.  It may take up to 14 days for the steroid medication to start working although you may feel the effect as early as a few days after the procedure.   Follow up with your referring provider in 2-3 weeks    If you experience any of the following, call the pain center line during  work hours at [phone] or on-call physician after hours at [phone]:  -Fever over 100 degree F  -Swelling, bleeding, redness, drainage, warmth at the injection site  -Progressive weakness or numbness in your legs   -Loss of bowel or bladder function  -Unusual headache that is not relieved by Tylenol or your regular headache medication  -Unusual new onset of pain that is not improving

## 2022-09-16 NOTE — PROCEDURES
[facility] - Procedure Note    Date of Visit: 9/16/2022    Procedure performed: Lumbar L4-5 interlaminar epidural steroid injection  Diagnosis: Lumbar spondylosis; Lumbar radiculitis/radiculopathy  : Sully Reese MD; Ania Mosley MD  Anesthesia: None    Indications: Paco Eckert is a 77 year old male who is seen at the request of ANDREI Heredia CNP for a lumbar epidural steroid injection. The patient describes chronic bilateral low back, hip and thigh pain. The patient has been exhibiting symptoms consistent with lumbar intraspinal inflammation and radiculopathy. Symptoms have been persistent, disabling, and intermittently severe. The patient reports minimal improvement with conservative treatment, including medications and exercises.     Lumbar MRI was done on 7/13/2022 which showed         Allergies:      Allergies   Allergen Reactions     Seasonal Allergies         Vitals:  /72   Pulse 77   SpO2 98%     Review of Systems: The patient denies recent fever, chills, illness, use of antibiotics or anticoagulants. All other 10-point review of systems negative.     Procedure: The procedure and risks were explained, and informed written consent was obtained from the patient. Risks include but are not limited to: infection, bleeding, increased pain, and damage to soft tissue, nerve, muscle, and vasculature structures. After getting informed consent, patient was brought into the procedure suite and was placed in a prone position on the procedure table. A Pause for the Cause was performed. Patient was prepped and draped in sterile fashion.     The L4-5  interspace was identified with use of fluoroscopy in AP view. A 25-gauge, 1.5 inch needle was used to anesthetize the skin and subcutaneous tissue entry site with a total of 2 ml of 1% lidocaine. Under fluoroscopic visualization, a 22-gauge, 3.5 inch Tuohy epidural needle was slowly advanced towards the epidural  space a few millimeters right of midline. The latter part of the needle advancement was guided with fluoroscopy in the lateral view. The epidural space was identified using loss of resistance technique. After negative aspiration for heme and cerebrospinal fluid, a total of 1 mL of non-ionic contrast was injected to confirm needle placement with 9 mL of contrast wasted. Epidurogram confirmed spread within the posterior epidural space. 1 ml of 40mg/ml of triamcinolone, 1 ml of 1% lidocaine, and 3 ml of preservative free saline was injected. The needle was removed.  Images were saved to PACS.    The patient tolerated the procedure well, and there was no evidence of procedural complications. No new sensory or motor deficits were noted following the procedure. The patient was stable and able to ambulate on discharge home. Post-procedure instructions were provided.     Pre-procedure pain score: 0/10 in the back, 0/10 in the leg  Post-procedure pain score: 0/10 in the back, 0/10 in the leg    Assessment/Plan: Paco Eckert is a 77 year old male s/p lumbar interlaminar epidural steroid injection today for lumbar spondylosis and radiculitis/radiculopathy.     1. Following today's procedure, the patient was advised to contact the [facility] for any of the following:   Fever, chills, or night sweats   New onset of pain, numbness, or weakness   Any questions/concerns regarding the procedure  If unable to contact the Pain Center, the patient was instructed to go to a local Emergency Room for any complications.   2. Follow-up with the referring provider    MD GALINA [facility]

## 2022-10-10 ENCOUNTER — HEALTH MAINTENANCE LETTER (OUTPATIENT)
Age: 77
End: 2022-10-10

## 2022-10-28 ENCOUNTER — TELEPHONE (OUTPATIENT)
Dept: PALLIATIVE MEDICINE | Facility: CLINIC | Age: 77
End: 2022-10-28

## 2022-10-28 NOTE — TELEPHONE ENCOUNTER
Last OV: 6/9/22    Next OV: Follow-up in 4-6 weeks    [place] plan: YULISSA BECKER RN Care Coordinator  [facility]

## 2022-11-11 ENCOUNTER — OFFICE VISIT (OUTPATIENT)
Dept: PEDIATRICS | Facility: CLINIC | Age: 77
End: 2022-11-11
Payer: COMMERCIAL

## 2022-11-11 VITALS
RESPIRATION RATE: 16 BRPM | SYSTOLIC BLOOD PRESSURE: 120 MMHG | TEMPERATURE: 97.3 F | HEART RATE: 87 BPM | HEIGHT: 65 IN | OXYGEN SATURATION: 100 % | BODY MASS INDEX: 33.32 KG/M2 | DIASTOLIC BLOOD PRESSURE: 68 MMHG | WEIGHT: 200 LBS

## 2022-11-11 DIAGNOSIS — R35.1 NOCTURIA: ICD-10-CM

## 2022-11-11 DIAGNOSIS — E78.5 HYPERLIPIDEMIA LDL GOAL <130: ICD-10-CM

## 2022-11-11 DIAGNOSIS — R32 URINARY INCONTINENCE, UNSPECIFIED TYPE: ICD-10-CM

## 2022-11-11 DIAGNOSIS — R73.01 IMPAIRED FASTING GLUCOSE: ICD-10-CM

## 2022-11-11 DIAGNOSIS — R53.83 OTHER FATIGUE: Primary | ICD-10-CM

## 2022-11-11 DIAGNOSIS — C20 RECTAL CANCER (H): ICD-10-CM

## 2022-11-11 DIAGNOSIS — I10 BENIGN ESSENTIAL HYPERTENSION: ICD-10-CM

## 2022-11-11 DIAGNOSIS — E55.9 VITAMIN D DEFICIENCY, UNSPECIFIED: ICD-10-CM

## 2022-11-11 DIAGNOSIS — C61 MALIGNANT NEOPLASM OF PROSTATE (H): ICD-10-CM

## 2022-11-11 DIAGNOSIS — L40.8 OTHER PSORIASIS: ICD-10-CM

## 2022-11-11 LAB
ALBUMIN SERPL-MCNC: 3.9 G/DL (ref 3.4–5)
ALP SERPL-CCNC: 71 U/L (ref 40–150)
ALT SERPL W P-5'-P-CCNC: 20 U/L (ref 0–70)
ANION GAP SERPL CALCULATED.3IONS-SCNC: 6 MMOL/L (ref 3–14)
AST SERPL W P-5'-P-CCNC: 16 U/L (ref 0–45)
BASOPHILS # BLD AUTO: 0 10E3/UL (ref 0–0.2)
BASOPHILS NFR BLD AUTO: 1 %
BILIRUB SERPL-MCNC: 0.7 MG/DL (ref 0.2–1.3)
BUN SERPL-MCNC: 23 MG/DL (ref 7–30)
CALCIUM SERPL-MCNC: 9 MG/DL (ref 8.5–10.1)
CHLORIDE BLD-SCNC: 107 MMOL/L (ref 94–109)
CO2 SERPL-SCNC: 24 MMOL/L (ref 20–32)
CREAT SERPL-MCNC: 0.8 MG/DL (ref 0.66–1.25)
DEPRECATED CALCIDIOL+CALCIFEROL SERPL-MC: 32 UG/L (ref 20–75)
EOSINOPHIL # BLD AUTO: 0.2 10E3/UL (ref 0–0.7)
EOSINOPHIL NFR BLD AUTO: 3 %
ERYTHROCYTE [DISTWIDTH] IN BLOOD BY AUTOMATED COUNT: 12.7 % (ref 10–15)
GFR SERPL CREATININE-BSD FRML MDRD: >90 ML/MIN/1.73M2
GLUCOSE BLD-MCNC: 121 MG/DL (ref 70–99)
HBA1C MFR BLD: 5.3 % (ref 0–5.6)
HCT VFR BLD AUTO: 35.4 % (ref 40–53)
HGB BLD-MCNC: 12.4 G/DL (ref 13.3–17.7)
LYMPHOCYTES # BLD AUTO: 1.1 10E3/UL (ref 0.8–5.3)
LYMPHOCYTES NFR BLD AUTO: 21 %
MCH RBC QN AUTO: 33.1 PG (ref 26.5–33)
MCHC RBC AUTO-ENTMCNC: 35 G/DL (ref 31.5–36.5)
MCV RBC AUTO: 94 FL (ref 78–100)
MONOCYTES # BLD AUTO: 0.5 10E3/UL (ref 0–1.3)
MONOCYTES NFR BLD AUTO: 9 %
NEUTROPHILS # BLD AUTO: 3.6 10E3/UL (ref 1.6–8.3)
NEUTROPHILS NFR BLD AUTO: 67 %
PLATELET # BLD AUTO: 150 10E3/UL (ref 150–450)
POTASSIUM BLD-SCNC: 4.3 MMOL/L (ref 3.4–5.3)
PROT SERPL-MCNC: 6.9 G/DL (ref 6.8–8.8)
PSA SERPL-MCNC: <0.01 UG/L (ref 0–4)
RBC # BLD AUTO: 3.75 10E6/UL (ref 4.4–5.9)
SODIUM SERPL-SCNC: 137 MMOL/L (ref 133–144)
TSH SERPL DL<=0.005 MIU/L-ACNC: 2.58 MU/L (ref 0.4–4)
VIT B12 SERPL-MCNC: 628 PG/ML (ref 232–1245)
WBC # BLD AUTO: 5.5 10E3/UL (ref 4–11)

## 2022-11-11 PROCEDURE — 91312 COVID-19,PF,PFIZER BOOSTER BIVALENT: CPT | Performed by: INTERNAL MEDICINE

## 2022-11-11 PROCEDURE — G0008 ADMIN INFLUENZA VIRUS VAC: HCPCS | Performed by: INTERNAL MEDICINE

## 2022-11-11 PROCEDURE — 84443 ASSAY THYROID STIM HORMONE: CPT | Performed by: INTERNAL MEDICINE

## 2022-11-11 PROCEDURE — 0124A COVID-19,PF,PFIZER BOOSTER BIVALENT: CPT | Performed by: INTERNAL MEDICINE

## 2022-11-11 PROCEDURE — 82306 VITAMIN D 25 HYDROXY: CPT | Performed by: INTERNAL MEDICINE

## 2022-11-11 PROCEDURE — 83036 HEMOGLOBIN GLYCOSYLATED A1C: CPT | Performed by: INTERNAL MEDICINE

## 2022-11-11 PROCEDURE — G0439 PPPS, SUBSEQ VISIT: HCPCS | Performed by: INTERNAL MEDICINE

## 2022-11-11 PROCEDURE — 82607 VITAMIN B-12: CPT | Performed by: INTERNAL MEDICINE

## 2022-11-11 PROCEDURE — 36415 COLL VENOUS BLD VENIPUNCTURE: CPT | Performed by: INTERNAL MEDICINE

## 2022-11-11 PROCEDURE — 80053 COMPREHEN METABOLIC PANEL: CPT | Performed by: INTERNAL MEDICINE

## 2022-11-11 PROCEDURE — 99214 OFFICE O/P EST MOD 30 MIN: CPT | Mod: 25 | Performed by: INTERNAL MEDICINE

## 2022-11-11 PROCEDURE — 90662 IIV NO PRSV INCREASED AG IM: CPT | Performed by: INTERNAL MEDICINE

## 2022-11-11 PROCEDURE — 84153 ASSAY OF PSA TOTAL: CPT | Performed by: INTERNAL MEDICINE

## 2022-11-11 PROCEDURE — 85025 COMPLETE CBC W/AUTO DIFF WBC: CPT | Performed by: INTERNAL MEDICINE

## 2022-11-11 RX ORDER — BENZONATATE 100 MG/1
100 CAPSULE ORAL
COMMUNITY
Start: 2022-07-01 | End: 2022-11-11

## 2022-11-11 RX ORDER — CLOBETASOL PROPIONATE 0.5 MG/G
CREAM TOPICAL 2 TIMES DAILY
Qty: 45 G | Refills: 1 | Status: SHIPPED | OUTPATIENT
Start: 2022-11-11 | End: 2023-12-26

## 2022-11-11 RX ORDER — LISINOPRIL 10 MG/1
10 TABLET ORAL
COMMUNITY
Start: 2022-04-16 | End: 2022-11-11

## 2022-11-11 RX ORDER — PREDNISONE 20 MG/1
TABLET ORAL
COMMUNITY
Start: 2022-07-01 | End: 2022-11-11

## 2022-11-11 RX ORDER — CLOBETASOL PROPIONATE 0.5 MG/ML
SOLUTION TOPICAL 2 TIMES DAILY
Qty: 50 ML | Refills: 2 | Status: SHIPPED | OUTPATIENT
Start: 2022-11-11 | End: 2023-08-03

## 2022-11-11 RX ORDER — LISINOPRIL 10 MG/1
10 TABLET ORAL DAILY
Qty: 90 TABLET | Refills: 3 | Status: SHIPPED | OUTPATIENT
Start: 2022-11-11 | End: 2023-11-13

## 2022-11-11 RX ORDER — TAMSULOSIN HYDROCHLORIDE 0.4 MG/1
0.8 CAPSULE ORAL DAILY
Qty: 180 CAPSULE | Refills: 3 | Status: SHIPPED | OUTPATIENT
Start: 2022-11-11 | End: 2023-12-26

## 2022-11-11 RX ORDER — SIMVASTATIN 40 MG
40 TABLET ORAL AT BEDTIME
Qty: 90 TABLET | Refills: 3 | Status: SHIPPED | OUTPATIENT
Start: 2022-11-11 | End: 2023-11-13

## 2022-11-11 RX ORDER — DOXYCYCLINE 100 MG/1
CAPSULE ORAL
COMMUNITY
Start: 2022-07-01 | End: 2022-11-11

## 2022-11-11 SDOH — HEALTH STABILITY: PHYSICAL HEALTH: ON AVERAGE, HOW MANY DAYS PER WEEK DO YOU ENGAGE IN MODERATE TO STRENUOUS EXERCISE (LIKE A BRISK WALK)?: 4 DAYS

## 2022-11-11 SDOH — HEALTH STABILITY: PHYSICAL HEALTH: ON AVERAGE, HOW MANY MINUTES DO YOU ENGAGE IN EXERCISE AT THIS LEVEL?: 40 MIN

## 2022-11-11 SDOH — ECONOMIC STABILITY: INCOME INSECURITY: IN THE LAST 12 MONTHS, WAS THERE A TIME WHEN YOU WERE NOT ABLE TO PAY THE MORTGAGE OR RENT ON TIME?: NO

## 2022-11-11 SDOH — ECONOMIC STABILITY: FOOD INSECURITY: WITHIN THE PAST 12 MONTHS, YOU WORRIED THAT YOUR FOOD WOULD RUN OUT BEFORE YOU GOT MONEY TO BUY MORE.: NEVER TRUE

## 2022-11-11 SDOH — ECONOMIC STABILITY: INCOME INSECURITY: HOW HARD IS IT FOR YOU TO PAY FOR THE VERY BASICS LIKE FOOD, HOUSING, MEDICAL CARE, AND HEATING?: NOT VERY HARD

## 2022-11-11 SDOH — ECONOMIC STABILITY: FOOD INSECURITY: WITHIN THE PAST 12 MONTHS, THE FOOD YOU BOUGHT JUST DIDN'T LAST AND YOU DIDN'T HAVE MONEY TO GET MORE.: NEVER TRUE

## 2022-11-11 SDOH — ECONOMIC STABILITY: TRANSPORTATION INSECURITY
IN THE PAST 12 MONTHS, HAS THE LACK OF TRANSPORTATION KEPT YOU FROM MEDICAL APPOINTMENTS OR FROM GETTING MEDICATIONS?: NO

## 2022-11-11 SDOH — ECONOMIC STABILITY: TRANSPORTATION INSECURITY
IN THE PAST 12 MONTHS, HAS LACK OF TRANSPORTATION KEPT YOU FROM MEETINGS, WORK, OR FROM GETTING THINGS NEEDED FOR DAILY LIVING?: NO

## 2022-11-11 ASSESSMENT — ENCOUNTER SYMPTOMS
CONSTIPATION: 0
JOINT SWELLING: 0
DIZZINESS: 0
COUGH: 0
FREQUENCY: 1
HEADACHES: 0
DYSURIA: 0
PALPITATIONS: 0
MYALGIAS: 0
DIARRHEA: 0
ARTHRALGIAS: 0
PARESTHESIAS: 0
CHILLS: 0
ABDOMINAL PAIN: 0
NERVOUS/ANXIOUS: 0
HEMATURIA: 0
HEMATOCHEZIA: 0
EYE PAIN: 0
NAUSEA: 0
HEARTBURN: 0

## 2022-11-11 ASSESSMENT — PAIN SCALES - GENERAL: PAINLEVEL: NO PAIN (0)

## 2022-11-11 ASSESSMENT — SOCIAL DETERMINANTS OF HEALTH (SDOH)
DO YOU BELONG TO ANY CLUBS OR ORGANIZATIONS SUCH AS CHURCH GROUPS UNIONS, FRATERNAL OR ATHLETIC GROUPS, OR SCHOOL GROUPS?: YES
IN A TYPICAL WEEK, HOW MANY TIMES DO YOU TALK ON THE PHONE WITH FAMILY, FRIENDS, OR NEIGHBORS?: ONCE A WEEK
HOW OFTEN DO YOU ATTEND CHURCH OR RELIGIOUS SERVICES?: NEVER
HOW OFTEN DO YOU GET TOGETHER WITH FRIENDS OR RELATIVES?: ONCE A WEEK

## 2022-11-11 ASSESSMENT — LIFESTYLE VARIABLES
HOW OFTEN DO YOU HAVE A DRINK CONTAINING ALCOHOL: 2-4 TIMES A MONTH
HOW MANY STANDARD DRINKS CONTAINING ALCOHOL DO YOU HAVE ON A TYPICAL DAY: 1 OR 2
HOW OFTEN DO YOU HAVE SIX OR MORE DRINKS ON ONE OCCASION: NEVER
SKIP TO QUESTIONS 9-10: 1
AUDIT-C TOTAL SCORE: 2

## 2022-11-11 ASSESSMENT — ACTIVITIES OF DAILY LIVING (ADL): CURRENT_FUNCTION: NO ASSISTANCE NEEDED

## 2022-11-11 NOTE — PATIENT INSTRUCTIONS
See your eye doctor yearly.    Lab work today:  We can do labs in the exam room today, or you can get them done downstairs in the lab.      Refills should not be needed until 1 year.    We are doing a prostate specific antigen (PSA) today.     You need another colonoscopy in 2023.    2 shots: flu and covid.     You should get your Prevnar 20 shot at a pharmacy.    Emeka Hale MD  Internal Medicine and Pediatrics

## 2022-11-11 NOTE — PROGRESS NOTES
"SUBJECTIVE:   Paco is a 77 year old who presents for Preventive Visit.    {  Patient has been advised of split billing requirements and indicates understanding: Yes  Are you in the first 12 months of your Medicare coverage?  No    Healthy Habits:     In general, how would you rate your overall health?  Good    Frequency of exercise:  4-5 days/week    Duration of exercise:  15-30 minutes    Do you usually eat at least 4 servings of fruit and vegetables a day, include whole grains    & fiber and avoid regularly eating high fat or \"junk\" foods?  No    Taking medications regularly:  Yes    Medication side effects:  None    Ability to successfully perform activities of daily living:  No assistance needed    Home Safety:  No safety concerns identified    Hearing Impairment:  Difficulty following a conversation in a noisy restaurant or crowded room    In the past 6 months, have you been bothered by leaking of urine? Yes    In general, how would you rate your overall mental or emotional health?  Good      PHQ-2 Total Score: 0    Additional concerns today:  No    Do you feel safe in your environment? Yes    Have you ever done Advance Care Planning? (For example, a Health Directive, POLST, or a discussion with a medical provider or your loved ones about your wishes): Yes, patient states has an Advance Care Planning document and will bring a copy to the clinic.       Fall risk  Fallen 2 or more times in the past year?: No  Any fall with injury in the past year?: No    Cognitive Screening   1) Repeat 3 items (Leader, Season, Table)    2) Clock draw: NORMAL  3) 3 item recall: Recalls 3 objects  Results: 3 items recalled: COGNITIVE IMPAIRMENT LESS LIKELY    Has been feeling tired.  Gets up, exercises, and then feels so tired has to go back to sleep.    Wakes many times at night to urinate.  Takes 0.8 mg at bedtime of flomax (tamsulosin); now only gets up 2-3 times per night. Then also has urinary leakage during the day.    Has " had epidural steroid injections        [facility] Copyright KATIE Aranda. Licensed by the author for use in [facility]; reprinted with permission (fara@.[facility]). All rights reserved.      Do you have sleep apnea, excessive snoring or daytime drowsiness?: no    Reviewed and updated as needed this visit by clinical staff   Tobacco  Allergies  Meds   Med Hx  Surg Hx  Fam Hx  Soc Hx        Reviewed and updated as needed this visit by Provider                 Social History     Tobacco Use     Smoking status: Never     Smokeless tobacco: Never   Substance Use Topics     Alcohol use: Yes     Alcohol/week: 0.0 standard drinks     Comment: 10-12 drinks per week         Alcohol Use 11/11/2022   Prescreen: >3 drinks/day or >7 drinks/week? Yes   Prescreen: >3 drinks/day or >7 drinks/week? -   AUDIT SCORE  9               Current providers sharing in care for this patient include:   Patient Care Team:  Emeka Hale MD as PCP - General  Emeka Hale MD as Assigned PCP  Jeovanny Basilio MD as MD (Urology)    The following health maintenance items are reviewed in Epic and correct as of today:  Health Maintenance   Topic Date Due     ANNUAL REVIEW OF HM ORDERS  12/14/2021     COVID-19 Vaccine (4 - Booster for Pfizer series) 01/06/2022     INFLUENZA VACCINE (1) 09/01/2022     MEDICARE ANNUAL WELLNESS VISIT  11/11/2022     FALL RISK ASSESSMENT  11/11/2022     DEXA  11/12/2024     DTAP/TDAP/TD IMMUNIZATION (3 - Td or Tdap) 11/08/2026     LIPID  11/11/2026     ADVANCE CARE PLANNING  11/12/2026     COLORECTAL CANCER SCREENING  11/28/2028     HEPATITIS C SCREENING  Completed     PHQ-2 (once per calendar year)  Completed     Pneumococcal Vaccine: 65+ Years  Completed     ZOSTER IMMUNIZATION  Completed     IPV IMMUNIZATION  Aged Out     MENINGITIS IMMUNIZATION  Aged Out     Lab work is in process  Labs reviewed in EPIC  BP Readings from Last 3 Encounters:   11/11/22 120/68   09/16/22 136/80   06/09/22 121/68    Wt  Readings from Last 3 Encounters:   11/11/22 90.7 kg (200 lb)   11/11/21 92.5 kg (204 lb)   03/02/21 92.7 kg (204 lb 6.4 oz)                  Patient Active Problem List   Diagnosis     Malignant neoplasm of prostate (H)     Gouty arthropathy     Other psoriasis     Osteoporosis     Leukopenia     Frequency of urination and polyuria     HYPERLIPIDEMIA LDL GOAL <130     Impaired fasting glucose     Fatty liver     Hypertension goal BP (blood pressure) < 140/90     Rectal cancer (H)     Cervical pain     Past Surgical History:   Procedure Laterality Date     APPLY WOUND VAC N/A 10/21/2014    Procedure: APPLY WOUND VAC;  Surgeon: Mir Hernandez MD;  Location:  OR     COLONOSCOPY N/A 10/10/2014    Procedure: COMBINED COLONOSCOPY, SINGLE BIOPSY/POLYPECTOMY BY BIOPSY;  Surgeon: Mir Hernandez MD;  Location:  GI     COLOSTOMY N/A 10/21/2014    Procedure: COLOSTOMY;  Surgeon: Mir Hernandez MD;  Location:  OR     ORTHOPEDIC SURGERY Bilateral 2000 ?    rotater repair both     ORTHOPEDIC SURGERY Right     ankle      RESECTION ABDOMINAL PERINEAL N/A 10/21/2014    Procedure: RESECTION ABDOMINAL PERINEAL;  Surgeon: Mir Hernandez MD;  Location:  OR       Social History     Tobacco Use     Smoking status: Never     Smokeless tobacco: Never   Substance Use Topics     Alcohol use: Yes     Alcohol/week: 0.0 standard drinks     Comment: 10-12 drinks per week     Family History   Problem Relation Age of Onset     Diabetes Brother      Sleep Apnea Brother      Diabetes Sister      Sleep Apnea Sister      Hypertension Sister      Hypertension Brother      Snoring Father          Current Outpatient Medications   Medication Sig Dispense Refill     clobetasol (TEMOVATE) 0.05 % external cream Apply topically 2 times daily 45 g 1     clobetasol (TEMOVATE) 0.05 % external solution Apply topically 2 times daily Apply to scalp 50 mL 2     lisinopril (ZESTRIL) 10 MG tablet Take 1 tablet (10 mg) by mouth daily 90 tablet 3      "simvastatin (ZOCOR) 40 MG tablet Take 1 tablet (40 mg) by mouth At Bedtime 90 tablet 3     tamsulosin (FLOMAX) 0.4 MG capsule Take 2 capsules (0.8 mg) by mouth daily 180 capsule 3     Allergies   Allergen Reactions     Seasonal Allergies              Review of Systems   Constitutional: Negative for chills.   HENT: Negative for congestion, ear pain and hearing loss.    Eyes: Negative for pain.   Respiratory: Negative for cough.    Cardiovascular: Negative for chest pain, palpitations and peripheral edema.   Gastrointestinal: Negative for abdominal pain, constipation, diarrhea, heartburn, hematochezia and nausea.   Genitourinary: Positive for frequency. Negative for dysuria, genital sores and hematuria.   Musculoskeletal: Negative for arthralgias, joint swelling and myalgias.   Neurological: Negative for dizziness, headaches and paresthesias.   Psychiatric/Behavioral: Negative for mood changes. The patient is not nervous/anxious.      CONSTITUTIONAL: NEGATIVE for fever, chills, change in weight  INTEGUMENTARY/SKIN: NEGATIVE for worrisome rashes, moles or lesions  EYES: NEGATIVE for vision changes or irritation  ENT/MOUTH: NEGATIVE for ear, mouth and throat problems  RESP: NEGATIVE for significant cough or SOB  BREAST: NEGATIVE for masses, tenderness or discharge  CV: NEGATIVE for chest pain, palpitations or peripheral edema  GI: NEGATIVE for nausea, abdominal pain, heartburn, or change in bowel habits  : NEGATIVE for frequency, dysuria, or hematuria  MUSCULOSKELETAL: NEGATIVE for significant arthralgias or myalgia  NEURO: NEGATIVE for weakness, dizziness or paresthesias  ENDOCRINE: NEGATIVE for temperature intolerance, skin/hair changes  HEME: NEGATIVE for bleeding problems  PSYCHIATRIC: NEGATIVE for changes in mood or affect    OBJECTIVE:   There were no vitals taken for this visit. Estimated body mass index is 33.95 kg/m  as calculated from the following:    Height as of 11/11/21: 1.651 m (5' 5\").    Weight as of " 11/11/21: 92.5 kg (204 lb).  Physical Exam  GENERAL: healthy, alert and no distress  EYES: Eyes grossly normal to inspection, PERRL and conjunctivae and sclerae normal  HENT: ear canals and TM's normal, nose and mouth without ulcers or lesions  NECK: no adenopathy, no asymmetry, masses, or scars and thyroid normal to palpation  RESP: lungs clear to auscultation - no rales, rhonchi or wheezes  CV: regular rate and rhythm, normal S1 S2, no S3 or S4, no murmur, click or rub, no peripheral edema and peripheral pulses strong  ABDOMEN: soft, nontender, no hepatosplenomegaly, no masses and bowel sounds normal   (male): normal male genitalia without lesions or urethral discharge, no hernia  MS: no gross musculoskeletal defects noted, no edema  SKIN: no suspicious lesions or rashes  NEURO: Normal strength and tone, mentation intact and speech normal  PSYCH: mentation appears normal, affect normal/bright    Diagnostic Test Results:  Labs reviewed in Epic    ASSESSMENT / PLAN:   (R53.83) Other fatigue  (primary encounter diagnosis)  Comment:   Plan: Comprehensive metabolic panel (BMP + Alb, Alk         Phos, ALT, AST, Total. Bili, TP), CBC with         platelets and differential, TSH with free T4         reflex, Vitamin D Deficiency, Vitamin B12        Probably related to his frequent wakening due to urinating.   Increase flomax (tamsulosin).     (L40.8) Other psoriasis  Comment:   Plan: clobetasol (TEMOVATE) 0.05 % external solution,        clobetasol (TEMOVATE) 0.05 % external cream        Refilled.     (E78.5) Hyperlipidemia LDL goal <130  Comment:   Plan: simvastatin (ZOCOR) 40 MG tablet        On statin.  Tolerating well.     (R35.1) Nocturia  Comment:   Plan: tamsulosin (FLOMAX) 0.4 MG capsule            (I10) Benign essential hypertension  Comment:   Plan: lisinopril (ZESTRIL) 10 MG tablet        At goal.     (E55.9) Vitamin D deficiency, unspecified  Comment:   Plan: Vitamin D Deficiency            (C61) Malignant  "neoplasm of prostate (H)  Comment:   Plan: PSA, tumor marker            (R73.01) Impaired fasting glucose  Comment:   Plan: Hemoglobin A1c            (C20) Rectal cancer (H)  Comment:   Plan: ongoing colostomy.     (R32) Urinary incontinence, unspecified type  Comment:   Plan: Adult Urology  Referral        Given daytime leakage, follow up with urology.       Patient has been advised of split billing requirements and indicates understanding: Yes      COUNSELING:  Reviewed preventive health counseling, as reflected in patient instructions       Regular exercise       Healthy diet/nutrition       Vision screening       Hearing screening       Colon cancer screening       Prostate cancer screening    Estimated body mass index is 33.95 kg/m  as calculated from the following:    Height as of 11/11/21: 1.651 m (5' 5\").    Weight as of 11/11/21: 92.5 kg (204 lb).    Weight management plan: Patient was referred to their PCP to discuss a diet and exercise plan.    He reports that he has never smoked. He has never used smokeless tobacco.      Appropriate preventive services were discussed with this patient, including applicable screening as appropriate for cardiovascular disease, diabetes, osteopenia/osteoporosis, and glaucoma.  As appropriate for age/gender, discussed screening for colorectal cancer, prostate cancer, breast cancer, and cervical cancer. Checklist reviewing preventive services available has been given to the patient.    Reviewed patients plan of care and provided an AVS. The Basic Care Plan (routine screening as documented in Health Maintenance) for Paco meets the Care Plan requirement. This Care Plan has been established and reviewed with the Patient.    Counseling Resources:  ATP IV Guidelines  Pooled Cohorts Equation Calculator  Breast Cancer Risk Calculator  Breast Cancer: Medication to Reduce Risk  FRAX Risk Assessment  ICSI Preventive Guidelines  Dietary Guidelines for Americans, 2010  USDA's " MyPlate  ASA Prophylaxis  Lung CA Screening    Emeka Hale MD  [facility]    Identified Health Risks:

## 2022-12-20 DIAGNOSIS — R35.1 NOCTURIA: ICD-10-CM

## 2022-12-20 RX ORDER — TAMSULOSIN HYDROCHLORIDE 0.4 MG/1
CAPSULE ORAL
Qty: 90 CAPSULE | Refills: 2 | OUTPATIENT
Start: 2022-12-20

## 2022-12-20 NOTE — TELEPHONE ENCOUNTER
Patient has year refills on file sent in 11/11/2022 , should have enough to make it to 11/11/2023.     Song Kellogg RN on 12/20/2022 at 3:13 PM

## 2023-03-20 ENCOUNTER — TRANSFERRED RECORDS (OUTPATIENT)
Dept: PEDIATRICS | Facility: CLINIC | Age: 78
End: 2023-03-20

## 2023-04-17 ENCOUNTER — TRANSFERRED RECORDS (OUTPATIENT)
Dept: HEALTH INFORMATION MANAGEMENT | Facility: CLINIC | Age: 78
End: 2023-04-17
Payer: COMMERCIAL

## 2023-04-24 ENCOUNTER — OFFICE VISIT (OUTPATIENT)
Dept: URGENT CARE | Facility: URGENT CARE | Age: 78
End: 2023-04-24
Payer: COMMERCIAL

## 2023-04-24 VITALS
TEMPERATURE: 97.9 F | OXYGEN SATURATION: 99 % | DIASTOLIC BLOOD PRESSURE: 83 MMHG | SYSTOLIC BLOOD PRESSURE: 145 MMHG | WEIGHT: 200 LBS | BODY MASS INDEX: 32.14 KG/M2 | HEART RATE: 68 BPM | HEIGHT: 66 IN | RESPIRATION RATE: 14 BRPM

## 2023-04-24 DIAGNOSIS — M10.9 ACUTE GOUT OF RIGHT KNEE, UNSPECIFIED CAUSE: Primary | ICD-10-CM

## 2023-04-24 PROCEDURE — 99214 OFFICE O/P EST MOD 30 MIN: CPT | Performed by: FAMILY MEDICINE

## 2023-04-24 RX ORDER — PREDNISONE 20 MG/1
TABLET ORAL
Qty: 15 TABLET | Refills: 0 | Status: SHIPPED | OUTPATIENT
Start: 2023-04-24 | End: 2023-05-15

## 2023-04-24 ASSESSMENT — PAIN SCALES - GENERAL: PAINLEVEL: EXTREME PAIN (9)

## 2023-04-24 NOTE — PROGRESS NOTES
Assessment & Plan     Acute gout of right knee, unspecified cause  - predniSONE (DELTASONE) 20 MG tablet  Dispense: 15 tablet; Refill: 0       Patient does have a little bit of right lower leg swelling I did advise that he go in to have an ultrasound to be done he would like to proceed with treatment for gout first as he has little concern for a DVT at this time.    We will proceed with prednisone for 10-day taper along with recommending NSAIDs to help with this possible gout flare.  He understands to seek medical attention right away if symptoms worsen    Chin Billings MD   [facility]    Toñito Antonio is a 77 year old male who presents to clinic today for the following health issues:  Chief Complaint   Patient presents with     Urgent Care     Patient states he was just seen in min. Clinic and the provider told him to go to the ED to rule out blood clot in right leg/knee Patient states he thinks its gout.      HPI    Symptoms starting in the last 2 days where he is having pain in the right knee area it does radiate downwards into his calf area but primarily the warmth and discomfort of his right knee without any history of knee injury is peculiar.  He has a history of gout in the last 10 years and reports that recently he has been indulging in more alcohol and red meats.  The achiness and warmth seems similar to presentation of gout in his foot and ankle from the past.     He has no hx of blood clots.  Patient does not smoke.  He is not currently being treated for active cancer.  Has not had COVID in the last 3 months.  Denies any recent procedures or injuries.      Patient Active Problem List    Diagnosis Date Noted     Cervical pain 10/04/2017     Priority: Medium     Rectal cancer (H) 10/10/2014     Priority: Medium     Colostomy         Hypertension goal BP (blood pressure) < 140/90 10/08/2013     Priority: Medium     Fatty liver 02/09/2011     Priority: Medium     Impaired fasting  "glucose 07/26/2010     Priority: Medium     HYPERLIPIDEMIA LDL GOAL <130 02/10/2010     Priority: Medium     Frequency of urination and polyuria 12/15/2009     Priority: Medium     Leukopenia 11/07/2008     Priority: Medium     WBC 3.6 in 11/08.       Osteoporosis 10/10/2006     Priority: Medium     likely due to anti-androgen therapy.  Problem list name updated by automated process. Provider to review       Malignant neoplasm of prostate (H)      Priority: Medium     Previously on casodex and Lupron until 2012       Gouty arthropathy      Priority: Medium     Problem list name updated by automated process. Provider to review and confirm  [employer] Update utility       Other psoriasis      Priority: Medium       Current Outpatient Medications   Medication     predniSONE (DELTASONE) 20 MG tablet     clobetasol (TEMOVATE) 0.05 % external cream     clobetasol (TEMOVATE) 0.05 % external solution     lisinopril (ZESTRIL) 10 MG tablet     simvastatin (ZOCOR) 40 MG tablet     tamsulosin (FLOMAX) 0.4 MG capsule     No current facility-administered medications for this visit.     Facility-Administered Medications Ordered in Other Visits   Medication     iohexol (OMNIPAQUE) 300 mg/mL injection 10 mL           Objective    BP (!) 145/83   Pulse 68   Temp 97.9  F (36.6  C) (Oral)   Resp 14   Ht 1.676 m (5' 6\")   Wt 90.7 kg (200 lb)   SpO2 99%   BMI 32.28 kg/m    Physical Exam   GEN: NAD  Pulm: non-labored  Right knee: warmth and mild swelling compared to left side  Legs: trace swelling of R lower leg compared to Left. Emilia's negative.   Gait: normal    No results found for any visits on 04/24/23.                  The use of Dragon/[employer] dictation services may have been used to construct the content in this note; any grammatical or spelling errors are non-intentional. Please contact the author of this note directly if you are in need of any clarification.   "

## 2023-04-24 NOTE — PATIENT INSTRUCTIONS
Prednisone take as prescribed in the morning as your anti-inflammatory/steroid      Ibuprofen 600 mg take every 4 hours as needed for discomfort      Ice the area 10 minutes every 1/2 hour or so to help with inflammation      You see worsening lower leg pain or swelling please call your doctors office right away      If you see no improvement within 2 days please return to be evaluated

## 2023-05-15 ENCOUNTER — OFFICE VISIT (OUTPATIENT)
Dept: PEDIATRICS | Facility: CLINIC | Age: 78
End: 2023-05-15
Payer: COMMERCIAL

## 2023-05-15 VITALS
OXYGEN SATURATION: 98 % | RESPIRATION RATE: 18 BRPM | SYSTOLIC BLOOD PRESSURE: 111 MMHG | TEMPERATURE: 98.1 F | HEART RATE: 83 BPM | DIASTOLIC BLOOD PRESSURE: 69 MMHG | WEIGHT: 204 LBS | BODY MASS INDEX: 32.93 KG/M2

## 2023-05-15 DIAGNOSIS — Z85.048 PERSONAL HISTORY OF RECTAL CANCER: ICD-10-CM

## 2023-05-15 DIAGNOSIS — M25.561 ACUTE PAIN OF RIGHT KNEE: Primary | ICD-10-CM

## 2023-05-15 DIAGNOSIS — M17.11 PRIMARY OSTEOARTHRITIS OF RIGHT KNEE: ICD-10-CM

## 2023-05-15 PROCEDURE — 99214 OFFICE O/P EST MOD 30 MIN: CPT | Performed by: INTERNAL MEDICINE

## 2023-05-15 RX ORDER — LORAZEPAM 1 MG/1
TABLET ORAL
Qty: 2 TABLET | Refills: 0 | Status: SHIPPED | OUTPATIENT
Start: 2023-05-15 | End: 2023-12-26

## 2023-05-15 RX ORDER — CELECOXIB 200 MG/1
1 CAPSULE ORAL DAILY
COMMUNITY
Start: 2023-05-12 | End: 2023-12-26

## 2023-05-15 ASSESSMENT — PAIN SCALES - GENERAL: PAINLEVEL: WORST PAIN (10)

## 2023-05-15 NOTE — PATIENT INSTRUCTIONS
MRI folks will call you.    You can neoprene knee sleeve; wear during day, remove at night.    Begin ice three times daily for 15 min.    May take celebrex 200 mg daily, and then also take tylenol 1000 mg three times daily.    Emeka Hale MD  Internal Medicine and Pediatrics

## 2023-05-15 NOTE — PROGRESS NOTES
"  Assessment & Plan     Acute pain of right knee    Likely osteoarthritis related, but patient prefers MRI given severity of pain and limitation of mvt.     Patient Instructions   MRI folks will call you.    You can neoprene knee sleeve; wear during day, remove at night.    Begin ice three times daily for 15 min.    May take celebrex 200 mg daily, and then also take tylenol 1000 mg three times daily.    Emeka Hale MD  Internal Medicine and Pediatrics        - MR Knee Right w/o Contrast; Future  - LORazepam (ATIVAN) 1 MG tablet; Take 1 hour before MRI; may repeat in 1 hour if still anxious    Primary osteoarthritis of right knee  Begin physical therapy.  - Physical Therapy Referral; Future    Personal history of rectal cancer  Colostomy functioning well.                BMI:   Estimated body mass index is 32.93 kg/m  as calculated from the following:    Height as of 4/24/23: 1.676 m (5' 6\").    Weight as of this encounter: 92.5 kg (204 lb).   Weight management plan: Patient was referred to their PCP to discuss a diet and exercise plan.    Patient Instructions   MRI folks will call you.    You can neoprene knee sleeve; wear during day, remove at night.    Begin ice three times daily for 15 min.    May take celebrex 200 mg daily, and then also take tylenol 1000 mg three times daily.    Emeka Hale MD  Internal Medicine and Pediatrics         Emeka Hale MD  [facility] VIVIAN Antonio is a 78 year old, presenting for the following health issues:  Knee Pain (X3 weeks, right knee pain and had a cortisone shot x 2 weeks ago but pain has not subsided. When stepping pain is 10/10)        5/15/2023     3:08 PM   Additional Questions   Roomed by MARIANN Aguillon   Accompanied by Self         5/15/2023     3:08 PM   Patient Reported Additional Medications   Patient reports taking the following new medications Celecoxib     Knee Pain  This is a new problem. The current episode started 1 to 4 weeks ago. " The problem occurs daily. The symptoms are aggravated by walking. He has tried acetaminophen for the symptoms.   History of Present Illness       Reason for visit:  Right Knee Pain    He eats 0-1 servings of fruits and vegetables daily.He consumes 2 sweetened beverage(s) daily.He exercises with enough effort to increase his heart rate 20 to 29 minutes per day.  He exercises with enough effort to increase his heart rate 7 days per week.   He is taking medications regularly.     Right knee pain for last 3 weeks.  Suddenly started to hurt while walking.  Has had right osteoarthritis of knee; had cortisone shot 2 weeks ago with limited relief.  Suggested an MRI.    No know sports injury or accident.  Does do leg presses at times.      Taking celebrex:  200 mg. Takes tylenol (500) or ibuprofen (400 mg) up to every 4hous.                 Review of Systems   CONSTITUTIONAL: NEGATIVE for fever, chills, change in weight  INTEGUMENTARY/SKIN: NEGATIVE for worrisome rashes, moles or lesions  EYES: NEGATIVE for vision changes or irritation  ENT/MOUTH: NEGATIVE for ear, mouth and throat problems  RESP: NEGATIVE for significant cough or SOB  BREAST: NEGATIVE for masses, tenderness or discharge  CV: NEGATIVE for chest pain, palpitations or peripheral edema  GI: NEGATIVE for nausea, abdominal pain, heartburn, or change in bowel habits  : NEGATIVE for frequency, dysuria, or hematuria  MUSCULOSKELETAL: NEGATIVE for significant arthralgias or myalgia  NEURO: NEGATIVE for weakness, dizziness or paresthesias  ENDOCRINE: NEGATIVE for temperature intolerance, skin/hair changes  HEME: NEGATIVE for bleeding problems  PSYCHIATRIC: NEGATIVE for changes in mood or affect      Objective    /69   Pulse 83   Temp 98.1  F (36.7  C) (Oral)   Resp 18   Wt 92.5 kg (204 lb)   SpO2 98%   BMI 32.93 kg/m    Body mass index is 32.93 kg/m .  Physical Exam   GENERAL: healthy, alert and no distress  EYES: Eyes grossly normal to inspection, PERRL  and conjunctivae and sclerae normal  HENT: ear canals and TM's normal, nose and mouth without ulcers or lesions  NECK: no adenopathy, no asymmetry, masses, or scars and thyroid normal to palpation  RESP: lungs clear to auscultation - no rales, rhonchi or wheezes  CV: regular rate and rhythm, normal S1 S2, no S3 or S4, no murmur, click or rub, no peripheral edema and peripheral pulses strong  ABDOMEN: soft, nontender, no hepatosplenomegaly, no masses and bowel sounds normal  MS: no gross musculoskeletal defects noted, no edema  SKIN: no suspicious lesions or rashes  NEURO: Normal strength and tone, mentation intact and speech normal  PSYCH: mentation appears normal, affect normal/bright

## 2023-05-17 ENCOUNTER — THERAPY VISIT (OUTPATIENT)
Dept: PHYSICAL THERAPY | Facility: CLINIC | Age: 78
End: 2023-05-17
Attending: INTERNAL MEDICINE
Payer: COMMERCIAL

## 2023-05-17 DIAGNOSIS — M17.11 PRIMARY OSTEOARTHRITIS OF RIGHT KNEE: Primary | ICD-10-CM

## 2023-05-17 DIAGNOSIS — R29.898 DECREASED STRENGTH INVOLVING KNEE JOINT: ICD-10-CM

## 2023-05-17 PROCEDURE — 97161 PT EVAL LOW COMPLEX 20 MIN: CPT | Mod: GP | Performed by: PHYSICAL THERAPIST

## 2023-05-17 PROCEDURE — 97110 THERAPEUTIC EXERCISES: CPT | Mod: GP | Performed by: PHYSICAL THERAPIST

## 2023-05-17 ASSESSMENT — ACTIVITIES OF DAILY LIVING (ADL)
WALK: ACTIVITY IS FAIRLY DIFFICULT
KNEE_ACTIVITY_OF_DAILY_LIVING_SCORE: 61.43
STAND: ACTIVITY IS MINIMALLY DIFFICULT
LIMPING: THE SYMPTOM AFFECTS MY ACTIVITY MODERATELY
GIVING WAY, BUCKLING OR SHIFTING OF KNEE: THE SYMPTOM AFFECTS MY ACTIVITY MODERATELY
RAW_SCORE: 43
WEAKNESS: THE SYMPTOM AFFECTS MY ACTIVITY MODERATELY
SQUAT: ACTIVITY IS MINIMALLY DIFFICULT
PAIN: THE SYMPTOM PREVENTS ME FROM ALL DAILY ACTIVITIES
HOW_WOULD_YOU_RATE_THE_OVERALL_FUNCTION_OF_YOUR_KNEE_DURING_YOUR_USUAL_DAILY_ACTIVITIES?: ABNORMAL
RISE FROM A CHAIR: ACTIVITY IS SOMEWHAT DIFFICULT
SWELLING: I HAVE THE SYMPTOM BUT IT DOES NOT AFFECT MY ACTIVITY
GO DOWN STAIRS: ACTIVITY IS SOMEWHAT DIFFICULT
AS_A_RESULT_OF_YOUR_KNEE_INJURY,_HOW_WOULD_YOU_RATE_YOUR_CURRENT_LEVEL_OF_DAILY_ACTIVITY?: ABNORMAL
KNEE_ACTIVITY_OF_DAILY_LIVING_SUM: 43
SIT WITH YOUR KNEE BENT: ACTIVITY IS NOT DIFFICULT
HOW_WOULD_YOU_RATE_THE_CURRENT_FUNCTION_OF_YOUR_KNEE_DURING_YOUR_USUAL_DAILY_ACTIVITIES_ON_A_SCALE_FROM_0_TO_100_WITH_100_BEING_YOUR_LEVEL_OF_KNEE_FUNCTION_PRIOR_TO_YOUR_INJURY_AND_0_BEING_THE_INABILITY_TO_PERFORM_ANY_OF_YOUR_USUAL_DAILY_ACTIVITIES?: 50
GO UP STAIRS: ACTIVITY IS SOMEWHAT DIFFICULT
STIFFNESS: I DO NOT HAVE THE SYMPTOM
KNEEL ON THE FRONT OF YOUR KNEE: ACTIVITY IS MINIMALLY DIFFICULT

## 2023-05-17 NOTE — PROGRESS NOTES
PHYSICAL THERAPY EVALUATION  Type of Visit: Evaluation    See electronic medical record for Abuse and Falls Screening details.    Subjective      Presenting condition or subjective complaint: R knee pain, started 3 weeks ago.  Pt went to urgent care and an xray showed arthritis.  Pt then went to an orthopedic MD who gave a cortisone injection which hasn't helped as of yet, this was 2 weeks ago.  Pt typically works out daily at the gym, hasn't been doing anything since he hurt it.  Pt has started wearing a knee brace as of yesterday.  Pt doesn't know of anything that may have caused his knee pain.  Date of onset: 04/26/23    Relevant medical history: High blood pressure   Dates & types of surgery:      Prior diagnostic imaging/testing results: X-ray     Prior therapy history for the same diagnosis, illness or injury: No      Prior Level of Function   Transfers: Independent  Ambulation: Independent  ADL: Independent  IADL: Housekeeping, Yard work    Living Environment  Social support:     Type of home: House   Stairs to enter the home: Yes 2 Is there a railing: No   Ramp: No   Stairs inside the home: Yes 20 Is there a railing: Yes   Help at home: None  Equipment owned:       Employment: No retired  Hobbies/Interests:      Patient goals for therapy: walk comfortably    Pain assessment: Location: R knee/Rating: 10/10     Objective   KNEE EVALUATION  PAIN: Pain Level at Rest: 0/10  Pain Location: knee  Pain Quality: Sharp  Pain Frequency: intermittent  Pain is Exacerbated By: standing up, walking, stairs  Pain is Relieved By: rest  Pain Progression: Improved  INTEGUMENTARY (edema, incisions): WNL  POSTURE: WNL  GAIT:   Weightbearing Status: WBAT  Assistive Device(s): Brace  Gait Deviations: Antalgic  Balance/Proprioception: WNL  Weight Bearing Alignment: Knee WB Alignment:WNL  Non-Weight Bearing Alignment: WNL   ROM:   (Degrees) Left AROM Left PROM  Right AROM Right PROM   Knee Flexion wnl wnl wnl wnl   Knee Extension  wnl wnl -1 erp -1 erp   Pain: at end range extension   End feel: empty    Strength:   Pain: - none + mild ++ moderate +++ severe  Strength Scale: 0-5/5 Left Right   Knee Flexion     Knee Extension 5 4   Quad Set 5 4     FLEXIBILITY: WFL  Special Tests: WFL  FUNCTIONAL TESTS: doesn't tolerate due to pain  PALPATION:   + Tenderness At Location Left Right   Medial Joint Line - +   Lateral Joint Line - -   Patellar Tendon - -   IT Band - -   Incisional - -   Popliteal - -   Biceps Femoris - -   Semitendinosis - -   Semimembranosis - -   Glut Medius - -   Patellar Medial - -   Patellar Lateral - -   Patellar Superior - -   Patellar Inferior  - -     JOINT MOBILITY: WNL      Assessment & Plan   CLINICAL IMPRESSIONS   Medical Diagnosis: R knee osteoarthritis    Treatment Diagnosis: R knee pain   Impression/Assessment: Patient is a 78 year old male with R knee pain complaints.  The following significant findings have been identified: Pain, Decreased ROM/flexibility, Decreased strength, Impaired gait, Impaired muscle performance and Decreased activity tolerance. These impairments interfere with their ability to perform self care tasks, recreational activities, household chores, driving , household mobility and community mobility as compared to previous level of function.     Clinical Decision Making (Complexity):   Clinical Presentation: Stable/Uncomplicated  Clinical Presentation Rationale: based on medical and personal factors listed in PT evaluation  Clinical Decision Making (Complexity): Low complexity    PLAN OF CARE  Treatment Interventions:  Modalities: Cryotherapy  Interventions: Gait Training, Manual Therapy, Neuromuscular Re-education, Therapeutic Activity, Therapeutic Exercise, Self-Care/Home Management    Long Term Goals     PT Goal 1  Goal Identifier: Ambulation  Goal Description: duration: 10 minutes, normal gait  Rationale: to maximize safety and independence within the home  Target Date: 06/14/23  PT Goal  2  Goal Identifier: Ambulation  Goal Description: duration: 30 minutes, normal gait  Rationale: to maximize safety and independence within the community  Target Date: 07/12/23      Frequency of Treatment: 1x/week  Duration of Treatment: 8 weeks    Recommended Referrals to Other Professionals: none  Education Assessment:   Learner/Method: Patient    Risks and benefits of evaluation/treatment have been explained.   Patient/Family/caregiver agrees with Plan of Care.     Evaluation Time:     PT Eval, Low Complexity Minutes (53110): 25    Signing Clinician: LATISHA [facility]                                                                                   OUTPATIENT PHYSICAL THERAPY      PLAN OF TREATMENT FOR OUTPATIENT REHABILITATION   Patient's Last Name, First Name, Paco Cabrera YOB: 1945   Provider's Name   [facility]   Medical Record No.  [id number]     Onset Date: 04/26/23  Start of Care Date: 05/17/23     Medical Diagnosis:  R knee osteoarthritis      PT Treatment Diagnosis:  R knee pain Plan of Treatment  Frequency/Duration: 1x/week/ 8 weeks    Certification date from 05/17/23 to 07/12/23         See note for plan of treatment details and functional goals     Jaquan Kauffman PT                         I CERTIFY THE NEED FOR THESE SERVICES FURNISHED UNDER        THIS PLAN OF TREATMENT AND WHILE UNDER MY CARE     (Physician attestation of this document indicates review and certification of the therapy plan).                  Referring Provider:  Emeka Hale      Initial Assessment  See Epic Evaluation- Start of Care Date: 05/17/23

## 2023-05-17 NOTE — PROGRESS NOTES
[facility]    OUTPATIENT Physical Therapy ORTHOPEDIC EVALUATION  PLAN OF TREATMENT FOR OUTPATIENT REHABILITATION  (COMPLETE FOR INITIAL CLAIMS ONLY)  Patient's Last Name, First Name, M.I.  YOB: 1945  Paco Eckert    Provider s Name:  [facility]   Medical Record No.  [id number]   Start of Care Date:      Onset Date:       Treatment Diagnosis:    Medical Diagnosis:  Primary osteoarthritis of right knee       Goals:     05/17/23 0500   Appointment Info   Signing clinician's name / credentials Felix Kauffman DPT   Total/Authorized Visits 8   Visits Used 1   Medical Diagnosis R knee osteoarthritis   PT Tx Diagnosis R knee pain   Other pertinent information antalgic gait, pain with TKE R knee   Quick Adds Certification   Progress Note/Certification   Start of Care Date 05/17/23   Onset of illness/injury or Date of Surgery 04/26/23   Therapy Frequency 1x/week   Predicted Duration 8 weeks   Certification date from 05/17/23   Certification date to 07/12/23   Progress Note Due Date 07/12/23       Present No   GOALS   PT Goals 2   PT Goal 1   Goal Identifier Ambulation   Goal Description duration: 10 minutes, normal gait   Rationale to maximize safety and independence within the home   Target Date 06/14/23   PT Goal 2   Goal Identifier Ambulation   Goal Description duration: 30 minutes, normal gait   Rationale to maximize safety and independence within the community   Target Date 07/12/23   Subjective Report   Subjective Report see eval note   Treatment Interventions (PT)   Interventions Therapeutic Procedure/Exercise   Therapeutic Procedure/Exercise   Therapeutic Procedures: strength, endurance, ROM, flexibillity minutes (37365) 15   PTRx Ther Proc 1 Supine Heel Slides   PTRx Ther Proc 1 - Details 5x   PTRx Ther Proc 2 Ankle Pumps in Long Sitting   PTRx  Ther Proc 2 - Details 5x   PTRx Ther Proc 3 Hip Flexion Straight Leg Raise   PTRx Ther Proc 3 - Details 5x cues for TKE   PTRx Ther Proc 4 Bridging #1   PTRx Ther Proc 4 - Details 5x cues for glute activation   PTRx Ther Proc 5 Hip Abduction Straight Leg Raise   PTRx Ther Proc 5 - Details 5x   PTRx Ther Proc 6 Clamshell Feet together   PTRx Ther Proc 6 - Details 5x   Neuromuscular Re-education   PTRx Neuro Re-ed 1 Isometric Quad   PTRx Neuro Re-ed 1 - Details 5x 5 sec hold   Eval/Assessments   PT Eval, Low Complexity Minutes (85606) 25   Education   Learner/Method Patient   Total Session Time   Timed Code Treatment Minutes 15   Total Treatment Time (sum of timed and untimed services) 40         Therapy Frequency:  1x/week  Predicted Duration of Therapy Intervention:       Jaquan Kauffman, PT                 I CERTIFY THE NEED FOR THESE SERVICES FURNISHED UNDER        THIS PLAN OF TREATMENT AND WHILE UNDER MY CARE     (Physician attestation of this document indicates review and certification of the therapy plan).                     Certification Date From:      Certification Date To:       Referring Provider:  Emeka Hale    Initial Assessment        See Epic Evaluation

## 2023-06-10 ENCOUNTER — HOSPITAL ENCOUNTER (OUTPATIENT)
Dept: MRI IMAGING | Facility: CLINIC | Age: 78
Discharge: HOME | End: 2023-06-10
Attending: INTERNAL MEDICINE | Admitting: INTERNAL MEDICINE
Payer: COMMERCIAL

## 2023-06-10 DIAGNOSIS — M25.561 ACUTE PAIN OF RIGHT KNEE: ICD-10-CM

## 2023-06-10 PROCEDURE — 73721 MRI JNT OF LWR EXTRE W/O DYE: CPT | Mod: RT

## 2023-06-13 ENCOUNTER — TELEPHONE (OUTPATIENT)
Dept: PEDIATRICS | Facility: CLINIC | Age: 78
End: 2023-06-13
Payer: COMMERCIAL

## 2023-06-13 DIAGNOSIS — M84.353D: ICD-10-CM

## 2023-06-13 DIAGNOSIS — S83.209D CURRENT TEAR OF MENISCUS OF KNEE, UNSPECIFIED LATERALITY, UNSPECIFIED MENISCUS, UNSPECIFIED TEAR TYPE, SUBSEQUENT ENCOUNTER: Primary | ICD-10-CM

## 2023-06-13 DIAGNOSIS — M25.561 ACUTE PAIN OF RIGHT KNEE: ICD-10-CM

## 2023-06-13 NOTE — TELEPHONE ENCOUNTER
Received call back from patient. Patient is ok with orthopedic referral, would like to be seen at [facility] which is where his wife goes to be seen. Routing to verify dx for referral.     Jack MADRID RN 6/13/2023 at 2:55 PM

## 2023-06-13 NOTE — TELEPHONE ENCOUNTER
Attempted to reach patient, LVMTCB. See provider result note below.     Jack MADRID RN 6/13/2023 at 2:10 PM

## 2023-06-13 NOTE — TELEPHONE ENCOUNTER
----- Message from Darcy Larson MD sent at 6/13/2023  1:37 PM CDT -----  Please call him. MRI shows   IMPRESSION:  1.  Acute nondisplaced medial femoral condylar insufficiency/stress fracture with intense bone marrow edema.  2.  Complex multidirectional tear medial meniscus with high-grade radial component at the posterior root. Associated peripheral meniscal extrusion.  3.  Broad-based high-grade medial compartment cartilage loss.  4.  Degeneration and fraying posterior horn/posterior root lateral meniscus.  5.  Large joint effusion. Small, complex partially ruptured Baker's cyst.     He needs to see orthopedics. I'll prep a referral. Sign if he is in agreement. If he prefers a certain clinic, he should just check with insurance. Should be seen within a few days.    Darcy Larson M.D.

## 2023-08-03 DIAGNOSIS — L40.8 OTHER PSORIASIS: ICD-10-CM

## 2023-08-03 RX ORDER — CLOBETASOL PROPIONATE 0.5 MG/ML
SOLUTION TOPICAL 2 TIMES DAILY
Qty: 50 ML | Refills: 2 | Status: SHIPPED | OUTPATIENT
Start: 2023-08-03 | End: 2023-12-26

## 2023-08-03 NOTE — TELEPHONE ENCOUNTER
Routing refill request to provider for review/approval because:  Drug not on the [facility] refill protocol     Elinor SY RN, BSN

## 2023-10-13 ENCOUNTER — PATIENT OUTREACH (OUTPATIENT)
Dept: CARE COORDINATION | Facility: CLINIC | Age: 78
End: 2023-10-13
Payer: COMMERCIAL

## 2023-11-13 DIAGNOSIS — E78.5 HYPERLIPIDEMIA LDL GOAL <130: ICD-10-CM

## 2023-11-13 DIAGNOSIS — I10 BENIGN ESSENTIAL HYPERTENSION: ICD-10-CM

## 2023-11-13 RX ORDER — LISINOPRIL 10 MG/1
10 TABLET ORAL DAILY
Qty: 90 TABLET | Refills: 0 | Status: SHIPPED | OUTPATIENT
Start: 2023-11-13 | End: 2023-12-26

## 2023-11-13 RX ORDER — SIMVASTATIN 40 MG
40 TABLET ORAL AT BEDTIME
Qty: 90 TABLET | Refills: 0 | Status: SHIPPED | OUTPATIENT
Start: 2023-11-13 | End: 2023-12-26

## 2023-12-11 ENCOUNTER — OFFICE VISIT (OUTPATIENT)
Dept: URGENT CARE | Facility: URGENT CARE | Age: 78
End: 2023-12-11
Payer: COMMERCIAL

## 2023-12-11 VITALS
DIASTOLIC BLOOD PRESSURE: 68 MMHG | SYSTOLIC BLOOD PRESSURE: 122 MMHG | HEART RATE: 78 BPM | TEMPERATURE: 97.3 F | OXYGEN SATURATION: 98 %

## 2023-12-11 DIAGNOSIS — Z76.89 ENCOUNTER FOR WEIGHT MANAGEMENT: ICD-10-CM

## 2023-12-11 DIAGNOSIS — J01.00 ACUTE NON-RECURRENT MAXILLARY SINUSITIS: Primary | ICD-10-CM

## 2023-12-11 PROCEDURE — 99214 OFFICE O/P EST MOD 30 MIN: CPT | Performed by: FAMILY MEDICINE

## 2023-12-11 NOTE — PROGRESS NOTES
Assessment & Plan     Acute non-recurrent maxillary sinusitis  7 days therapy augmentin appropriate given duration of symptoms. Exam as noted below. Advised he stop afrin at this time and limit to no more than 3 days use at a time  - amoxicillin-clavulanate (AUGMENTIN) 875-125 MG tablet  Dispense: 14 tablet; Refill: 0    Encounter for weight management     Provide counseling on improving body composition focusing on adequate protein intake, fluid intake along with keeping calories below 3000 a day. Encouraged he favor towards a regimented weight lifting program that tracks progress. Also encouraged body composition tests at his [employer] to be done every 6-8 weeks to measure progress.     25 minutes spent on the date of the encounter doing chart review, history and exam, documentation and further activities per the note.       Chin Billings MD   [place] [facility]    Toñito Antonio is a 78 year old male who presents to clinic today for the following health issues:  Chief Complaint   Patient presents with    Urgent Care     Sinus infection for 2 weeks, mild cough. Nasal congestion, facial pressure.      HPI    Last sinus infection  1 year ago. No cough/shortness of breath present. Thick nasal drainage. Tried afrin for 3-5 days. Maxillary sinus pressure. No fevers.     Patient discouraged with his weight has tried low carb diets, does about 2-3 days of ellipitical/treadmills a week without any weight lifting.     Patient Active Problem List    Diagnosis Date Noted    Primary osteoarthritis of right knee 05/17/2023     Priority: Medium    Personal history of rectal cancer 05/15/2023     Priority: Medium     colostomy      Cervical pain 10/04/2017     Priority: Medium    Hypertension goal BP (blood pressure) < 140/90 10/08/2013     Priority: Medium    Fatty liver 02/09/2011     Priority: Medium    Impaired fasting glucose 07/26/2010     Priority: Medium    HYPERLIPIDEMIA LDL GOAL <130 02/10/2010      Priority: Medium    Frequency of urination and polyuria 12/15/2009     Priority: Medium    Leukopenia 11/07/2008     Priority: Medium     WBC 3.6 in 11/08.      Osteoporosis 10/10/2006     Priority: Medium     likely due to anti-androgen therapy.  Problem list name updated by automated process. Provider to review      Malignant neoplasm of prostate (H)      Priority: Medium     Previously on casodex and Lupron until 2012      Gouty arthropathy      Priority: Medium     Problem list name updated by automated process. Provider to review and confirm  [employer] Update utility      Other psoriasis      Priority: Medium     Current Outpatient Medications   Medication    amoxicillin-clavulanate (AUGMENTIN) 875-125 MG tablet    celecoxib (CELEBREX) 200 MG capsule    clobetasol (TEMOVATE) 0.05 % external cream    clobetasol (TEMOVATE) 0.05 % external solution    lisinopril (ZESTRIL) 10 MG tablet    LORazepam (ATIVAN) 1 MG tablet    simvastatin (ZOCOR) 40 MG tablet    tamsulosin (FLOMAX) 0.4 MG capsule     No current facility-administered medications for this visit.     Facility-Administered Medications Ordered in Other Visits   Medication    iohexol (OMNIPAQUE) 300 mg/mL injection 10 mL         Objective    /68 (BP Location: Right arm, Patient Position: Sitting, Cuff Size: Adult Regular)   Pulse 78   Temp 97.3  F (36.3  C) (Tympanic)   SpO2 98%   Physical Exam       GEN: NAD  Nose: thick nasal drainage R> L, no obvious polpys  PUlm: no distress  No results found for any visits on 12/11/23.            The use of [email or web address] dictation services may have been used to construct the content in this note; any grammatical or spelling errors are non-intentional. Please contact the author of this note directly if you are in need of any clarification.

## 2023-12-11 NOTE — PATIENT INSTRUCTIONS
Augmentin antibiotic twice a day for 1 week      --    1) Body composition checks every 6-8 weeks  2) Eat 120 grams of protein a day  3) keep total calories per day under 3000  4) Drink  ounces of water/fluids daily  5) rather than doing running/elliptical do resistance training/weight lifting 3 days a week

## 2023-12-26 ENCOUNTER — OFFICE VISIT (OUTPATIENT)
Dept: PEDIATRICS | Facility: CLINIC | Age: 78
End: 2023-12-26
Payer: COMMERCIAL

## 2023-12-26 VITALS
TEMPERATURE: 98.6 F | HEIGHT: 66 IN | DIASTOLIC BLOOD PRESSURE: 62 MMHG | HEART RATE: 80 BPM | RESPIRATION RATE: 14 BRPM | SYSTOLIC BLOOD PRESSURE: 116 MMHG | BODY MASS INDEX: 31.31 KG/M2 | OXYGEN SATURATION: 98 % | WEIGHT: 194.8 LBS

## 2023-12-26 DIAGNOSIS — I10 HYPERTENSION GOAL BP (BLOOD PRESSURE) < 140/90: ICD-10-CM

## 2023-12-26 DIAGNOSIS — M17.11 PRIMARY OSTEOARTHRITIS OF RIGHT KNEE: ICD-10-CM

## 2023-12-26 DIAGNOSIS — Z29.11 NEED FOR VACCINATION AGAINST RESPIRATORY SYNCYTIAL VIRUS: ICD-10-CM

## 2023-12-26 DIAGNOSIS — R35.1 NOCTURIA: ICD-10-CM

## 2023-12-26 DIAGNOSIS — Z12.11 SCREEN FOR COLON CANCER: ICD-10-CM

## 2023-12-26 DIAGNOSIS — D69.6 DISORDER INVOLVING THROMBOCYTOPENIA (H): ICD-10-CM

## 2023-12-26 DIAGNOSIS — I10 BENIGN ESSENTIAL HYPERTENSION: ICD-10-CM

## 2023-12-26 DIAGNOSIS — C61 MALIGNANT NEOPLASM OF PROSTATE (H): ICD-10-CM

## 2023-12-26 DIAGNOSIS — Z00.00 ENCOUNTER FOR MEDICARE ANNUAL WELLNESS EXAM: Primary | ICD-10-CM

## 2023-12-26 DIAGNOSIS — R73.01 IMPAIRED FASTING GLUCOSE: ICD-10-CM

## 2023-12-26 DIAGNOSIS — E78.5 HYPERLIPIDEMIA LDL GOAL <130: ICD-10-CM

## 2023-12-26 DIAGNOSIS — L40.8 OTHER PSORIASIS: ICD-10-CM

## 2023-12-26 LAB
ALBUMIN SERPL BCG-MCNC: 4.4 G/DL (ref 3.5–5.2)
ALP SERPL-CCNC: 67 U/L (ref 40–150)
ALT SERPL W P-5'-P-CCNC: 12 U/L (ref 0–70)
ANION GAP SERPL CALCULATED.3IONS-SCNC: 11 MMOL/L (ref 7–15)
AST SERPL W P-5'-P-CCNC: 19 U/L (ref 0–45)
BASOPHILS # BLD AUTO: 0 10E3/UL (ref 0–0.2)
BASOPHILS NFR BLD AUTO: 0 %
BILIRUB SERPL-MCNC: 0.6 MG/DL
BUN SERPL-MCNC: 22.9 MG/DL (ref 8–23)
CALCIUM SERPL-MCNC: 9.4 MG/DL (ref 8.8–10.2)
CHLORIDE SERPL-SCNC: 104 MMOL/L (ref 98–107)
CHOLEST SERPL-MCNC: 151 MG/DL
CREAT SERPL-MCNC: 0.93 MG/DL (ref 0.67–1.17)
DEPRECATED HCO3 PLAS-SCNC: 21 MMOL/L (ref 22–29)
EGFRCR SERPLBLD CKD-EPI 2021: 85 ML/MIN/1.73M2
EOSINOPHIL # BLD AUTO: 0.1 10E3/UL (ref 0–0.7)
EOSINOPHIL NFR BLD AUTO: 2 %
ERYTHROCYTE [DISTWIDTH] IN BLOOD BY AUTOMATED COUNT: 12.3 % (ref 10–15)
FASTING STATUS PATIENT QL REPORTED: YES
GLUCOSE SERPL-MCNC: 111 MG/DL (ref 70–99)
HBA1C MFR BLD: 5.1 % (ref 0–5.6)
HCT VFR BLD AUTO: 35.3 % (ref 40–53)
HDLC SERPL-MCNC: 33 MG/DL
HGB BLD-MCNC: 12.3 G/DL (ref 13.3–17.7)
IMM GRANULOCYTES # BLD: 0 10E3/UL
IMM GRANULOCYTES NFR BLD: 0 %
LDLC SERPL CALC-MCNC: 85 MG/DL
LYMPHOCYTES # BLD AUTO: 1.2 10E3/UL (ref 0.8–5.3)
LYMPHOCYTES NFR BLD AUTO: 21 %
MCH RBC QN AUTO: 32.3 PG (ref 26.5–33)
MCHC RBC AUTO-ENTMCNC: 34.8 G/DL (ref 31.5–36.5)
MCV RBC AUTO: 93 FL (ref 78–100)
MONOCYTES # BLD AUTO: 0.4 10E3/UL (ref 0–1.3)
MONOCYTES NFR BLD AUTO: 8 %
NEUTROPHILS # BLD AUTO: 3.9 10E3/UL (ref 1.6–8.3)
NEUTROPHILS NFR BLD AUTO: 69 %
NONHDLC SERPL-MCNC: 118 MG/DL
PLATELET # BLD AUTO: 134 10E3/UL (ref 150–450)
POTASSIUM SERPL-SCNC: 4.6 MMOL/L (ref 3.4–5.3)
PROT SERPL-MCNC: 6.8 G/DL (ref 6.4–8.3)
PSA SERPL DL<=0.01 NG/ML-MCNC: <0.01 NG/ML (ref 0–6.5)
RBC # BLD AUTO: 3.81 10E6/UL (ref 4.4–5.9)
SODIUM SERPL-SCNC: 136 MMOL/L (ref 135–145)
TRIGL SERPL-MCNC: 166 MG/DL
WBC # BLD AUTO: 5.6 10E3/UL (ref 4–11)

## 2023-12-26 PROCEDURE — 83036 HEMOGLOBIN GLYCOSYLATED A1C: CPT | Performed by: INTERNAL MEDICINE

## 2023-12-26 PROCEDURE — 99397 PER PM REEVAL EST PAT 65+ YR: CPT | Performed by: INTERNAL MEDICINE

## 2023-12-26 PROCEDURE — 36415 COLL VENOUS BLD VENIPUNCTURE: CPT | Performed by: INTERNAL MEDICINE

## 2023-12-26 PROCEDURE — 84153 ASSAY OF PSA TOTAL: CPT | Performed by: INTERNAL MEDICINE

## 2023-12-26 PROCEDURE — 85025 COMPLETE CBC W/AUTO DIFF WBC: CPT | Performed by: INTERNAL MEDICINE

## 2023-12-26 PROCEDURE — 99213 OFFICE O/P EST LOW 20 MIN: CPT | Mod: 25 | Performed by: INTERNAL MEDICINE

## 2023-12-26 PROCEDURE — 80053 COMPREHEN METABOLIC PANEL: CPT | Performed by: INTERNAL MEDICINE

## 2023-12-26 PROCEDURE — 80061 LIPID PANEL: CPT | Performed by: INTERNAL MEDICINE

## 2023-12-26 RX ORDER — RESPIRATORY SYNCYTIAL VIRUS VACCINE 120MCG/0.5
0.5 KIT INTRAMUSCULAR ONCE
Qty: 1 EACH | Refills: 0 | Status: CANCELLED | OUTPATIENT
Start: 2023-12-26 | End: 2023-12-26

## 2023-12-26 RX ORDER — TAMSULOSIN HYDROCHLORIDE 0.4 MG/1
0.8 CAPSULE ORAL DAILY
Qty: 180 CAPSULE | Refills: 3 | Status: SHIPPED | OUTPATIENT
Start: 2023-12-26

## 2023-12-26 RX ORDER — LISINOPRIL 10 MG/1
10 TABLET ORAL DAILY
Qty: 90 TABLET | Refills: 3 | Status: SHIPPED | OUTPATIENT
Start: 2023-12-26

## 2023-12-26 RX ORDER — CLOBETASOL PROPIONATE 0.5 MG/ML
SOLUTION TOPICAL 2 TIMES DAILY
Qty: 50 ML | Refills: 2 | Status: SHIPPED | OUTPATIENT
Start: 2023-12-26

## 2023-12-26 RX ORDER — SIMVASTATIN 40 MG
40 TABLET ORAL AT BEDTIME
Qty: 90 TABLET | Refills: 3 | Status: SHIPPED | OUTPATIENT
Start: 2023-12-26

## 2023-12-26 ASSESSMENT — ENCOUNTER SYMPTOMS
PALPITATIONS: 0
DIZZINESS: 0
FEVER: 0
ABDOMINAL PAIN: 0
JOINT SWELLING: 0
DYSURIA: 0
COUGH: 0
CONSTIPATION: 0
ARTHRALGIAS: 0
SORE THROAT: 0
SHORTNESS OF BREATH: 1
WEAKNESS: 0
DIARRHEA: 0
HEMATOCHEZIA: 0
PARESTHESIAS: 0
EYE PAIN: 0
NERVOUS/ANXIOUS: 0
HEMATURIA: 0
NAUSEA: 0
MYALGIAS: 0
CHILLS: 0
FREQUENCY: 1
HEADACHES: 0
HEARTBURN: 0

## 2023-12-26 ASSESSMENT — ACTIVITIES OF DAILY LIVING (ADL): CURRENT_FUNCTION: NO ASSISTANCE NEEDED

## 2023-12-26 NOTE — PATIENT INSTRUCTIONS
"RSV at a pharmacy.    They will call you to schedule a colonoscopy.    Lab work today:  We can do labs in the exam room today, or you can get them done downstairs in the lab.      If you are going downstairs:  Directions:  As you walk through the first floor, you'll see (on the right) first the pharmacy, then some bathrooms, then the \"Lab and Imaging\" area. Give them your name at the window there and wait for them to call you.           Patient Education   Personalized Prevention Plan  You are due for the preventive services outlined below.  Your care team is available to assist you in scheduling these services.  If you have already completed any of these items, please share that information with your care team to update in your medical record.  Health Maintenance Due   Topic Date Due    RSV VACCINE (Pregnancy & 60+) (1 - 1-dose 60+ series) Never done    ANNUAL REVIEW OF HM ORDERS  12/14/2021    Annual Wellness Visit  11/11/2023    Colorectal Cancer Screening  11/28/2023     Your Health Risk Assessment indicates you feel you are not in good health    A healthy lifestyle helps keep the body fit and the mind alert. It helps protect you from disease, helps you fight disease, and helps prevent chronic disease (disease that doesn't go away) from getting worse. This is important as you get older and begin to notice twinges in muscles and joints and a decline in the strength and stamina you once took for granted. A healthy lifestyle includes good healthcare, good nutrition, weight control, recreation, and regular exercise. Avoid harmful substances and do what you can to keep safe. Another part of a healthy lifestyle is stay mentally active and socially involved.    Good healthcare   Have a wellness visit every year.   If you have new symptoms, let us know right away. Don't wait until the next checkup.   Take medicines exactly as prescribed and keep your medicines in a safe place. Tell us if your medicine causes problems. "   Healthy diet and weight control   Eat 3 or 4 small, nutritious, low-fat, high-fiber meals a day. Include a variety of fruits, vegetables, and whole-grain foods.   Make sure you get enough calcium in your diet. Calcium, vitamin D, and exercise help prevent osteoporosis (bone thinning).   If you live alone, try eating with others when you can. That way you get a good meal and have company while you eat it.   Try to keep a healthy weight. If you eat more calories than your body uses for energy, it will be stored as fat and you will gain weight.     Recreation   Recreation is not limited to sports and team events. It includes any activity that provides relaxation, interest, enjoyment, and exercise. Recreation provides an outlet for physical, mental, and social energy. It can give a sense of worth and achievement. It can help you stay healthy.    Mental Exercise and Social Involvement  Mental and emotional health is as important as physical health. Keep in touch with friends and family. Stay as active as possible. Continue to learn and challenge yourself.   Things you can do to stay mentally active are:  Learn something new, like a foreign language or musical instrument.   Play SCRABBLE or do crossword puzzles. If you cannot find people to play these games with you at home, you can play them with others on your computer through the Internet.   Join a games club--anything from card games to chess or checkers or lawn bowling.   Start a new hobby.   Go back to school.   Volunteer.   Read.   Keep up with world events.  Learning About Dietary Guidelines  What are the Dietary Guidelines for Americans?     Dietary Guidelines for Americans provide tips for eating well and staying healthy. This helps reduce the risk for long-term (chronic) diseases.  These guidelines recommend that you:  Eat and drink the right amount for you. The U[place] government's food guide is called MyPlate. It can help you make your own well-balanced eating  "plan.  Try to balance your eating with your activity. This helps you stay at a healthy weight.  Drink alcohol in moderation, if at all.  Limit foods high in salt, saturated fat, trans fat, and added sugar.  These guidelines are from the U.S. Department of Agriculture and the U.S. Department of Health and Human Services. They are updated every 5 years.  What is MyPlate?  MyPlate is the [place] government's food guide. It can help you make your own well-balanced eating plan. A balanced eating plan means that you eat enough, but not too much, and that your food gives you the nutrients you need to stay healthy.  MyPlate focuses on eating plenty of whole grains, fruits, and vegetables, and on limiting fat and sugar. It is available online at www.ChooseMyPlate.gov.  How can you get started?  If you're trying to eat healthier, you can slowly change your eating habits over time. You don't have to make big changes all at once. Start by adding one or two healthy foods to your meals each day.  Grains  Choose whole-grain breads and cereals and whole-wheat pasta and whole-grain crackers.  Vegetables  Eat a variety of vegetables every day. They have lots of nutrients and are part of a heart-healthy diet.  Fruits  Eat a variety of fruits every day. Fruits contain lots of nutrients. Choose fresh fruit instead of fruit juice.  Protein foods  Choose fish and lean poultry more often. Eat red meat and fried meats less often. Dried beans, tofu, and nuts are also good sources of protein.  Dairy  Choose low-fat or fat-free products from this food group. If you have problems digesting milk, try eating cheese or yogurt instead.  Fats and oils  Limit fats and oils if you're trying to cut calories. Choose healthy fats when you cook. These include canola oil and olive oil.  Where can you learn more?  Go to https://www.healthwise.net/patiented  Enter D676 in the search box to learn more about \"Learning About Dietary Guidelines.\"  Current as of: " February 28, 2023               Content Version: 13.8    5711-9440 [employer].   Care instructions adapted under license by your healthcare professional. If you have questions about a medical condition or this instruction, always ask your healthcare professional. [employer] disclaims any warranty or liability for your use of this information.      Bladder Training: Care Instructions  Your Care Instructions     Bladder training is used to treat urge incontinence and stress incontinence. Urge incontinence means that the need to urinate comes on so fast that you can't get to a toilet in time. Stress incontinence means that you leak urine because of pressure on your bladder. For example, it may happen when you laugh, cough, or lift something heavy.  Bladder training can increase how long you can wait before you have to urinate. It can also help your bladder hold more urine. And it can give you better control over the urge to urinate.  It is important to remember that bladder training takes a few weeks to a few months to make a difference. You may not see results right away, but don't give up.  Follow-up care is a key part of your treatment and safety. Be sure to make and go to all appointments, and call your doctor if you are having problems. It's also a good idea to know your test results and keep a list of the medicines you take.  How can you care for yourself at home?  Work with your doctor to come up with a bladder training program that is right for you. You may use one or more of the following methods.  Delayed urination  In the beginning, try to keep from urinating for 5 minutes after you first feel the need to go.  While you wait, take deep, slow breaths to relax. Kegel exercises can also help you delay the need to go to the bathroom.  After some practice, when you can easily wait 5 minutes to urinate, try to wait 10 minutes before you urinate.  Slowly increase the waiting period until  "you are able to control when you have to urinate.  Scheduled urination  Empty your bladder when you first wake up in the morning.  Schedule times throughout the day when you will urinate.  Start by going to the bathroom every hour, even if you don't need to go.  Slowly increase the time between trips to the bathroom.  When you have found a schedule that works well for you, keep doing it.  If you wake up during the night and have to urinate, do it. Apply your schedule to waking hours only.  Kegel exercises  These tighten and strengthen pelvic muscles, which can help you control the flow of urine. (If doing these exercises causes pain, stop doing them and talk with your doctor.) To do Kegel exercises:  Squeeze your muscles as if you were trying not to pass gas. Or squeeze your muscles as if you were stopping the flow of urine. Your belly, legs, and buttocks shouldn't move.  Hold the squeeze for 3 seconds, then relax for 5 to 10 seconds.  Start with 3 seconds, then add 1 second each week until you are able to squeeze for 10 seconds.  Repeat the exercise 10 times a session. Do 3 to 8 sessions a day.  When should you call for help?  Watch closely for changes in your health, and be sure to contact your doctor if:    Your incontinence is getting worse.     You do not get better as expected.   Where can you learn more?  Go to https://www.[employer].net/patiented  Enter V684 in the search box to learn more about \"Bladder Training: Care Instructions.\"  Current as of: February 28, 2023               Content Version: 13.8    2468-0089 [employer].   Care instructions adapted under license by your healthcare professional. If you have questions about a medical condition or this instruction, always ask your healthcare professional. [employer] disclaims any warranty or liability for your use of this information.      Your Health Risk Assessment indicates you feel you are not in good emotional " health.    Recreation   Recreation is not limited to sports and team events. It includes any activity that provides relaxation, interest, enjoyment, and exercise. Recreation provides an outlet for physical, mental, and social energy. It can give a sense of worth and achievement. It can help you stay healthy.    Mental Exercise and Social Involvement  Mental and emotional health is as important as physical health. Keep in touch with friends and family. Stay as active as possible. Continue to learn and challenge yourself.   Things you can do to stay mentally active are:  Learn something new, like a foreign language or musical instrument.   Play SCRABBLE or do crossword puzzles. If you cannot find people to play these games with you at home, you can play them with others on your computer through the Internet.   Join a games club--anything from card games to chess or checkers or lawn bowling.   Start a new hobby.   Go back to school.   Volunteer.   Read.   Keep up with world events.

## 2023-12-26 NOTE — PROGRESS NOTES
"SUBJECTIVE:   Paco is a 78 year old, presenting for the following:  Physical        12/26/2023     9:39 AM   Additional Questions   Roomed by Elinor Jose CMA       Are you in the first 12 months of your Medicare coverage?  No    Healthy Habits:     In general, how would you rate your overall health?  Fair    Frequency of exercise:  4-5 days/week    Duration of exercise:  Greater than 60 minutes    Do you usually eat at least 4 servings of fruit and vegetables a day, include whole grains    & fiber and avoid regularly eating high fat or \"junk\" foods?  No    Taking medications regularly:  Yes    Medication side effects:  None    Ability to successfully perform activities of daily living:  No assistance needed    Home Safety:  No safety concerns identified    Hearing Impairment:  No hearing concerns    In the past 6 months, have you been bothered by leaking of urine? Yes    In general, how would you rate your overall mental or emotional health?  Fair    Additional concerns today:  No      Today's PHQ-2 Score:       12/26/2023     9:48 AM   PHQ-2 ( 1999 [employer])   Q1: Little interest or pleasure in doing things 0   Q2: Feeling down, depressed or hopeless 0   PHQ-2 Score 0   Q1: Little interest or pleasure in doing things Not at all   Q2: Feeling down, depressed or hopeless Not at all   PHQ-2 Score 0       Low carb diet; energy level  is lower, but is happy.     Goal weight is:  180 or less.      Have you ever done Advance Care Planning? (For example, a Health Directive, POLST, or a discussion with a medical provider or your loved ones about your wishes): No, advance care planning information given to patient to review.  Patient declined advance care planning discussion at this time.       Fall risk  Fallen 2 or more times in the past year?: No  Any fall with injury in the past year?: No    Cognitive Screening   1) Repeat 3 items (Leader, Season, Table)    2) Clock draw: NORMAL  3) 3 item recall: Recalls 2 objects "   Results: NORMAL clock, 1-2 items recalled: COGNITIVE IMPAIRMENT LESS LIKELY    Mini-CogTM Copyright KATIE Aranda. Licensed by the author for use in [facility]; reprinted with permission (fara@[facility]). All rights reserved.      Do you have sleep apnea, excessive snoring or daytime drowsiness? : no    Reviewed and updated as needed this visit by clinical staff   Tobacco  Allergies               Reviewed and updated as needed this visit by Provider                 Social History     Tobacco Use     Smoking status: Never     Passive exposure: Never     Smokeless tobacco: Never   Substance Use Topics     Alcohol use: Yes     Alcohol/week: 0.0 standard drinks of alcohol     Comment: 10-12 drinks per week             12/26/2023     9:47 AM   Alcohol Use   Prescreen: >3 drinks/day or >7 drinks/week? No     Do you have a current opioid prescription? No  Do you use any other controlled substances or medications that are not prescribed by a provider? None              Current providers sharing in care for this patient include:   Patient Care Team:  Emeka Hale MD as PCP - General  Emeka Hale MD as Assigned PCP  Jeovanny Basilio MD as MD (Urology)    The following health maintenance items are reviewed in Epic and correct as of today:  Health Maintenance   Topic Date Due     RSV VACCINE (Pregnancy & 60+) (1 - 1-dose 60+ series) Never done     ANNUAL REVIEW OF HM ORDERS  12/14/2021     MEDICARE ANNUAL WELLNESS VISIT  11/11/2023     COLORECTAL CANCER SCREENING  11/28/2023     DEXA  11/12/2024     FALL RISK ASSESSMENT  12/26/2024     DTAP/TDAP/TD IMMUNIZATION (3 - Td or Tdap) 11/08/2026     LIPID  11/11/2026     ADVANCE CARE PLANNING  11/11/2027     HEPATITIS C SCREENING  Completed     PHQ-2 (once per calendar year)  Completed     INFLUENZA VACCINE  Completed     Pneumococcal Vaccine: 65+ Years  Completed     ZOSTER IMMUNIZATION  Completed     COVID-19 Vaccine  Completed     IPV IMMUNIZATION  Aged Out     HPV  IMMUNIZATION  Aged Out     MENINGITIS IMMUNIZATION  Aged Out     RSV MONOCLONAL ANTIBODY  Aged Out     Lab work is in process  Labs reviewed in EPIC          Review of Systems   Constitutional:  Negative for chills and fever.   HENT:  Positive for hearing loss. Negative for congestion, ear pain and sore throat.    Eyes:  Negative for pain and visual disturbance.   Respiratory:  Positive for shortness of breath. Negative for cough.    Cardiovascular:  Negative for chest pain, palpitations and peripheral edema.   Gastrointestinal:  Negative for abdominal pain, constipation, diarrhea, heartburn, hematochezia and nausea.   Genitourinary:  Positive for frequency and urgency. Negative for dysuria, genital sores, hematuria, impotence and penile discharge.   Musculoskeletal:  Negative for arthralgias, joint swelling and myalgias.   Skin:  Negative for rash.   Neurological:  Negative for dizziness, weakness, headaches and paresthesias.   Psychiatric/Behavioral:  Negative for mood changes. The patient is not nervous/anxious.      CONSTITUTIONAL: NEGATIVE for fever, chills, change in weight  INTEGUMENTARY/SKIN: NEGATIVE for worrisome rashes, moles or lesions  EYES: NEGATIVE for vision changes or irritation  ENT/MOUTH: NEGATIVE for ear, mouth and throat problems  RESP: NEGATIVE for significant cough or SOB  BREAST: NEGATIVE for masses, tenderness or discharge  CV: NEGATIVE for chest pain, palpitations or peripheral edema  GI: NEGATIVE for nausea, abdominal pain, heartburn, or change in bowel habits  : NEGATIVE for frequency, dysuria, or hematuria  MUSCULOSKELETAL: NEGATIVE for significant arthralgias or myalgia  NEURO: NEGATIVE for weakness, dizziness or paresthesias  ENDOCRINE: NEGATIVE for temperature intolerance, skin/hair changes  HEME: NEGATIVE for bleeding problems  PSYCHIATRIC: NEGATIVE for changes in mood or affect    OBJECTIVE:   /62 (BP Location: Right arm, Patient Position: Sitting, Cuff Size: Adult Large)    "Pulse 80   Temp 98.6  F (37  C) (Tympanic)   Resp 14   Ht 1.664 m (5' 5.5\")   Wt 88.4 kg (194 lb 12.8 oz)   SpO2 98%   BMI 31.92 kg/m   Estimated body mass index is 31.92 kg/m  as calculated from the following:    Height as of this encounter: 1.664 m (5' 5.5\").    Weight as of this encounter: 88.4 kg (194 lb 12.8 oz).  Physical Exam  GENERAL: healthy, alert and no distress  EYES: Eyes grossly normal to inspection, PERRL and conjunctivae and sclerae normal  HENT: ear canals and TM's normal, nose and mouth without ulcers or lesions  NECK: no adenopathy, no asymmetry, masses, or scars and thyroid normal to palpation  RESP: lungs clear to auscultation - no rales, rhonchi or wheezes  CV: regular rate and rhythm, normal S1 S2, no S3 or S4, no murmur, click or rub, no peripheral edema and peripheral pulses strong  ABDOMEN: soft, nontender, no hepatosplenomegaly, no masses and bowel sounds normal  MS: no gross musculoskeletal defects noted, no edema  SKIN: no suspicious lesions or rashes  NEURO: Normal strength and tone, mentation intact and speech normal  PSYCH: mentation appears normal, affect normal/bright    Diagnostic Test Results:  Labs reviewed in Epic    ASSESSMENT / PLAN:   (Z00.00) Encounter for Medicare annual wellness exam  (primary encounter diagnosis)  Comment:   Plan: OFFICE/OUTPT VISIT,EST,LEVL III        Discussed diet, exercise, testicular self exam, blood pressure, cholesterol, and need for cancer surveillance at appropriate ages.     (Z29.11) Need for vaccination against respiratory syncytial virus  Comment:   Plan:     (Z12.11) Screen for colon cancer  Comment:   Plan: Colonoscopy Screening  Referral            (R73.01) Impaired fasting glucose  Comment:   Plan: Hemoglobin A1c, OFFICE/OUTPT VISIT,EST,LEVL III        Labs today.  Congratulated on weight loss.     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     (C61) Malignant neoplasm of prostate (H)  Comment:   Plan: PSA, tumor marker, OFFICE/OUTPT VISIT,EST,LEVL         III        Surveillance prostate specific antigen (PSA).     (I10) Hypertension goal BP (blood pressure) < 140/90  Comment:   Plan: Comprehensive metabolic panel (BMP + Alb, Alk         Phos, ALT, AST, Total. Bili, TP), Lipid panel         reflex to direct LDL Fasting, OFFICE/OUTPT         VISIT,EST,LEVL III        At goal.  No changes.     (M17.11) Primary osteoarthritis of right knee  Comment:   Plan: OFFICE/OUTPT VISIT,EST,LEVL III        Management per [facility].      (I10) Benign essential hypertension  Comment:   Plan: lisinopril (ZESTRIL) 10 MG tablet, OFFICE/OUTPT        VISIT,EST,LEVL III        At goal.     (E78.5) Hyperlipidemia LDL goal <130  Comment:   Plan: simvastatin (ZOCOR) 40 MG tablet, OFFICE/OUTPT         VISIT,EST,LEVL III        Tolerating well.     (R35.1) Nocturia  Comment:   Plan: tamsulosin (FLOMAX) 0.4 MG capsule            (L40.8) Other psoriasis  Comment:   Plan: clobetasol (TEMOVATE) 0.05 % external solution,        OFFICE/OUTPT VISIT,EST,LEVL III        Refilled scalp solution.     Patient has been advised of split billing requirements and indicates understanding: Yes      COUNSELING:  Reviewed preventive health counseling, as reflected in patient instructions       Regular exercise       Healthy diet/nutrition       Vision screening       Colon cancer screening       Prostate cancer screening      BMI:   Estimated body mass index is 31.92 kg/m  as calculated from the following:    Height as of this encounter: 1.664 m (5' 5.5\").    Weight as of this encounter: 88.4 kg (194 lb 12.8 oz).   Weight management plan: Discussed healthy diet and exercise guidelines      He reports that he has never smoked. He has never been exposed to tobacco smoke. He has never used smokeless tobacco.      Appropriate preventive services were discussed with this patient, including applicable " screening as appropriate for fall prevention, nutrition, physical activity, Tobacco-use cessation, weight loss and cognition.  Checklist reviewing preventive services available has been given to the patient.    Reviewed patients plan of care and provided an AVS. The Basic Care Plan (routine screening as documented in Health Maintenance) for Paco meets the Care Plan requirement. This Care Plan has been established and reviewed with the Patient.          Emeka Hale MD  [facility]    Identified Health Risks:  I have reviewed Opioid Use Disorder and Substance Use Disorder risk factors and made any needed referrals.   The patient was provided with suggestions to help him develop a healthy physical lifestyle.  The patient was counseled and encouraged to consider modifying their diet and eating habits. He was provided with information on recommended healthy diet options.  Information on urinary incontinence and treatment options given to patient.  The patient was provided with suggestions to help him develop a healthy emotional lifestyle.

## 2023-12-27 ENCOUNTER — TELEPHONE (OUTPATIENT)
Dept: GASTROENTEROLOGY | Facility: CLINIC | Age: 78
End: 2023-12-27
Payer: COMMERCIAL

## 2024-01-03 ENCOUNTER — MYC REFILL (OUTPATIENT)
Dept: PEDIATRICS | Facility: CLINIC | Age: 79
End: 2024-01-03
Payer: COMMERCIAL

## 2024-01-03 DIAGNOSIS — I10 BENIGN ESSENTIAL HYPERTENSION: ICD-10-CM

## 2024-01-03 DIAGNOSIS — E78.5 HYPERLIPIDEMIA LDL GOAL <130: ICD-10-CM

## 2024-01-03 RX ORDER — LISINOPRIL 10 MG/1
10 TABLET ORAL DAILY
Qty: 90 TABLET | Refills: 3 | OUTPATIENT
Start: 2024-01-03

## 2024-01-03 RX ORDER — SIMVASTATIN 40 MG
40 TABLET ORAL AT BEDTIME
Qty: 90 TABLET | Refills: 3 | OUTPATIENT
Start: 2024-01-03

## 2024-04-24 ENCOUNTER — TRANSFERRED RECORDS (OUTPATIENT)
Dept: HEALTH INFORMATION MANAGEMENT | Facility: CLINIC | Age: 79
End: 2024-04-24
Payer: COMMERCIAL

## 2024-06-20 ENCOUNTER — LAB REQUISITION (OUTPATIENT)
Dept: LAB | Facility: CLINIC | Age: 79
End: 2024-06-20
Payer: COMMERCIAL

## 2024-06-20 DIAGNOSIS — Z12.11 ENCOUNTER FOR SCREENING FOR MALIGNANT NEOPLASM OF COLON: ICD-10-CM

## 2024-06-20 PROCEDURE — 88305 TISSUE EXAM BY PATHOLOGIST: CPT | Mod: TC,ORL | Performed by: COLON & RECTAL SURGERY

## 2024-06-20 PROCEDURE — 88305 TISSUE EXAM BY PATHOLOGIST: CPT | Mod: 26 | Performed by: PATHOLOGY

## 2024-06-24 LAB
PATH REPORT.COMMENTS IMP SPEC: NORMAL
PATH REPORT.COMMENTS IMP SPEC: NORMAL
PATH REPORT.FINAL DX SPEC: NORMAL
PATH REPORT.GROSS SPEC: NORMAL
PATH REPORT.MICROSCOPIC SPEC OTHER STN: NORMAL
PATH REPORT.RELEVANT HX SPEC: NORMAL
PHOTO IMAGE: NORMAL

## 2024-10-31 DIAGNOSIS — L40.8 OTHER PSORIASIS: ICD-10-CM

## 2024-10-31 RX ORDER — CLOBETASOL PROPIONATE 0.5 MG/ML
SOLUTION TOPICAL
Qty: 50 ML | Refills: 2 | Status: SHIPPED | OUTPATIENT
Start: 2024-10-31

## 2024-11-12 ENCOUNTER — MEDICAL CORRESPONDENCE (OUTPATIENT)
Dept: HEALTH INFORMATION MANAGEMENT | Facility: CLINIC | Age: 79
End: 2024-11-12
Payer: COMMERCIAL

## 2025-01-13 ENCOUNTER — OFFICE VISIT (OUTPATIENT)
Dept: PEDIATRICS | Facility: CLINIC | Age: 80
End: 2025-01-13
Attending: INTERNAL MEDICINE
Payer: COMMERCIAL

## 2025-01-13 VITALS
OXYGEN SATURATION: 99 % | DIASTOLIC BLOOD PRESSURE: 72 MMHG | RESPIRATION RATE: 18 BRPM | SYSTOLIC BLOOD PRESSURE: 126 MMHG | TEMPERATURE: 98 F | WEIGHT: 192.6 LBS | BODY MASS INDEX: 30.95 KG/M2 | HEART RATE: 93 BPM | HEIGHT: 66 IN

## 2025-01-13 DIAGNOSIS — C61 MALIGNANT NEOPLASM OF PROSTATE (H): ICD-10-CM

## 2025-01-13 DIAGNOSIS — J01.01 ACUTE RECURRENT MAXILLARY SINUSITIS: ICD-10-CM

## 2025-01-13 DIAGNOSIS — I10 HYPERTENSION GOAL BP (BLOOD PRESSURE) < 140/90: ICD-10-CM

## 2025-01-13 DIAGNOSIS — Z93.3 PRESENCE OF COLOSTOMY (H): ICD-10-CM

## 2025-01-13 DIAGNOSIS — E78.5 HYPERLIPIDEMIA LDL GOAL <130: ICD-10-CM

## 2025-01-13 DIAGNOSIS — Z00.00 ENCOUNTER FOR MEDICARE ANNUAL WELLNESS EXAM: Primary | ICD-10-CM

## 2025-01-13 DIAGNOSIS — I10 BENIGN ESSENTIAL HYPERTENSION: ICD-10-CM

## 2025-01-13 DIAGNOSIS — M81.0 OSTEOPOROSIS WITHOUT CURRENT PATHOLOGICAL FRACTURE, UNSPECIFIED OSTEOPOROSIS TYPE: ICD-10-CM

## 2025-01-13 DIAGNOSIS — Z12.11 SCREEN FOR COLON CANCER: ICD-10-CM

## 2025-01-13 DIAGNOSIS — R32 URINARY INCONTINENCE, UNSPECIFIED TYPE: ICD-10-CM

## 2025-01-13 LAB
EST. AVERAGE GLUCOSE BLD GHB EST-MCNC: 105 MG/DL
HBA1C MFR BLD: 5.3 % (ref 0–5.6)

## 2025-01-13 PROCEDURE — 90662 IIV NO PRSV INCREASED AG IM: CPT | Performed by: INTERNAL MEDICINE

## 2025-01-13 PROCEDURE — G0008 ADMIN INFLUENZA VIRUS VAC: HCPCS | Performed by: INTERNAL MEDICINE

## 2025-01-13 PROCEDURE — 80053 COMPREHEN METABOLIC PANEL: CPT | Performed by: INTERNAL MEDICINE

## 2025-01-13 PROCEDURE — 99214 OFFICE O/P EST MOD 30 MIN: CPT | Mod: 25 | Performed by: INTERNAL MEDICINE

## 2025-01-13 PROCEDURE — G0439 PPPS, SUBSEQ VISIT: HCPCS | Performed by: INTERNAL MEDICINE

## 2025-01-13 PROCEDURE — 80061 LIPID PANEL: CPT | Performed by: INTERNAL MEDICINE

## 2025-01-13 PROCEDURE — 83036 HEMOGLOBIN GLYCOSYLATED A1C: CPT | Mod: GZ | Performed by: INTERNAL MEDICINE

## 2025-01-13 PROCEDURE — 91320 SARSCV2 VAC 30MCG TRS-SUC IM: CPT | Performed by: INTERNAL MEDICINE

## 2025-01-13 PROCEDURE — 84153 ASSAY OF PSA TOTAL: CPT | Performed by: INTERNAL MEDICINE

## 2025-01-13 PROCEDURE — 90480 ADMN SARSCOV2 VAC 1/ONLY CMP: CPT | Performed by: INTERNAL MEDICINE

## 2025-01-13 PROCEDURE — 36415 COLL VENOUS BLD VENIPUNCTURE: CPT | Performed by: INTERNAL MEDICINE

## 2025-01-13 RX ORDER — SIMVASTATIN 40 MG
40 TABLET ORAL AT BEDTIME
Qty: 90 TABLET | Refills: 3 | Status: SHIPPED | OUTPATIENT
Start: 2025-01-13

## 2025-01-13 RX ORDER — AMOXICILLIN 500 MG/1
1000 CAPSULE ORAL 2 TIMES DAILY
Qty: 40 CAPSULE | Refills: 0 | Status: SHIPPED | OUTPATIENT
Start: 2025-01-13 | End: 2025-01-23

## 2025-01-13 RX ORDER — LISINOPRIL 10 MG/1
10 TABLET ORAL DAILY
Qty: 90 TABLET | Refills: 3 | Status: SHIPPED | OUTPATIENT
Start: 2025-01-13

## 2025-01-13 SDOH — HEALTH STABILITY: PHYSICAL HEALTH: ON AVERAGE, HOW MANY DAYS PER WEEK DO YOU ENGAGE IN MODERATE TO STRENUOUS EXERCISE (LIKE A BRISK WALK)?: 3 DAYS

## 2025-01-13 ASSESSMENT — SOCIAL DETERMINANTS OF HEALTH (SDOH): HOW OFTEN DO YOU GET TOGETHER WITH FRIENDS OR RELATIVES?: ONCE A WEEK

## 2025-01-13 NOTE — PROGRESS NOTES
"Preventive Care Visit  [facility] VIVIAN Hale MD, Internal Medicine - Pediatrics  Jan 13, 2025      Assessment & Plan     Screen for colon cancer  Per gastroenterology/ colorectal.     Osteoporosis without current pathological fracture, unspecified osteoporosis type  Due.  Ordered. Renew fosamax if any worsening; has been off for 12 years now.   - DX Bone Density; Future    Hypertension goal BP (blood pressure) < 140/90  At goal.  Refilled.     Presence of colostomy (H)  Stable.     Malignant neoplasm of prostate (H)  Given his new urinary leakage, he'd like to see urology, mari with his prostate history.   - Adult Urology  Referral; Future  - PSA, tumor marker; Future    Encounter for Medicare annual wellness exam  Discussed diet, exercise, testicular self exam, blood pressure, cholesterol, and need for cancer surveillance at appropriate ages.    - Hemoglobin A1c; Future  - Lipid panel reflex to direct LDL Fasting; Future  - Comprehensive metabolic panel (BMP + Alb, Alk Phos, ALT, AST, Total. Bili, TP); Future    Urinary incontinence, unspecified type  As above.   - Adult Urology  Referral; Future    Hyperlipidemia LDL goal <130  Ongoing statin.  Tolerating well.   - simvastatin (ZOCOR) 40 MG tablet; Take 1 tablet (40 mg) by mouth at bedtime.    Benign essential hypertension  At goal.   - lisinopril (ZESTRIL) 10 MG tablet; Take 1 tablet (10 mg) by mouth daily.    Acute recurrent maxillary sinusitis  Saline.  Claritin, flonase and 10 days of amox.   - amoxicillin (AMOXIL) 500 MG capsule; Take 2 capsules (1,000 mg) by mouth 2 times daily for 10 days.    Patient has been advised of split billing requirements and indicates understanding: Yes        BMI  Estimated body mass index is 31.56 kg/m  as calculated from the following:    Height as of this encounter: 1.664 m (5' 5.5\").    Weight as of this encounter: 87.4 kg (192 lb 9.6 oz).   Weight management plan: Discussed healthy " diet and exercise guidelines    Counseling  Appropriate preventive services were addressed with this patient via screening, questionnaire, or discussion as appropriate for fall prevention, nutrition, physical activity, Tobacco-use cessation, social engagement, weight loss and cognition.  Checklist reviewing preventive services available has been given to the patient.  Reviewed patient's diet, addressing concerns and/or questions.   He is at risk for lack of exercise and has been provided with information to increase physical activity for the benefit of his well-being.   The patient was instructed to see the dentist every 6 months.   Discussed possible causes of fatigue. The patient reports drinking more than 3 alcoholic drinks per day and/or more than 7 drhnks per week. The patient was counseled and given information about possible harmful effects of excessive alcohol intake.The patient was provided with written information regarding signs of hearing loss.   Information on urinary incontinence and treatment options given to patient.       See Patient Instructions    Toñito Antonio is a 79 year old, presenting for the following:  Annual Visit  Nasal congestion for the past month  Urine leakage      1/13/2025     2:58 PM   Additional Questions   Roomed by cris   Accompanied by sonia         1/13/2025     2:58 PM   Patient Reported Additional Medications   Patient reports taking the following new medications na           HPI  Sinus pain and congestion over 1 month. Has been taking claritin, but not helping.     Has urinary leakage when he gets steroid shots in his lumbar spine.  Was suggested to see a urologist last year, but never.   Wakes up 3 times per night.   Flomax did not help.     Had colonoscopy done months ago.  Polyps. Found hyperplastic recommended a 5 year follow up per patient due to rectal cancer.               Health Care Directive  Patient does not have a Health Care Directive: Discussed advance  care planning with patient; information given to patient to review.      1/13/2025   General Health   How would you rate your overall physical health? Good   Feel stress (tense, anxious, or unable to sleep) Only a little   (!) STRESS CONCERN      1/13/2025   Nutrition   Diet: Regular (no restrictions)         1/13/2025   Exercise   Days per week of moderate/strenous exercise 3 days         1/13/2025   Social Factors   Frequency of gathering with friends or relatives Once a week   Worry food won't last until get money to buy more No   Food not last or not have enough money for food? No   Do you have housing? (Housing is defined as stable permanent housing and does not include staying ouside in a car, in a tent, in an abandoned building, in an overnight shelter, or couch-surfing.) No   Are you worried about losing your housing? No   Lack of transportation? No   Unable to get utilities (heat,electricity)? No   Want help with housing or utility concern? No   (!) HOUSING CONCERN PRESENT      1/13/2025   Fall Risk   Fallen 2 or more times in the past year? No    No   Trouble with walking or balance? No    No       Multiple values from one day are sorted in reverse-chronological order          1/13/2025   Activities of Daily Living- Home Safety   Needs help with the following daily activites None of the above   Safety concerns in the home None of the above         1/13/2025   Dental   Dentist two times every year? (!) NO         1/13/2025   Hearing Screening   Hearing concerns? (!) IT'S HARDER TO UNDERSTAND WOMEN'S VOICES THAN MEN'S VOICES.         1/13/2025   Driving Risk Screening   Patient/family members have concerns about driving No         1/13/2025   General Alertness/Fatigue Screening   Have you been more tired than usual lately? (!) YES         1/13/2025   Urinary Incontinence Screening   Bothered by leaking urine in past 6 months Yes         1/13/2025   TB Screening   Were you born outside of the [place]? No          Today's PHQ-2 Score:       1/13/2025     3:05 PM   PHQ-2 ( 1999 [employer])   Q1: Little interest or pleasure in doing things 0   Q2: Feeling down, depressed or hopeless 0   PHQ-2 Score 0    Q1: Little interest or pleasure in doing things Not at all   Q2: Feeling down, depressed or hopeless Not at all   PHQ-2 Score 0       Patient-reported           1/13/2025   Substance Use   Alcohol more than 3/day or more than 7/wk Yes   How often do you have a drink containing alcohol 4 or more times a week   How many alcohol drinks on typical day 1 or 2   How often do you have 5+ drinks at one occasion Weekly   Audit 2/3 Score 3   How often not able to stop drinking once started Less than monthly   How often failed to do what normally expected Never   How often needed first drink in am after a heavy drinking session Never   How often feeling of guilt or remorse after drinking Never   How often unable to remember what happened the night before Never   Have you or someone else been injured because of your drinking No   Has anyone been concerned or suggested you cut down on drinking No   TOTAL SCORE - AUDIT 8   Do you have a current opioid prescription? No   How severe/bad is pain from 1 to 10? 0/10 (No Pain)   Do you use any other substances recreationally? No     Social History     Tobacco Use    Smoking status: Never     Passive exposure: Never    Smokeless tobacco: Never   Vaping Use    Vaping status: Never Used   Substance Use Topics    Alcohol use: Yes     Alcohol/week: 0.0 standard drinks of alcohol     Comment: 10-12 drinks per week    Drug use: No       ASCVD Risk   The 10-year ASCVD risk score (Mindy ROGEL, et al., 2019) is: 48.4%    Values used to calculate the score:      Age: 79 years      Sex: Male      Is Non- : No      Diabetic: No      Tobacco smoker: No      Systolic Blood Pressure: 163 mmHg      Is BP treated: Yes      HDL Cholesterol: 33 mg/dL      Total Cholesterol: 151  mg/dL            Reviewed and updated as needed this visit by Provider                    Past Medical History:   Diagnosis Date    Fatty liver 2/9/2011    Gouty arthropathy     Hypercholesteremia 12/15/2009    Hypertension     Impaired fasting glucose 7/26/2010    Leukopenia 11/7/2008    WBC 3.6 in 11/08.    Malignant neoplasm of prostate (H) 2000    on casodex and Lupron    Osteoporosis, unspecified     assoc with Lupron    Other psoriasis      Past Surgical History:   Procedure Laterality Date    APPLY WOUND VAC N/A 10/21/2014    Procedure: APPLY WOUND VAC;  Surgeon: Mir Hernandez MD;  Location:  OR    COLONOSCOPY N/A 10/10/2014    Procedure: COMBINED COLONOSCOPY, SINGLE BIOPSY/POLYPECTOMY BY BIOPSY;  Surgeon: Mir Hernandez MD;  Location:  GI    COLOSTOMY N/A 10/21/2014    Procedure: COLOSTOMY;  Surgeon: Mir Hernandez MD;  Location:  OR    ORTHOPEDIC SURGERY Bilateral 2000 ?    rotater repair both    ORTHOPEDIC SURGERY Right     ankle     RESECTION ABDOMINAL PERINEAL N/A 10/21/2014    Procedure: RESECTION ABDOMINAL PERINEAL;  Surgeon: Mir Hernandez MD;  Location:  OR     Current providers sharing in care for this patient include:  Patient Care Team:  Emeka Hale MD as PCP - General  Emeka Hale MD as Assigned PCP  Jeovanny Basilio MD as MD (Urology)    The following health maintenance items are reviewed in Epic and correct as of today:  Health Maintenance   Topic Date Due    RSV VACCINE (1 - 1-dose 75+ series) Never done    ANNUAL REVIEW OF HM ORDERS  12/14/2021    COLORECTAL CANCER SCREENING  11/28/2023    INFLUENZA VACCINE (1) 09/01/2024    COVID-19 Vaccine (6 - 2024-25 season) 09/01/2024    DEXA  11/12/2024    BMP  12/26/2024    MEDICARE ANNUAL WELLNESS VISIT  12/26/2024    FALL RISK ASSESSMENT  01/13/2026    DTAP/TDAP/TD IMMUNIZATION (3 - Td or Tdap) 11/08/2026    GLUCOSE  12/26/2026    LIPID  12/26/2028    ADVANCE CARE PLANNING  12/26/2028    HEPATITIS C SCREENING  Completed     "PHQ-2 (once per calendar year)  Completed    Pneumococcal Vaccine: 50+ Years  Completed    ZOSTER IMMUNIZATION  Completed    HPV IMMUNIZATION  Aged Out    MENINGITIS IMMUNIZATION  Aged Out    RSV MONOCLONAL ANTIBODY  Aged Out         Review of Systems  Constitutional, HEENT, cardiovascular, pulmonary, GI, , musculoskeletal, neuro, skin, endocrine and psych systems are negative, except as otherwise noted.     Objective    Exam  BP (!) 163/78 (BP Location: Right arm, Patient Position: Sitting, Cuff Size: Adult Large)   Pulse 93   Temp 98  F (36.7  C) (Tympanic)   Resp 18   Ht 1.664 m (5' 5.5\")   Wt 87.4 kg (192 lb 9.6 oz)   SpO2 99%   BMI 31.56 kg/m     Estimated body mass index is 31.56 kg/m  as calculated from the following:    Height as of this encounter: 1.664 m (5' 5.5\").    Weight as of this encounter: 87.4 kg (192 lb 9.6 oz).    Physical Exam  GENERAL: alert and no distress  EYES: Eyes grossly normal to inspection, PERRL and conjunctivae and sclerae normal  HENT: ear canals and TM's normal, nose and mouth without ulcers or lesions  NECK: no adenopathy, no asymmetry, masses, or scars  RESP: lungs clear to auscultation - no rales, rhonchi or wheezes  CV: regular rate and rhythm, normal S1 S2, no S3 or S4, no murmur, click or rub, no peripheral edema  ABDOMEN: soft, nontender, no hepatosplenomegaly, no masses and bowel sounds normal   (male): normal male genitalia without lesions or urethral discharge, no hernia  MS: no gross musculoskeletal defects noted, no edema  SKIN: no suspicious lesions or rashes  NEURO: Normal strength and tone, mentation intact and speech normal  PSYCH: mentation appears normal, affect normal/bright         1/13/2025   Mini Cog   Clock Draw Score 2 Normal   3 Item Recall 3 objects recalled   Mini Cog Total Score 5              Signed Electronically by: Emeka Hale MD    "

## 2025-01-13 NOTE — PATIENT INSTRUCTIONS
RSV at a pharmacy.    Call for a urology appointment.    Lab work today:  We can do labs in the exam room today, or you can get them done downstairs in the lab.      Flu and COVID shot today.    Schedule your DEXA scan (bone density test for osteoporosis) for when you return.      Patient Education   Preventive Care Advice   This is general advice given by our system to help you stay healthy. However, your care team may have specific advice just for you. Please talk to your care team about your preventive care needs.  Nutrition  Eat 5 or more servings of fruits and vegetables each day.  Try wheat bread, brown rice and whole grain pasta (instead of white bread, rice, and pasta).  Get enough calcium and vitamin D. Check the label on foods and aim for 100% of the RDA (recommended daily allowance).  Lifestyle  Exercise at least 150 minutes each week  (30 minutes a day, 5 days a week).  Do muscle strengthening activities 2 days a week. These help control your weight and prevent disease.  No smoking.  Wear sunscreen to prevent skin cancer.  Have a dental exam and cleaning every 6 months.  Yearly exams  See your health care team every year to talk about:  Any changes in your health.  Any medicines your care team has prescribed.  Preventive care, family planning, and ways to prevent chronic diseases.  Shots (vaccines)   HPV shots (up to age 26), if you've never had them before.  Hepatitis B shots (up to age 59), if you've never had them before.  COVID-19 shot: Get this shot when it's due.  Flu shot: Get a flu shot every year.  Tetanus shot: Get a tetanus shot every 10 years.  Pneumococcal, hepatitis A, and RSV shots: Ask your care team if you need these based on your risk.  Shingles shot (for age 50 and up)  General health tests  Diabetes screening:  Starting at age 35, Get screened for diabetes at least every 3 years.  If you are younger than age 35, ask your care team if you should be screened for diabetes.  Cholesterol  test: At age 39, start having a cholesterol test every 5 years, or more often if advised.  Bone density scan (DEXA): At age 50, ask your care team if you should have this scan for osteoporosis (brittle bones).  Hepatitis C: Get tested at least once in your life.  STIs (sexually transmitted infections)  Before age 24: Ask your care team if you should be screened for STIs.  After age 24: Get screened for STIs if you're at risk. You are at risk for STIs (including HIV) if:  You are sexually active with more than one person.  You don't use condoms every time.  You or a partner was diagnosed with a sexually transmitted infection.  If you are at risk for HIV, ask about PrEP medicine to prevent HIV.  Get tested for HIV at least once in your life, whether you are at risk for HIV or not.  Cancer screening tests  Cervical cancer screening: If you have a cervix, begin getting regular cervical cancer screening tests starting at age 21.  Breast cancer scan (mammogram): If you've ever had breasts, begin having regular mammograms starting at age 40. This is a scan to check for breast cancer.  Colon cancer screening: It is important to start screening for colon cancer at age 45.  Have a colonoscopy test every 10 years (or more often if you're at risk) Or, ask your provider about stool tests like a FIT test every year or Cologuard test every 3 years.  To learn more about your testing options, visit:   .  For help making a decision, visit:   https://bit.ly/ix61645.  Prostate cancer screening test: If you have a prostate, ask your care team if a prostate cancer screening test (PSA) at age 55 is right for you.  Lung cancer screening: If you are a current or former smoker ages 50 to 80, ask your care team if ongoing lung cancer screenings are right for you.  For informational purposes only. Not to replace the advice of your health care provider. Copyright   2023 [employer]. All rights reserved. Clinically reviewed by the [facility]  [facility]. [employer] 668402 - REV 01/24.  Hearing Loss: Care Instructions  Overview     Hearing loss is a sudden or slow decrease in how well you hear. It can range from slight to profound. Permanent hearing loss can occur with aging. It also can happen when you are exposed long-term to loud noise. Examples include listening to loud music, riding motorcycles, or being around other loud machines.  Hearing loss can affect your work and home life. It can make you feel lonely or depressed. You may feel that you have lost your independence. But hearing aids and other devices can help you hear better and feel connected to others.  Follow-up care is a key part of your treatment and safety. Be sure to make and go to all appointments, and call your doctor if you are having problems. It's also a good idea to know your test results and keep a list of the medicines you take.  How can you care for yourself at home?  Avoid loud noises whenever possible. This helps keep your hearing from getting worse.  Always wear hearing protection around loud noises.  Wear a hearing aid as directed.  A professional can help you pick a hearing aid that will work best for you.  You can also get hearing aids over the counter for mild to moderate hearing loss.  Have hearing tests as your doctor suggests. They can show whether your hearing has changed. Your hearing aid may need to be adjusted.  Use other devices as needed. These may include:  Telephone amplifiers and hearing aids that can connect to a television, stereo, radio, or microphone.  Devices that use lights or vibrations. These alert you to the doorbell, a ringing telephone, or a baby monitor.  Television closed-captioning. This shows the words at the bottom of the screen. Most new TVs can do this.  TTY (text telephone). This lets you type messages back and forth on the telephone instead of talking or listening. These devices are also called TDD. When messages  "are typed on the keyboard, they are sent over the phone line to a receiving TTY. The message is shown on a monitor.  Use text messaging, social media, and email if it is hard for you to communicate by telephone.  Try to learn a listening technique called speechreading. It is not lipreading. You pay attention to people's gestures, expressions, posture, and tone of voice. These clues can help you understand what a person is saying. Face the person you are talking to, and have them face you. Make sure the lighting is good. You need to see the other person's face clearly.  Think about counseling if you need help to adjust to your hearing loss.  When should you call for help?  Watch closely for changes in your health, and be sure to contact your doctor if:    You think your hearing is getting worse.     You have new symptoms, such as dizziness or nausea.   Where can you learn more?  Go to https://www.[employer].Eyesquad/patiented  Enter R798 in the search box to learn more about \"Hearing Loss: Care Instructions.\"  Current as of: September 27, 2023  Content Version: 14.3    2024 [employer].   Care instructions adapted under license by your healthcare professional. If you have questions about a medical condition or this instruction, always ask your healthcare professional. [employer] disclaims any warranty or liability for your use of this information.    Learning About Sleeping Well  What does sleeping well mean?     Sleeping well means getting enough sleep to feel good and stay healthy. How much sleep is enough varies among people.  The number of hours you sleep and how you feel when you wake up are both important. If you do not feel refreshed, you probably need more sleep. Another sign of not getting enough sleep is feeling tired during the day.  Experts recommend that adults get at least 7 or more hours of sleep per day. Children and older adults need more sleep.  Why is getting enough sleep " "important?  Getting enough quality sleep is a basic part of good health. When your sleep suffers, your physical health, mood, and your thoughts can suffer too. You may find yourself feeling more grumpy or stressed. Not getting enough sleep also can lead to serious problems, including injury, accidents, anxiety, and depression.  What might cause poor sleeping?  Many things can cause sleep problems, including:  Changes to your sleep schedule.  Stress. Stress can be caused by fear about a single event, such as giving a speech. Or you may have ongoing stress, such as worry about work or school.  Depression, anxiety, and other mental or emotional conditions.  Changes in your sleep habits or surroundings. This includes changes that happen where you sleep, such as noise, light, or sleeping in a different bed. It also includes changes in your sleep pattern, such as having jet lag or working a late shift.  Health problems, such as pain, breathing problems, and restless legs syndrome.  Lack of regular exercise.  Using alcohol, nicotine, or caffeine before bed.  How can you help yourself?  Here are some tips that may help you sleep more soundly and wake up feeling more refreshed.  Your sleeping area   Use your bedroom only for sleeping and sex. A bit of light reading may help you fall asleep. But if it doesn't, do your reading elsewhere in the house. Try not to use your TV, computer, smartphone, or tablet while you are in bed.  Be sure your bed is big enough to stretch out comfortably, especially if you have a sleep partner.  Keep your bedroom quiet, dark, and cool. Use curtains, blinds, or a sleep mask to block out light. To block out noise, use earplugs, soothing music, or a \"white noise\" machine.  Your evening and bedtime routine   Create a relaxing bedtime routine. You might want to take a warm shower or bath, or listen to soothing music.  Go to bed at the same time every night. And get up at the same time every morning, " "even if you feel tired.  What to avoid   Limit caffeine (coffee, tea, caffeinated sodas) during the day, and don't have any for at least 6 hours before bedtime.  Avoid drinking alcohol before bedtime. Alcohol can cause you to wake up more often during the night.  Try not to smoke or use tobacco, especially in the evening. Nicotine can keep you awake.  Limit naps during the day, especially close to bedtime.  Avoid lying in bed awake for too long. If you can't fall asleep or if you wake up in the middle of the night and can't get back to sleep within about 20 minutes, get out of bed and go to another room until you feel sleepy.  Avoid taking medicine right before bed that may keep you awake or make you feel hyper or energized. Your doctor can tell you if your medicine may do this and if you can take it earlier in the day.  If you can't sleep   Imagine yourself in a peaceful, pleasant scene. Focus on the details and feelings of being in a place that is relaxing.  Get up and do a quiet or boring activity until you feel sleepy.  Avoid drinking any liquids before going to bed to help prevent waking up often to use the bathroom.  Where can you learn more?  Go to https://www.ShelfX.net/patiented  Enter J942 in the search box to learn more about \"Learning About Sleeping Well.\"  Current as of: July 31, 2024  Content Version: 14.3    2024 [employer].   Care instructions adapted under license by your healthcare professional. If you have questions about a medical condition or this instruction, always ask your healthcare professional. [employer] disclaims any warranty or liability for your use of this information.    Bladder Training: Care Instructions  Your Care Instructions     Bladder training is used to treat urge incontinence and stress incontinence. Urge incontinence means that the need to urinate comes on so fast that you can't get to a toilet in time. Stress incontinence means that you leak " urine because of pressure on your bladder. For example, it may happen when you laugh, cough, or lift something heavy.  Bladder training can increase how long you can wait before you have to urinate. It can also help your bladder hold more urine. And it can give you better control over the urge to urinate.  It is important to remember that bladder training takes a few weeks to a few months to make a difference. You may not see results right away, but don't give up.  Follow-up care is a key part of your treatment and safety. Be sure to make and go to all appointments, and call your doctor if you are having problems. It's also a good idea to know your test results and keep a list of the medicines you take.  How can you care for yourself at home?  Work with your doctor to come up with a bladder training program that is right for you. You may use one or more of the following methods.  Delayed urination  In the beginning, try to keep from urinating for 5 minutes after you first feel the need to go.  While you wait, take deep, slow breaths to relax. Kegel exercises can also help you delay the need to go to the bathroom.  After some practice, when you can easily wait 5 minutes to urinate, try to wait 10 minutes before you urinate.  Slowly increase the waiting period until you are able to control when you have to urinate.  Scheduled urination  Empty your bladder when you first wake up in the morning.  Schedule times throughout the day when you will urinate.  Start by going to the bathroom every hour, even if you don't need to go.  Slowly increase the time between trips to the bathroom.  When you have found a schedule that works well for you, keep doing it.  If you wake up during the night and have to urinate, do it. Apply your schedule to waking hours only.  Kegel exercises  These tighten and strengthen pelvic muscles, which can help you control the flow of urine. (If doing these exercises causes pain, stop doing them and  "talk with your doctor.) To do Kegel exercises:  Squeeze your muscles as if you were trying not to pass gas. Or squeeze your muscles as if you were stopping the flow of urine. Your belly, legs, and buttocks shouldn't move.  Hold the squeeze for 3 seconds, then relax for 5 to 10 seconds.  Start with 3 seconds, then add 1 second each week until you are able to squeeze for 10 seconds.  Repeat the exercise 10 times a session. Do 3 to 8 sessions a day.  When should you call for help?  Watch closely for changes in your health, and be sure to contact your doctor if:    Your incontinence is getting worse.     You do not get better as expected.   Where can you learn more?  Go to https://www.[employer].Curse/patiented  Enter V684 in the search box to learn more about \"Bladder Training: Care Instructions.\"  Current as of: April 30, 2024  Content Version: 14.3    2024 [employer].   Care instructions adapted under license by your healthcare professional. If you have questions about a medical condition or this instruction, always ask your healthcare professional. [employer] disclaims any warranty or liability for your use of this information.    9 Ways to Cut Back on Drinking  Maybe you've found yourself drinking more alcohol than you'd prefer. If you want to cut back, here are some ideas to try.    Think before you drink.  Do you really want a drink, or is it just a habit? If you're used to having a drink at a certain time, try doing something else then.     Look for substitutes.  Find some no-alcohol drinks that you enjoy, like flavored seltzer water, tea with honey, or tonic with a slice of lime. Or try alcohol-free beer or \"virgin\" cocktails (without the alcohol).     Drink more water.  Use water to quench your thirst. Drink a glass of water before you have any alcohol. Have another glass along with every drink or between drinks.     Shrink your drink.  For example, have a bottle of beer instead of a " "pint. Use a smaller glass for wine. Choose drinks with lower alcohol content (ABV%). Or use less liquor and more mixer in cocktails.     Slow down.  It's easy to drink quickly and without thinking about it. Pay attention, and make each drink last longer.     Do the math.  Total up how much you spend on alcohol each month. How much is that a year? If you cut back, what could you do with the money you save?     Take a break.  Choose a day or two each week when you won't drink at all. Notice how you feel on those days, physically and emotionally. How did you sleep? Do you feel better? Over time, add more break days.     Count calories.  Would you like to lose some weight? For some people that's a good motivator for cutting back. Figure out how many calories are in each drink. How many does that add up to in a day? In a week? In a month?     Practice saying no.  Be ready when someone offers you a drink. Try: \"Thanks, I've had enough.\" Or \"Thanks, but I'm cutting back.\" Or \"No, thanks. I feel better when I drink less.\"   Current as of: November 15, 2023  Content Version: 14.3    2024 [employer].   Care instructions adapted under license by your healthcare professional. If you have questions about a medical condition or this instruction, always ask your healthcare professional. [employer] disclaims any warranty or liability for your use of this information.     "

## 2025-01-14 ENCOUNTER — PATIENT OUTREACH (OUTPATIENT)
Dept: CARE COORDINATION | Facility: CLINIC | Age: 80
End: 2025-01-14
Payer: COMMERCIAL

## 2025-01-14 LAB
ALBUMIN SERPL BCG-MCNC: 4.3 G/DL (ref 3.5–5.2)
ALP SERPL-CCNC: 77 U/L (ref 40–150)
ALT SERPL W P-5'-P-CCNC: 23 U/L (ref 0–70)
ANION GAP SERPL CALCULATED.3IONS-SCNC: 14 MMOL/L (ref 7–15)
AST SERPL W P-5'-P-CCNC: 21 U/L (ref 0–45)
BILIRUB SERPL-MCNC: 0.6 MG/DL
BUN SERPL-MCNC: 39.5 MG/DL (ref 8–23)
CALCIUM SERPL-MCNC: 9.1 MG/DL (ref 8.8–10.4)
CHLORIDE SERPL-SCNC: 106 MMOL/L (ref 98–107)
CHOLEST SERPL-MCNC: 186 MG/DL
CREAT SERPL-MCNC: 1.01 MG/DL (ref 0.67–1.17)
EGFRCR SERPLBLD CKD-EPI 2021: 76 ML/MIN/1.73M2
FASTING STATUS PATIENT QL REPORTED: NO
FASTING STATUS PATIENT QL REPORTED: NO
GLUCOSE SERPL-MCNC: 128 MG/DL (ref 70–99)
HCO3 SERPL-SCNC: 18 MMOL/L (ref 22–29)
HDLC SERPL-MCNC: 47 MG/DL
LDLC SERPL CALC-MCNC: 94 MG/DL
NONHDLC SERPL-MCNC: 139 MG/DL
POTASSIUM SERPL-SCNC: 4.7 MMOL/L (ref 3.4–5.3)
PROT SERPL-MCNC: 6.6 G/DL (ref 6.4–8.3)
PSA SERPL DL<=0.01 NG/ML-MCNC: <0.01 NG/ML (ref 0–6.5)
SODIUM SERPL-SCNC: 138 MMOL/L (ref 135–145)
TRIGL SERPL-MCNC: 226 MG/DL

## 2025-04-22 ENCOUNTER — TRANSFERRED RECORDS (OUTPATIENT)
Dept: HEALTH INFORMATION MANAGEMENT | Facility: CLINIC | Age: 80
End: 2025-04-22
Payer: COMMERCIAL

## 2025-08-21 DIAGNOSIS — E78.5 HYPERLIPIDEMIA LDL GOAL <130: ICD-10-CM

## 2025-08-21 DIAGNOSIS — I10 BENIGN ESSENTIAL HYPERTENSION: ICD-10-CM

## 2025-08-21 RX ORDER — LISINOPRIL 10 MG/1
10 TABLET ORAL DAILY
Qty: 100 TABLET | Refills: 1 | Status: SHIPPED | OUTPATIENT
Start: 2025-08-21

## 2025-08-21 RX ORDER — SIMVASTATIN 40 MG
40 TABLET ORAL AT BEDTIME
Qty: 100 TABLET | Refills: 1 | Status: SHIPPED | OUTPATIENT
Start: 2025-08-21